# Patient Record
Sex: FEMALE | Race: BLACK OR AFRICAN AMERICAN | NOT HISPANIC OR LATINO | Employment: FULL TIME | URBAN - METROPOLITAN AREA
[De-identification: names, ages, dates, MRNs, and addresses within clinical notes are randomized per-mention and may not be internally consistent; named-entity substitution may affect disease eponyms.]

---

## 2017-03-08 ENCOUNTER — OFFICE VISIT (OUTPATIENT)
Dept: FAMILY MEDICINE | Facility: CLINIC | Age: 47
End: 2017-03-08
Payer: COMMERCIAL

## 2017-03-08 VITALS
HEIGHT: 65 IN | WEIGHT: 150.13 LBS | TEMPERATURE: 99 F | OXYGEN SATURATION: 100 % | BODY MASS INDEX: 25.01 KG/M2 | SYSTOLIC BLOOD PRESSURE: 142 MMHG | RESPIRATION RATE: 18 BRPM | HEART RATE: 100 BPM | DIASTOLIC BLOOD PRESSURE: 82 MMHG

## 2017-03-08 DIAGNOSIS — N89.8 VAGINAL DISCHARGE: Primary | ICD-10-CM

## 2017-03-08 PROCEDURE — 1160F RVW MEDS BY RX/DR IN RCRD: CPT | Mod: S$GLB,,, | Performed by: REGISTERED NURSE

## 2017-03-08 PROCEDURE — 87480 CANDIDA DNA DIR PROBE: CPT

## 2017-03-08 PROCEDURE — 99213 OFFICE O/P EST LOW 20 MIN: CPT | Mod: S$GLB,,, | Performed by: REGISTERED NURSE

## 2017-03-08 PROCEDURE — 99999 PR PBB SHADOW E&M-EST. PATIENT-LVL IV: CPT | Mod: PBBFAC,,, | Performed by: REGISTERED NURSE

## 2017-03-08 NOTE — PROGRESS NOTES
"Subjective:       Patient ID: Liana Keane is a 46 y.o. female.    Chief Complaint: Vaginitis    HPI     Liana is here today with c/o vaginal discharge x few days.  Reports clear d/c with mild odor and itching.  Wears panty liners daily, uses Dial antibacterial soap for daily hygiene.  Has not used any OTC meds at this time.    Review of Systems   Constitutional: Negative.    Genitourinary: Positive for vaginal discharge. Negative for dysuria, genital sores, vaginal bleeding and vaginal pain.       Objective:       Vitals:    03/08/17 1330   BP: (!) 142/82   BP Location: Left arm   Patient Position: Sitting   BP Method: Manual   Pulse: 100   Resp: 18   Temp: 99.3 °F (37.4 °C)   TempSrc: Tympanic   SpO2: 100%   Weight: 68.1 kg (150 lb 2.1 oz)   Height: 5' 5" (1.651 m)       Physical Exam   Constitutional: She is oriented to person, place, and time. She appears well-developed and well-nourished.   Genitourinary: There is no rash, tenderness or lesion on the right labia. There is no rash, tenderness or lesion on the left labia. No erythema or tenderness in the vagina. Vaginal discharge (scant amount of whitish-clear d/c, mild odor) found.   Neurological: She is alert and oriented to person, place, and time.       Assessment:       1. Vaginal discharge        Plan:       Liana was seen today for vaginitis.    Diagnoses and all orders for this visit:    Vaginal discharge  -     Vaginosis Screen by DNA Probe      Infection triggers and prevention discussed.  Lab pending.  RTC prn.  "

## 2017-03-08 NOTE — MR AVS SNAPSHOT
"    [facility]  [address]  [place] [address]  Phone: [phone]                  Liana Keane   3/8/2017 1:15 PM   Office Visit    Description:  Female : 1970   Provider:  Carlos Rivera NP   Department:  [facility]           Reason for Visit     Vaginitis           Diagnoses this Visit        Comments    Vaginal discharge    -  Primary            To Do List           Goals (5 Years of Data)     None      [facility]     [facility] Care [facility] -  Assistance  Registered nurses in the [facility] provide clinical advisement, health education, appointment booking, and other advisory services.  Call for this free service at [phone].             Medications           Message regarding Medications     Verify the changes and/or additions to your medication regime listed below are the same as discussed with your clinician today.  If any of these changes or additions are incorrect, please notify your healthcare provider.        STOP taking these medications     fluticasone (FLONASE) 50 mcg/actuation nasal spray 2 sprays by Each Nare route daily as needed.           Verify that the below list of medications is an accurate representation of the medications you are currently taking.  If none reported, the list may be blank. If incorrect, please contact your healthcare provider. Carry this list with you in case of emergency.           Current Medications     albuterol (PROAIR HFA) 90 mcg/actuation inhaler Inhale 2 puffs into the lungs every 6 (six) hours as needed.    drospirenone-ethinyl estradiol (VESTURA, 28,) 3-0.02 mg per tablet Take 1 tablet by mouth once daily.           Clinical Reference Information           Your Vitals Were     BP Pulse Temp Resp Height    142/82 (BP Location: Left arm, Patient Position: Sitting, BP Method: Manual) 100 99.3 °F (37.4 °C) (Tympanic) 18 5' 5" (1.651 m)    Weight Last Period SpO2 " BMI    68.1 kg (150 lb 2.1 oz) 02/18/2017 100% 24.98 kg/m2      Blood Pressure          Most Recent Value    BP  (!)  142/82      Allergies as of 3/8/2017     No Known Allergies      Immunizations Administered on Date of Encounter - 3/8/2017     None      Orders Placed During Today's Visit      Normal Orders This Visit    Vaginosis Screen by DNA Probe       Language Assistance Services     ATTENTION: Language assistance services are available, free of charge. Please call [phone].      ATENCIÓN: Si daniellala daljit, tiene a laboy disposición servicios gratuitos de asistencia lingüística. Llame al [phone].     GABBY Ý: N?u b?n nói Ti?ng Vi?t, có các d?ch v? h? tr? ngôn ng? mi?n phí dành cho b?n. G?i s? [phone].         [facility] complies with applicable Federal civil rights laws and does not discriminate on the basis of race, color, national origin, age, disability, or sex.

## 2017-03-09 LAB
CANDIDA RRNA VAG QL PROBE: NEGATIVE
G VAGINALIS RRNA GENITAL QL PROBE: NEGATIVE
T VAGINALIS RRNA GENITAL QL PROBE: NEGATIVE

## 2017-03-10 ENCOUNTER — TELEPHONE (OUTPATIENT)
Dept: FAMILY MEDICINE | Facility: CLINIC | Age: 47
End: 2017-03-10

## 2017-04-19 ENCOUNTER — OFFICE VISIT (OUTPATIENT)
Dept: FAMILY MEDICINE | Facility: CLINIC | Age: 47
End: 2017-04-19
Payer: COMMERCIAL

## 2017-04-19 VITALS
OXYGEN SATURATION: 100 % | DIASTOLIC BLOOD PRESSURE: 82 MMHG | BODY MASS INDEX: 25.3 KG/M2 | SYSTOLIC BLOOD PRESSURE: 118 MMHG | HEIGHT: 65 IN | HEART RATE: 97 BPM | TEMPERATURE: 99 F | WEIGHT: 151.88 LBS | RESPIRATION RATE: 16 BRPM

## 2017-04-19 DIAGNOSIS — J30.9 ALLERGIC RHINITIS, UNSPECIFIED: Primary | ICD-10-CM

## 2017-04-19 PROCEDURE — 99213 OFFICE O/P EST LOW 20 MIN: CPT | Mod: 25,S$GLB,, | Performed by: REGISTERED NURSE

## 2017-04-19 PROCEDURE — 1160F RVW MEDS BY RX/DR IN RCRD: CPT | Mod: S$GLB,,, | Performed by: REGISTERED NURSE

## 2017-04-19 PROCEDURE — 99999 PR PBB SHADOW E&M-EST. PATIENT-LVL III: CPT | Mod: PBBFAC,,, | Performed by: REGISTERED NURSE

## 2017-04-19 RX ORDER — BETAMETHASONE SODIUM PHOSPHATE AND BETAMETHASONE ACETATE 3; 3 MG/ML; MG/ML
12 INJECTION, SUSPENSION INTRA-ARTICULAR; INTRALESIONAL; INTRAMUSCULAR; SOFT TISSUE
Status: COMPLETED | OUTPATIENT
Start: 2017-04-19 | End: 2017-04-19

## 2017-04-19 RX ADMIN — BETAMETHASONE SODIUM PHOSPHATE AND BETAMETHASONE ACETATE 12 MG: 3; 3 INJECTION, SUSPENSION INTRA-ARTICULAR; INTRALESIONAL; INTRAMUSCULAR; SOFT TISSUE at 02:04

## 2017-04-19 NOTE — PROGRESS NOTES
Subjective:       Patient ID: Liana Keane is a 46 y.o. female.    Chief Complaint: Sinus Problem      HPI    Liana is here today with c/o illness x 5 days.  Reports not worsening, just lingering on w/out resolution.  Reports RN, NC, sneezing, sore throat, aches and watery eyes.  Taking Zyrtec, [place] and using Flonase spray.  History of seasonal allergies.    Review of Systems   Constitutional: Negative for chills and fever.   HENT: Positive for congestion, postnasal drip, rhinorrhea, sneezing and sore throat. Negative for ear pain and sinus pressure.    Eyes: Positive for discharge and itching. Negative for photophobia, pain, redness and visual disturbance.   Respiratory: Negative.    Cardiovascular: Negative.    Allergic/Immunologic: Positive for environmental allergies.   Neurological: Negative.    Hematological: Negative for adenopathy.         Patient Active Problem List   Diagnosis    Seasonal allergic rhinitis due to pollen    Mild intermittent asthma without complication    Anemia         Objective:     Physical Exam   Constitutional: She is oriented to person, place, and time. She appears well-developed and well-nourished.   HENT:   Head: Normocephalic and atraumatic.   Right Ear: Tympanic membrane, external ear and ear canal normal.   Left Ear: Tympanic membrane, external ear and ear canal normal.   Nose: Mucosal edema and rhinorrhea (boggy, clear RN) present. Right sinus exhibits no maxillary sinus tenderness and no frontal sinus tenderness. Left sinus exhibits no maxillary sinus tenderness and no frontal sinus tenderness.   Mouth/Throat: No oropharyngeal exudate, posterior oropharyngeal edema or posterior oropharyngeal erythema (clear PND noted).   Eyes: Pupils are equal, round, and reactive to light. Right eye exhibits discharge (clear B watery d/c, no eye redness noted). Left eye exhibits discharge.   Cardiovascular: Normal rate, regular rhythm and normal heart sounds.    Pulmonary/Chest: Effort  normal and breath sounds normal.   Lymphadenopathy:     She has no cervical adenopathy.   Neurological: She is alert and oriented to person, place, and time.   Vitals reviewed.        Medication List with Changes/Refills   Current Medications    ALBUTEROL (PROAIR HFA) 90 MCG/ACTUATION INHALER    Inhale 2 puffs into the lungs every 6 (six) hours as needed.    DROSPIRENONE-ETHINYL ESTRADIOL (VESTURA, 28,) 3-0.02 MG PER TABLET    Take 1 tablet by mouth once daily.           Diagnosis       1. Allergic rhinitis, unspecified          Assessment/ Plan     Allergic rhinitis, unspecified  -     betamethasone acetate-betamethasone sodium phosphate injection 12 mg; Inject 2 mLs (12 mg total) into the muscle one time.      Allegra or Claritin daily for allergies.  Continue Flonase.  Symptomatic care, rest, fluids, hydration.  Follow-up in clinic as needed.      PARAMJIT Foley  [facility]

## 2017-04-19 NOTE — MR AVS SNAPSHOT
[facility]  [address]  [place] [address]  Phone: [phone]                  Liana Keane   2017 1:45 PM   Office Visit    Description:  Female : 1970   Provider:  Carlos Rivera NP   Department:  [facility]           Reason for Visit     Sinus Problem           Diagnoses this Visit        Comments    Allergic rhinitis, unspecified    -  Primary            To Do List           Goals (5 Years of Data)     None      [facility]     [facility] Nurse Care Line -  Assistance  Unless otherwise directed by your provider, please contact [facility], our nurse care line that is available for  assistance.     Registered nurses in the [facility] provide: appointment scheduling, clinical advisement, health education, and other advisory services.  Call: [phone] (toll free)               Medications           Message regarding Medications     Verify the changes and/or additions to your medication regime listed below are the same as discussed with your clinician today.  If any of these changes or additions are incorrect, please notify your healthcare provider.        These medications were administered today        Dose Freq    betamethasone acetate-betamethasone sodium phosphate injection 12 mg 12 mg Clinic/HOD 1 time    Sig: Inject 2 mLs (12 mg total) into the muscle one time.    Class: Normal    Route: Intramuscular           Verify that the below list of medications is an accurate representation of the medications you are currently taking.  If none reported, the list may be blank. If incorrect, please contact your healthcare provider. Carry this list with you in case of emergency.           Current Medications     albuterol (PROAIR HFA) 90 mcg/actuation inhaler Inhale 2 puffs into the lungs every 6 (six) hours as needed.    drospirenone-ethinyl estradiol (VESTURA, 28,) 3-0.02 mg per tablet Take 1 tablet by  "mouth once daily.           Clinical Reference Information           Your Vitals Were     BP Pulse Temp Resp Height    118/82 (BP Location: Left arm, Patient Position: Sitting, BP Method: Manual) 97 99.2 °F (37.3 °C) (Tympanic) 16 5' 5" (1.651 m)    Weight Last Period SpO2 BMI    68.9 kg (151 lb 14.4 oz) 04/15/2017 100% 25.28 kg/m2      Blood Pressure          Most Recent Value    BP  118/82      Allergies as of 4/19/2017     No Known Allergies      Immunizations Administered on Date of Encounter - 4/19/2017     None      Language Assistance Services     ATTENTION: Language assistance services are available, free of charge. Please call [phone].      ATENCIÓN: Si habla daljit, tiene a laboy disposición servicios gratuitos de asistencia lingüística. Llame al [phone].     GABBY Ý: N?u b?n nói Ti?ng Vi?t, có các d?ch v? h? tr? ngôn ng? mi?n phí dành cho b?n. G?i s? [phone].         [facility] complies with applicable Federal civil rights laws and does not discriminate on the basis of race, color, national origin, age, disability, or sex.        "

## 2017-09-24 RX ORDER — DROSPIRENONE AND ETHINYL ESTRADIOL 0.02-3(28)
KIT ORAL
Qty: 28 TABLET | Refills: 0 | Status: SHIPPED | OUTPATIENT
Start: 2017-09-24 | End: 2017-10-24 | Stop reason: SDUPTHER

## 2017-10-24 ENCOUNTER — OFFICE VISIT (OUTPATIENT)
Dept: FAMILY MEDICINE | Facility: CLINIC | Age: 47
End: 2017-10-24
Payer: COMMERCIAL

## 2017-10-24 VITALS
WEIGHT: 158.75 LBS | SYSTOLIC BLOOD PRESSURE: 120 MMHG | RESPIRATION RATE: 18 BRPM | DIASTOLIC BLOOD PRESSURE: 82 MMHG | HEIGHT: 65 IN | HEART RATE: 97 BPM | OXYGEN SATURATION: 100 % | TEMPERATURE: 99 F | BODY MASS INDEX: 26.45 KG/M2

## 2017-10-24 DIAGNOSIS — N92.0 MENORRHAGIA WITH REGULAR CYCLE: ICD-10-CM

## 2017-10-24 DIAGNOSIS — N94.6 DYSMENORRHEA: ICD-10-CM

## 2017-10-24 DIAGNOSIS — Z00.00 PREVENTATIVE HEALTH CARE: Primary | ICD-10-CM

## 2017-10-24 DIAGNOSIS — H93.8X1 POUNDING NOISE IN RIGHT EAR: ICD-10-CM

## 2017-10-24 DIAGNOSIS — D64.9 ANEMIA, UNSPECIFIED TYPE: ICD-10-CM

## 2017-10-24 PROCEDURE — 99999 PR PBB SHADOW E&M-EST. PATIENT-LVL IV: CPT | Mod: PBBFAC,,, | Performed by: FAMILY MEDICINE

## 2017-10-24 PROCEDURE — 99396 PREV VISIT EST AGE 40-64: CPT | Mod: S$GLB,,, | Performed by: FAMILY MEDICINE

## 2017-10-24 RX ORDER — DROSPIRENONE AND ETHINYL ESTRADIOL 0.02-3(28)
1 KIT ORAL DAILY
Qty: 28 TABLET | Refills: 11 | Status: ON HOLD | OUTPATIENT
Start: 2017-10-24 | End: 2017-12-27 | Stop reason: HOSPADM

## 2017-10-24 NOTE — PROGRESS NOTES
CHIEF COMPLAINT: This is a 47-year-old female here for preventive health exam.    SUBJECTIVE: Patient complains of a nine-month history of severe menstrual cramping and heavy flow despite taking oral contraceptive pill.  She has 7 days of flow, 5 of which are heavy.  Patient complains of rhythmic pounding in right ear for 2 weeks, which is painful.  She reports that hearing comes and goes but had normal audiogram a few years ago.  Patient uses rescue inhaler for mild intermittent asthma    Eye exam August 2017. Pap smear October 2015, due again in October 2018. Mammogram November 2016.  Tdap October 2015.     ROS:  GENERAL: Patient denies fever, chills, night sweats. Patient denies weight loss. Patient denies anorexia, fatigue, weakness or swollen glands.  SKIN: Patient denies rash or hair loss.  HEENT: Patient denies sore throat, ear pain, hearing loss, nasal congestion, or runny nose. Patient denies visual disturbance, eye irritation or discharge.  LUNGS: Patient denies cough, wheeze or hemoptysis.  CARDIOVASCULAR: Patient denies chest pain, shortness of breath, palpitations, syncope or lower extremity edema.  GI: Patient denies abdominal pain, nausea, vomiting, diarrhea, constipation, blood in stool or melena.  GENITOURINARY: Patient denies vaginal discharge, itch or odor.  Patient denies dysuria, frequency, hematuria, nocturia, urgency or incontinence.  BREASTS: Patient denies breast pain, mass or nipple discharge.  MUSCULOSKELETAL: Patient denies joint pain, swelling, redness or warmth.  NEUROLOGIC: Patient denies headache, vertigo, paresthesias, weakness in limb, dysarthria, dysphagia or abnormality of gait.  PSYCHIATRIC: Patient denies anxiety, depression, or memory loss.     OBJECTIVE:   GENERAL: Well-developed well-nourished, slightly overweight black female alert and oriented x3, in no acute distress. Memory, judgment and cognition without deficit. Weight gain of 11 pounds in the last year.  SKIN: Clear  without rash. Normal color and tone.  HEENT: Eyes: Clear conjunctivae. Pupils equal reactive to light and accommodation. Ears: Clear TMs. Clear canals. Nose: Without congestion. Pharynx: Without injection or exudates.  NECK: Supple, normal range of motion. No masses, lymphadenopathy or enlarged thyroid. No JVD. Carotids 2+ and equal. No bruits.  LUNGS: Clear to auscultation. Normal respiratory effort.  CARDIOVASCULAR: Regular rhythm, normal S1, S2 without murmur, gallop or rub.  BACK: No CVA or spinal tenderness.  BREASTS: No masses, tenderness or nipple discharge.  ABDOMEN: Normal appearance. Active bowel sounds. Soft, nontender without mass or organomegaly. No rebound or guarding.  EXTREMITIES: Without cyanosis, clubbing or edema. Distal pulses 2+ and equal. Normal range of motion in all extremities. No joint effusion, erythema or warmth.  NEUROLOGIC: Cranial nerves II through XII without deficit. Motor strength equal bilaterally. Sensation normal to touch. Deep tendon reflexes 2+ and equal. Gait without abnormality. No tremor. Negative cerebellar signs.  PELVIC: Deferred to GYN.    ASSESSMENT:  1. Preventative health care    2. Anemia, unspecified type    3. Dysmenorrhea    4. Menorrhagia with regular cycle    5. Pounding noise in right ear      PLAN:   1.  Weight reduction.  Exercise regularly.  2.  Age-appropriate counseling.  3.  Fasting lab.  4.  GYN consult.  5.  ENT consult.  6.  Refill OCP.

## 2017-10-25 ENCOUNTER — TELEPHONE (OUTPATIENT)
Dept: FAMILY MEDICINE | Facility: CLINIC | Age: 47
End: 2017-10-25

## 2017-10-25 DIAGNOSIS — Z12.31 ENCOUNTER FOR SCREENING MAMMOGRAM FOR MALIGNANT NEOPLASM OF BREAST: Primary | ICD-10-CM

## 2017-10-25 NOTE — TELEPHONE ENCOUNTER
----- Message from Denise Horvath sent at 10/25/2017  9:53 AM CDT -----  Contact: pt  Please call pt @ [phone] regarding an order for a mammograph, pt was in clinic yesterday.

## 2017-10-27 ENCOUNTER — OFFICE VISIT (OUTPATIENT)
Dept: OTOLARYNGOLOGY | Facility: CLINIC | Age: 47
End: 2017-10-27
Payer: COMMERCIAL

## 2017-10-27 ENCOUNTER — CLINICAL SUPPORT (OUTPATIENT)
Dept: AUDIOLOGY | Facility: CLINIC | Age: 47
End: 2017-10-27
Payer: COMMERCIAL

## 2017-10-27 ENCOUNTER — LAB VISIT (OUTPATIENT)
Dept: LAB | Facility: HOSPITAL | Age: 47
End: 2017-10-27
Attending: FAMILY MEDICINE
Payer: COMMERCIAL

## 2017-10-27 ENCOUNTER — OFFICE VISIT (OUTPATIENT)
Dept: OBSTETRICS AND GYNECOLOGY | Facility: CLINIC | Age: 47
End: 2017-10-27
Payer: COMMERCIAL

## 2017-10-27 VITALS — BODY MASS INDEX: 25.96 KG/M2 | WEIGHT: 156 LBS

## 2017-10-27 VITALS
HEIGHT: 65 IN | SYSTOLIC BLOOD PRESSURE: 108 MMHG | WEIGHT: 156.06 LBS | DIASTOLIC BLOOD PRESSURE: 70 MMHG | BODY MASS INDEX: 26 KG/M2

## 2017-10-27 DIAGNOSIS — N92.0 MENORRHAGIA WITH REGULAR CYCLE: ICD-10-CM

## 2017-10-27 DIAGNOSIS — N94.6 DYSMENORRHEA: ICD-10-CM

## 2017-10-27 DIAGNOSIS — Z00.00 PREVENTATIVE HEALTH CARE: ICD-10-CM

## 2017-10-27 DIAGNOSIS — Z01.419 ENCOUNTER FOR GYNECOLOGICAL EXAMINATION WITHOUT ABNORMAL FINDING: Primary | ICD-10-CM

## 2017-10-27 DIAGNOSIS — H93.11 TINNITUS OF RIGHT EAR: Primary | ICD-10-CM

## 2017-10-27 DIAGNOSIS — H93.11 SUBJECTIVE TINNITUS OF RIGHT EAR: Primary | ICD-10-CM

## 2017-10-27 LAB
ALBUMIN SERPL BCP-MCNC: 3.2 G/DL
ALP SERPL-CCNC: 33 U/L
ALT SERPL W/O P-5'-P-CCNC: 11 U/L
ANION GAP SERPL CALC-SCNC: 10 MMOL/L
AST SERPL-CCNC: 20 U/L
BASOPHILS # BLD AUTO: 0.03 K/UL
BASOPHILS NFR BLD: 1 %
BILIRUB SERPL-MCNC: 0.3 MG/DL
BUN SERPL-MCNC: 8 MG/DL
CALCIUM SERPL-MCNC: 9.1 MG/DL
CHLORIDE SERPL-SCNC: 107 MMOL/L
CHOLEST SERPL-MCNC: 165 MG/DL
CHOLEST/HDLC SERPL: 2 {RATIO}
CO2 SERPL-SCNC: 21 MMOL/L
CREAT SERPL-MCNC: 1 MG/DL
DIFFERENTIAL METHOD: ABNORMAL
EOSINOPHIL # BLD AUTO: 0.1 K/UL
EOSINOPHIL NFR BLD: 2.9 %
ERYTHROCYTE [DISTWIDTH] IN BLOOD BY AUTOMATED COUNT: 12.4 %
EST. GFR  (AFRICAN AMERICAN): >60 ML/MIN/1.73 M^2
EST. GFR  (NON AFRICAN AMERICAN): >60 ML/MIN/1.73 M^2
GLUCOSE SERPL-MCNC: 94 MG/DL
HCT VFR BLD AUTO: 35.7 %
HDLC SERPL-MCNC: 81 MG/DL
HDLC SERPL: 49.1 %
HGB BLD-MCNC: 11.8 G/DL
IMM GRANULOCYTES # BLD AUTO: 0 K/UL
IMM GRANULOCYTES NFR BLD AUTO: 0 %
LDLC SERPL CALC-MCNC: 68.6 MG/DL
LYMPHOCYTES # BLD AUTO: 1.6 K/UL
LYMPHOCYTES NFR BLD: 51.4 %
MCH RBC QN AUTO: 30.4 PG
MCHC RBC AUTO-ENTMCNC: 33.1 G/DL
MCV RBC AUTO: 92 FL
MONOCYTES # BLD AUTO: 0.3 K/UL
MONOCYTES NFR BLD: 8.7 %
NEUTROPHILS # BLD AUTO: 1.1 K/UL
NEUTROPHILS NFR BLD: 36 %
NONHDLC SERPL-MCNC: 84 MG/DL
NRBC BLD-RTO: 0 /100 WBC
PLATELET # BLD AUTO: 287 K/UL
PMV BLD AUTO: 10.9 FL
POTASSIUM SERPL-SCNC: 4.2 MMOL/L
PROT SERPL-MCNC: 7.2 G/DL
RBC # BLD AUTO: 3.88 M/UL
SODIUM SERPL-SCNC: 138 MMOL/L
TRIGL SERPL-MCNC: 77 MG/DL
TSH SERPL DL<=0.005 MIU/L-ACNC: 0.99 UIU/ML
WBC # BLD AUTO: 3.11 K/UL

## 2017-10-27 PROCEDURE — 92567 TYMPANOMETRY: CPT | Mod: S$GLB,,, | Performed by: AUDIOLOGIST

## 2017-10-27 PROCEDURE — 80061 LIPID PANEL: CPT

## 2017-10-27 PROCEDURE — 36415 COLL VENOUS BLD VENIPUNCTURE: CPT | Mod: PO

## 2017-10-27 PROCEDURE — 80053 COMPREHEN METABOLIC PANEL: CPT

## 2017-10-27 PROCEDURE — 99386 PREV VISIT NEW AGE 40-64: CPT | Mod: 25,S$GLB,, | Performed by: NURSE PRACTITIONER

## 2017-10-27 PROCEDURE — 99999 PR PBB SHADOW E&M-EST. PATIENT-LVL II: CPT | Mod: PBBFAC,,, | Performed by: OTOLARYNGOLOGY

## 2017-10-27 PROCEDURE — 92557 COMPREHENSIVE HEARING TEST: CPT | Mod: S$GLB,,, | Performed by: AUDIOLOGIST

## 2017-10-27 PROCEDURE — 84443 ASSAY THYROID STIM HORMONE: CPT

## 2017-10-27 PROCEDURE — 99999 PR PBB SHADOW E&M-EST. PATIENT-LVL III: CPT | Mod: PBBFAC,,, | Performed by: NURSE PRACTITIONER

## 2017-10-27 PROCEDURE — 99214 OFFICE O/P EST MOD 30 MIN: CPT | Mod: S$GLB,,, | Performed by: OTOLARYNGOLOGY

## 2017-10-27 PROCEDURE — 85025 COMPLETE CBC W/AUTO DIFF WBC: CPT

## 2017-10-27 NOTE — LETTER
October 27, 2017      Rhoda Charles MD  [address] [place] [address]           [facility] - [facility]NT  [address] [place] [address]  Phone: [phone]  Fax: [phone]          Patient: Liana Keane   MR Number: [id number]   YOB: 1970   Date of Visit: 10/27/2017       Dear Dr. Rhoda Charles:    Thank you for referring Liana Keane to me for evaluation. Attached you will find relevant portions of my assessment and plan of care.    If you have questions, please do not hesitate to call me. I look forward to following Liana Keane along with you.    Sincerely,    Rush Morales MD    Enclosure  CC:  No Recipients    If you would like to receive this communication electronically, please contact [email or web address] or [phone] to request more information on [employer] Link access.    For providers and/or their staff who would like to refer a patient to [facility], please contact us through our one-stop-shop provider referral line, [facility] , at [phone].    If you feel you have received this communication in error or would no longer like to receive these types of communications, please e-mail [email or web address]

## 2017-10-27 NOTE — PROGRESS NOTES
Subjective:   Patient: Liana Keane [id number], :1970   Visit date:10/27/2017 11:57 AM    Chief Complaint:  Tinnitus (Right ear review audio today ) and Otalgia (Right ear )    HPI:  Liana is a 47 y.o. female who is here for     Pulsatile/pounding tinnitus Right ear  2 weeks intermittent  Total of 4-5 episodes  Last episode about 6 days ago  They last until she goes to bed  No change in hearing  Associated with pressure/pain which also comes and goes  Has had increased stress over the past 2 weeks.    Audiogram reviewed- normal hearing, Type A tymps      Review of Systems:  -     Allergic/Immunologic: has No Known Allergies..  -     Constitutional: Current temp:      Her meds, allergies, medical, surgical, social & family histories were reviewed & updated:  -     She has a current medication list which includes the following prescription(s): albuterol and drospirenone-ethinyl estradiol.  -     She  has a past medical history of Acne; Allergic rhinitis; Anemia; Asthma; Herpes simplex type II infection; and Multinodular goiter.   -     She  does not have any pertinent problems on file.   -     She  has a past surgical history that includes Hernia repair; FNA thyroid nodule (2014); and Thyroidectomy, partial (2015).  -     She  reports that she has never smoked. She has never used smokeless tobacco. She reports that she drinks alcohol. She reports that she does not use drugs.  -     Her family history includes Allergic rhinitis in her sister; Asthma in her sister; Cancer in her other; Diabetes in her maternal grandfather; Hypertension in her maternal grandmother and mother; Thyroid disease in her mother.  -     She has No Known Allergies.    Objective:     Physical Exam:  Vitals:  Wt 70.8 kg (156 lb)   LMP 10/26/2017 (Exact Date)   BMI 25.96 kg/m²   Appearance:  Well-developed, well-nourished.  Communication:  Able to communicate, no hoarseness.  Head & Face:  Normocephalic, atraumatic, no sinus  tenderness, normal facial strength.  Eyes:  Extraocular motions intact.  Ears:  Otoscopy of external auditory canals and tympanic membranes was normal, clinical speech reception thresholds grossly intact, no mass/lesion of auricle.  Nose:  No masses/lesions of external nose, nasal mucosa, septum, and turbinates were within normal limits.  Mouth:  No mass/lesion of lips, teeth, gums, hard/soft palate, tongue, tonsils, or oropharynx.  Neck & Lymphatics:  No cervical lymphadenopathy, no neck mass/crepitus/ asymmetry, trachea is midline, no thyroid enlargement/tenderness/mass.  Neuro/Psych: Alert with normal mood and affect.   Abdominal: Normal appearance.   Respiration/Chest:  Symmetric expansion during respiration, normal respiratory effort.  Skin:  Warm and intact  Cardiovascular:  No peripheral vascular edema or varicosities.    Assessment & Plan:   Recommend stress reduction.  We discussed that if this becomes more frequent, CTA would be in order but given intermittent nature, vascular abnormality is unlikely.  Discussed need to check BP during sx if they recur as well.  She will contact me if symptoms persist/recur.

## 2017-10-27 NOTE — PROGRESS NOTES
Liana Keane was seen 10/27/2017 for an audiological evaluation.  Patient complains of a thumping sound with shooting pain in her right ear only.  Onset was 2 weeks ago. Last episode was last Sunday and it lasted for several hours. She reports that her hearing seems to decrease mildly on the right side while she has the sound and pain. She denies any dizziness.     Results reveal normal hearing sensitivity 250-8000 Hz bilaterally.  Speech Reception Thresholds were  10 dBHL for the right ear and 10 dBHL for the left ear.   Word recognition scores were excellent bilaterally.  Tympanograms were Type A, normal for the right ear and Type A, normal for the left ear.    Patient was counseled on the above findings.    Recommendations include:    1.  ENT followup  2.  Wear hearing protective devices around loud noise

## 2017-10-27 NOTE — LETTER
October 27, 2017      Rhoda Charles MD  [address]  [place] [address]           [facility] - OB/ GYN  [address]  [place] [address]  Phone: [phone]  Fax: [phone]          Patient: Liana Keane   MR Number: [id number]   YOB: 1970   Date of Visit: 10/27/2017       Dear Dr. Rhoda Charles:    Thank you for referring Liana Keane to me for evaluation. Attached you will find relevant portions of my assessment and plan of care.    If you have questions, please do not hesitate to call me. I look forward to following Liana Keane along with you.    Sincerely,    Gifty Alegria, BERNARD    Enclosure  CC:  No Recipients    If you would like to receive this communication electronically, please contact [email or web address] or [phone] to request more information on [employer] Link access.    For providers and/or their staff who would like to refer a patient to [facility], please contact us through our one-stop-shop provider referral line, [facility] , at [phone].    If you feel you have received this communication in error or would no longer like to receive these types of communications, please e-mail [email or web address]

## 2017-10-27 NOTE — PROGRESS NOTES
"CC: Well woman exam    Liana Keane is a 47 y.o. female  presents for well woman exam and issues.  States earlier this year started with severe pelvic pain first 1-2 days of cycle - so bad is missing work.  Also noted it has gotten very heavy with large clotting.  Has been on kevin for years and started on it due to irregular menses and pain - has worked up until this year.  Had some bleeding in  that regulated out with having thyroid surgery.  Her labs are on order now from her pcp.   Pap is up to date in  with Dr. Charles.    LMP: Patient's last menstrual period was 10/26/2017 (exact date)..     Past Medical History:   Diagnosis Date    Acne     Allergic rhinitis     Anemia     Asthma     Herpes simplex type II infection     Multinodular goiter      Past Surgical History:   Procedure Laterality Date    FNA thyroid nodule  2014    HERNIA REPAIR      THYROIDECTOMY, PARTIAL  2015     Social History     Social History    Marital status:      Spouse name: N/A    Number of children: N/A    Years of education: N/A     Occupational History    [employer]     Social History Main Topics    Smoking status: Never Smoker    Smokeless tobacco: Never Used    Alcohol use Yes      Comment: Occasionally    Drug use: No    Sexual activity: Not Currently     Birth control/ protection: OCP     Other Topics Concern    Not on file     Social History Narrative    Patient is  and has 2 children. She works as a .     Family History   Problem Relation Age of Onset    Hypertension Mother     Thyroid disease Mother     Hypertension Maternal Grandmother     Cancer Other      Maternal great-grandmother    Diabetes Maternal Grandfather     Asthma Sister     Allergic rhinitis Sister      OB History      Para Term  AB Living    2 2       2    SAB TAB Ectopic Multiple Live Births                       /70   Ht 5' 5" (1.651 m)   Wt 70.8 kg " (156 lb 1.4 oz)   LMP 10/26/2017 (Exact Date)   BMI 25.97 kg/m²       ROS:  GENERAL: Denies weight gain or weight loss. Feeling well overall.   SKIN: Denies rash or lesions.   HEAD: Denies head injury or headache.   NODES: Denies enlarged lymph nodes.   CHEST: Denies chest pain or shortness of breath.   CARDIOVASCULAR: Denies palpitations or left sided chest pain.   ABDOMEN: No abdominal pain, constipation, diarrhea, nausea, vomiting or rectal bleeding.   URINARY: No frequency, dysuria, hematuria, or burning on urination.  REPRODUCTIVE: See HPI.   BREASTS: The patient performs breast self-examination and denies pain, lumps, or nipple discharge.   HEMATOLOGIC: No easy bruisability or excessive bleeding.   MUSCULOSKELETAL: Denies joint pain or swelling.   NEUROLOGIC: Denies syncope or weakness.   PSYCHIATRIC: Denies depression, anxiety or mood swings.    PHYSICAL EXAM:  APPEARANCE: Well nourished, well developed, in no acute distress.  AFFECT: WNL, alert and oriented x 3  SKIN: No acne or hirsutism  NECK: Neck symmetric without masses or thyromegaly  NODES: No inguinal, cervical, axillary, or femoral lymph node enlargement  CHEST: Good respiratory effect  ABDOMEN: Soft.  No tenderness or masses.  No hepatosplenomegaly.  No hernias.  BREASTS: Symmetrical, no skin changes or visible lesions.  No palpable masses, nipple discharge bilaterally.  PELVIC: Normal external genitalia without lesions.  Normal hair distribution.  Adequate perineal body, normal urethral meatus.  Vagina moist and well rugated without lesions or discharge - thick dark bloody menses.  Cervix pink, without lesions, discharge or tenderness.  No significant cystocele or rectocele.  Bimanual exam shows uterus to be normal size, regular, mobile and nontender - normal exam.  Adnexa without masses or tenderness.    EXTREMITIES: No edema.  Physical Exam    1. Encounter for gynecological examination without abnormal finding     2. Dysmenorrhea  US Pelvis  Comp with Transvag NON-OB (xpd   3. Menorrhagia with regular cycle  US Pelvis Comp with Transvag NON-OB (xpd    AND PLAN:    Patient was counseled today on A.C.S. Pap guidelines and recommendations for yearly pelvic exams, mammograms and monthly self breast exams; to see her PCP for other health maintenance.       Continue current meds, pelvic us and see Gyn MD for further consult after u/s

## 2017-10-31 ENCOUNTER — TELEPHONE (OUTPATIENT)
Dept: RADIOLOGY | Facility: HOSPITAL | Age: 47
End: 2017-10-31

## 2017-11-01 ENCOUNTER — HOSPITAL ENCOUNTER (OUTPATIENT)
Dept: RADIOLOGY | Facility: HOSPITAL | Age: 47
Discharge: HOME | End: 2017-11-01
Attending: NURSE PRACTITIONER
Payer: COMMERCIAL

## 2017-11-01 DIAGNOSIS — N92.0 MENORRHAGIA WITH REGULAR CYCLE: ICD-10-CM

## 2017-11-01 DIAGNOSIS — N94.6 DYSMENORRHEA: ICD-10-CM

## 2017-11-01 PROCEDURE — 76856 US EXAM PELVIC COMPLETE: CPT | Mod: 26,,, | Performed by: RADIOLOGY

## 2017-11-01 PROCEDURE — 76830 TRANSVAGINAL US NON-OB: CPT | Mod: 26,,, | Performed by: RADIOLOGY

## 2017-11-01 PROCEDURE — 76856 US EXAM PELVIC COMPLETE: CPT | Mod: TC,PO

## 2017-11-02 ENCOUNTER — TELEPHONE (OUTPATIENT)
Dept: OBSTETRICS AND GYNECOLOGY | Facility: CLINIC | Age: 47
End: 2017-11-02

## 2017-11-02 NOTE — TELEPHONE ENCOUNTER
Pt notified that pelvic sono shows fibroids and thick lining and to keep f/u apt with stiles on 11.9.17 for 9:15AM/CHAS

## 2017-11-02 NOTE — TELEPHONE ENCOUNTER
Us shows fibroids and very thickened lining of uterus keep her f/u with Dr. Francisco as scheduled

## 2017-11-02 NOTE — TELEPHONE ENCOUNTER
----- Message from Elizabeth Lawrence sent at 11/2/2017  1:37 PM CDT -----  Contact: pt  She's returning a missed call, please advise [phone](work) [phone](cell)

## 2017-11-09 ENCOUNTER — OFFICE VISIT (OUTPATIENT)
Dept: OBSTETRICS AND GYNECOLOGY | Facility: CLINIC | Age: 47
End: 2017-11-09
Payer: COMMERCIAL

## 2017-11-09 ENCOUNTER — HOSPITAL ENCOUNTER (OUTPATIENT)
Dept: RADIOLOGY | Facility: HOSPITAL | Age: 47
Discharge: HOME | End: 2017-11-09
Attending: FAMILY MEDICINE
Payer: COMMERCIAL

## 2017-11-09 VITALS
HEIGHT: 65 IN | BODY MASS INDEX: 26.23 KG/M2 | WEIGHT: 157.44 LBS | SYSTOLIC BLOOD PRESSURE: 128 MMHG | DIASTOLIC BLOOD PRESSURE: 72 MMHG

## 2017-11-09 DIAGNOSIS — Z12.31 ENCOUNTER FOR SCREENING MAMMOGRAM FOR MALIGNANT NEOPLASM OF BREAST: ICD-10-CM

## 2017-11-09 DIAGNOSIS — N92.0 MENORRHAGIA WITH REGULAR CYCLE: Primary | ICD-10-CM

## 2017-11-09 PROCEDURE — 77067 SCR MAMMO BI INCL CAD: CPT | Mod: TC

## 2017-11-09 PROCEDURE — 99999 PR PBB SHADOW E&M-EST. PATIENT-LVL III: CPT | Mod: PBBFAC,,, | Performed by: OBSTETRICS & GYNECOLOGY

## 2017-11-09 PROCEDURE — 99213 OFFICE O/P EST LOW 20 MIN: CPT | Mod: S$GLB,,, | Performed by: OBSTETRICS & GYNECOLOGY

## 2017-11-09 PROCEDURE — 77067 SCR MAMMO BI INCL CAD: CPT | Mod: 26,,, | Performed by: RADIOLOGY

## 2017-11-09 PROCEDURE — 77063 BREAST TOMOSYNTHESIS BI: CPT | Mod: 26,,, | Performed by: RADIOLOGY

## 2017-11-09 NOTE — PROGRESS NOTES
Subjective:       Patient ID: Liana Keane is a 47 y.o. female.    Chief Complaint:  Follow-up      History of Present Illness  HPI  Dysfunctional Uterine Bleeding  Patient complains of irregular menses. She had been bleeding regularly. She is now bleeding every 28 days and menses are lasting 7 days. Bleeding has become heavy despite using OCP.   Dysmenorrhea:severe, occurring throughout menses and have become very disruptive (pt has to regularly miss work with each menses). Cyclic symptoms include: none. Current contraception: OCP (estrogen/progesterone). History of infertility: none. History of abnormal Pap smear: no.  Last pap NILM in .          GYN & OB HistoryPatient's last menstrual period was 10/26/2017 (exact date).   Date of Last Pap: 2015    OB History    Para Term  AB Living   2 2       2   SAB TAB Ectopic Multiple Live Births                  # Outcome Date GA Lbr Shahram/2nd Weight Sex Delivery Anes PTL Lv   2 Para      Vag-Spont      1 Para      Vag-Spont             Review of Systems  Review of Systems   Constitutional: Negative for activity change, appetite change, fatigue, fever and unexpected weight change.   Respiratory: Negative for shortness of breath.    Cardiovascular: Negative for chest pain, palpitations and leg swelling.   Gastrointestinal: Negative for abdominal pain, constipation, diarrhea, nausea and vomiting.   Genitourinary: Positive for menorrhagia, menstrual problem and dysmenorrhea. Negative for dyspareunia, dysuria, flank pain, frequency, genital sores, hematuria, pelvic pain, vaginal bleeding, vaginal discharge, vaginal pain and vaginal odor.   Musculoskeletal: Negative for back pain.   Neurological: Negative for syncope and headaches.           Objective:    Physical Exam:   Constitutional: She is oriented to person, place, and time. She appears well-developed and well-nourished. No distress.                           Neurological: She is alert and oriented to  person, place, and time.     Psychiatric: She has a normal mood and affect. Her behavior is normal. Thought content normal.          US pelvis: The uterus is normal in size and position measuring 9.7 x 4.0 x 5.9 cm. There are two fibroids identified in the posterior fundus measuring 2.0 cm and 1.1 cm respectively.  Additional 2.6 cm subendometrial fibroid in the anterior fundus versus possible endometrial polyp.  There is heterogeneous thickening of the endometrial stripe measuring 1.6 centers.  Small amount of endometrial fluid also noted.  Incidental nabothian cyst.  The right ovary is unremarkable measuring 1.6 x 1.4 x 1.3 cm.  Left ovary is not visualized.  Small amount of fluid present in the posterior cul-de-sac.     Assessment:        1. Menorrhagia with regular cycle             Plan:      Menorrhagia with regular cycle  -     -     Pt was counseled on fibroids, including common signs and symptoms.  Symptoms are highly disruptive and have led to anemia.  OCP used to work, but symptoms have worsened despite OCP.  Pt is done with her fertility and has no desire for future childbearing (pt is  and is not in a relationship).  Treatment options were reviewed with pt, including medical vs ablation vs hysterectomy.  Pt voiced understanding and desires to proceed with endometrial ablation.  Pt is aware that birth control is still recommended after ablation due to risk of pregnancy and concomitant sterilization procedure was offered.  Pt is also aware that dysmenorrhea symptoms may continue after ablation.  Pt voiced understanding and desires to proceed with ablation alone.  Surgery details, indications, risks, and benefits were reviewed with pt.  Will have Luana contact pt to schedule HSC with Novasure Ablation.  Pt will need EMB prior to surgery.      Return in about 1 week (around 11/16/2017) for EMB.

## 2017-11-10 ENCOUNTER — TELEPHONE (OUTPATIENT)
Dept: OBSTETRICS AND GYNECOLOGY | Facility: CLINIC | Age: 47
End: 2017-11-10

## 2017-11-10 DIAGNOSIS — N92.0 MENORRHAGIA WITH REGULAR CYCLE: Primary | ICD-10-CM

## 2017-11-13 ENCOUNTER — PROCEDURE VISIT (OUTPATIENT)
Dept: OBSTETRICS AND GYNECOLOGY | Facility: CLINIC | Age: 47
End: 2017-11-13
Payer: COMMERCIAL

## 2017-11-13 VITALS
WEIGHT: 157.88 LBS | SYSTOLIC BLOOD PRESSURE: 142 MMHG | BODY MASS INDEX: 26.3 KG/M2 | RESPIRATION RATE: 16 BRPM | HEIGHT: 65 IN | DIASTOLIC BLOOD PRESSURE: 78 MMHG

## 2017-11-13 DIAGNOSIS — N92.0 MENORRHAGIA WITH REGULAR CYCLE: Primary | ICD-10-CM

## 2017-11-13 PROCEDURE — 58100 BIOPSY OF UTERUS LINING: CPT | Mod: S$GLB,,, | Performed by: OBSTETRICS & GYNECOLOGY

## 2017-11-13 PROCEDURE — 88305 TISSUE EXAM BY PATHOLOGIST: CPT | Mod: 26,,, | Performed by: PATHOLOGY

## 2017-11-13 PROCEDURE — 81025 URINE PREGNANCY TEST: CPT | Mod: S$GLB,,, | Performed by: OBSTETRICS & GYNECOLOGY

## 2017-11-13 PROCEDURE — 88305 TISSUE EXAM BY PATHOLOGIST: CPT | Performed by: PATHOLOGY

## 2017-11-13 NOTE — PROCEDURES
Endometrial Biopsy- Today  Date/Time: 11/13/2017 3:13 PM  Performed by: BUSHRA LUCIANO.  Authorized by: BUSHRA LUCIANO   Preparation: Patient was prepped and draped in the usual sterile fashion.  Local anesthesia used: no    Anesthesia:  Local anesthesia used: no    Sedation:  Patient sedated: no  Patient tolerance: Patient tolerated the procedure well with no immediate complications  Comments: Patient is here for endometrial biopsy due to menorrhagia.    UPT today: Negative    PRE ENDOMETRIAL BIOPSY COUNSELING:  The patient was informed of the risk of bleeding, infection, uterine perforation and pain and that the test will rule-out endometrial cancer with accuracy greater than 95%. She was counseled on the alternatives to endometrial biopsy and agrees to proceed.    TIME OUT PERFORMED.  The cervix was visualized with a speculum.  No additional instrumentation was required.  A sterile endometrial pipelle was passed without difficulty to a depth of 8 cm.  Scant amount of tissue was obtained on two passes.  Adequate hemostasis.  Pt tolerated well.  EBL 1 mL.    The specimen was placed in formalyn and sent to Pathology for histology evaluation.    POST ENDOMETRIAL BIOPSY COUNSELING:  Manage post biopsy cramping with NSAIDs or Tylenol.  Expect spotting or light bleeding for a few days.  Report bleeding heavier than a period, fever > 101.0 F, worsening pain or a foul smelling vaginal discharge.    Counseling lasted approximately 15 minutes and all her questions were answered.    FOLLOW-UP: Pending biopsy results.  If benign, OK to proceed with surgery as scheduled.

## 2017-11-21 ENCOUNTER — OFFICE VISIT (OUTPATIENT)
Dept: OBSTETRICS AND GYNECOLOGY | Facility: CLINIC | Age: 47
End: 2017-11-21
Payer: COMMERCIAL

## 2017-11-21 ENCOUNTER — HOSPITAL ENCOUNTER (OUTPATIENT)
Dept: PREADMISSION TESTING | Facility: HOSPITAL | Age: 47
Discharge: HOME | End: 2017-11-21
Attending: OBSTETRICS & GYNECOLOGY
Payer: COMMERCIAL

## 2017-11-21 VITALS
SYSTOLIC BLOOD PRESSURE: 108 MMHG | WEIGHT: 161.38 LBS | BODY MASS INDEX: 26.89 KG/M2 | HEIGHT: 65 IN | DIASTOLIC BLOOD PRESSURE: 80 MMHG

## 2017-11-21 VITALS — BODY MASS INDEX: 26.66 KG/M2 | WEIGHT: 160 LBS | HEIGHT: 65 IN

## 2017-11-21 DIAGNOSIS — N92.0 MENORRHAGIA WITH REGULAR CYCLE: Primary | ICD-10-CM

## 2017-11-21 PROCEDURE — 99999 PR PBB SHADOW E&M-EST. PATIENT-LVL II: CPT | Mod: PBBFAC,,, | Performed by: OBSTETRICS & GYNECOLOGY

## 2017-11-21 PROCEDURE — 99499 UNLISTED E&M SERVICE: CPT | Mod: S$GLB,,, | Performed by: OBSTETRICS & GYNECOLOGY

## 2017-11-21 NOTE — PROGRESS NOTES
Pt is here for pre-operative visit.  Pt reports no complaints.  Ready for surgery.    ROS Negative     Physical Exam:  See H+P    A/P:  Menorrhagia with regular cycle  -     Procedure risks, benefits, and alternatives were discussed.  Pt was again reminded of need for continued contraception after ablation and on risks of pregnancy if it were to occur.  Pt voiced understanding and expressed consent for HSC with Novasure Ablation.  Hospital forms were reviewed with pt and signed.  Pre-operative instructions were given.

## 2017-11-21 NOTE — H&P
[facility]  Obstetrics & Gynecology  History & Physical    Patient Name: Liana Keane  MRN: [id number]  Admission Date: (Not on file)  Primary Care Provider: Rhoda Charles MD    Subjective:     Chief Complaint/Reason for Admission: surgery    History of Present Illness:   48 yo  with heavy menses who is here for scheduled ablation surgery.  Pt reports no new complaints and is ready for surgery.    Current Outpatient Prescriptions on File Prior to Visit   Medication Sig    albuterol (PROAIR HFA) 90 mcg/actuation inhaler Inhale 2 puffs into the lungs every 6 (six) hours as needed.    drospirenone-ethinyl estradiol (VESTURA, 28,) 3-0.02 mg per tablet Take 1 tablet by mouth once daily.     No current facility-administered medications on file prior to visit.        Review of patient's allergies indicates:  No Known Allergies    Past Medical History:   Diagnosis Date    Acne     Allergic rhinitis     Anemia     Asthma     Herpes simplex type II infection     Multinodular goiter      OB History    Para Term  AB Living   4 4 2     2   SAB TAB Ectopic Multiple Live Births                  # Outcome Date GA Lbr Shahram/2nd Weight Sex Delivery Anes PTL Lv   4 Term            3 Term            2 Para      Vag-Spont      1 Para      Vag-Spont           Past Surgical History:   Procedure Laterality Date    FNA thyroid nodule  2014    HERNIA REPAIR      THYROIDECTOMY, PARTIAL  2015     Family History     Problem Relation (Age of Onset)    Allergic rhinitis Sister    Asthma Sister    Cancer Other    Diabetes Maternal Grandfather    Hypertension Mother, Maternal Grandmother    Thyroid disease Mother        Social History Main Topics    Smoking status: Never Smoker    Smokeless tobacco: Never Used    Alcohol use Yes      Comment: Occasionally    Drug use: No    Sexual activity: Not Currently     Birth control/ protection: OCP     Review of Systems   Constitutional: Negative for  activity change, appetite change, fatigue, fever and unexpected weight change.   Respiratory: Negative for shortness of breath.    Cardiovascular: Negative for chest pain, palpitations and leg swelling.   Gastrointestinal: Negative for abdominal pain, constipation, diarrhea, nausea and vomiting.   Genitourinary: Positive for menorrhagia, menstrual problem and dysmenorrhea. Negative for dyspareunia, dysuria, flank pain, frequency, genital sores, hematuria, pelvic pain, vaginal bleeding, vaginal discharge, vaginal pain, urinary incontinence and vaginal odor.   Musculoskeletal: Negative for back pain.   Neurological: Negative for syncope and headaches.     Objective:     Vital Signs (Most Recent):    Vital Signs (24h Range):  [unfilled]        There is no height or weight on file to calculate BMI.  Patient's last menstrual period was 10/26/2017.    Physical Exam:   Constitutional: She is oriented to person, place, and time. She appears well-developed and well-nourished. No distress.    HENT:   Head: Normocephalic and atraumatic.    Eyes: EOM are normal. Pupils are equal, round, and reactive to light.    Neck: Normal range of motion. Neck supple.    Cardiovascular: Normal rate, regular rhythm and normal heart sounds.     Pulmonary/Chest: Effort normal and breath sounds normal.        Abdominal: Soft. Bowel sounds are normal. She exhibits no distension. There is no tenderness.             Musculoskeletal: Normal range of motion and moves all extremeties. She exhibits no edema or tenderness.       Neurological: She is alert and oriented to person, place, and time.    Skin: Skin is warm and dry.    Psychiatric: She has a normal mood and affect. Her behavior is normal. Thought content normal.       Laboratory:  I have personallly reviewed all pertinent lab results from the last 24 hours.    Diagnostic Results:  Labs: Reviewed  US: Reviewed  Pap and Pathology reviewed    Assessment/Plan:     There are no hospital problems to  display for this patient.    Menorrhagia with regular cycle  -     Procedure risks, benefits, and alternatives were discussed.  Pt was again reminded of need for continued contraception after ablation and on risks of pregnancy if it were to occur.  Pt voiced understanding and expressed consent for HSC with Novasure Ablation.  Hospital forms were reviewed with pt and signed.  Pre-operative instructions were given.        Rik Francisco MD  Obstetrics & Gynecology  [facility]

## 2017-11-21 NOTE — PLAN OF CARE
Patient visit for pre op instructions.  Pre operative instruction booklet given  Hibiclens given to patient with showering instructions

## 2017-11-21 NOTE — DISCHARGE INSTRUCTIONS
To confirm, Your doctor has instructed you that surgery is scheduled for 12/27/17  at  09:30 a.m.       Please report to [facility], 1st floor, main lobby by 08:00 a.m .    Pre admit office will call afternoon prior to surgery with final arrival time    INSTRUCTIONS IMPORTANT!!!   Do not eat, drink, or smoke after 12 midnight-including water. OK to brush teeth, no gum, candy or mints!    ¨ Take only these medicines with a small swallow of water-morning of surgery.  N/A        Pre operative instructions:  Please review the Pre-Operative Instruction booklet that you were given.        Bathing Instructions--See page 6 in the Pre-operative booklet.      Prevention of surgical site infections:     -Keep incisions clean and dry.   -Do not soak/submerge incisions in water until completely healed.   -Do not apply lotions, powders, creams, or deodorants to site.   -Always make sure hands are cleaned with antibacterial soap/ alcohol-based                 prior to touching the surgical site.  (This includes doctors,                 nurses, staff, and yourself.)    Signs and symptoms:   -Redness and pain around the area where you had surgery   -Drainage of cloudy fluid from your surgical wound   -Fever over 100.4       I have read or had read and explained to me, and understand the above information.  Additional comments or instructions:  Received a copy of Pre-operative instructions booklet, FAQ surgical site infection sheet, and packets of hibiclens (if indicated).

## 2017-12-26 ENCOUNTER — ANESTHESIA EVENT (OUTPATIENT)
Dept: SURGERY | Facility: HOSPITAL | Age: 47
End: 2017-12-26
Payer: COMMERCIAL

## 2017-12-27 ENCOUNTER — SURGERY (OUTPATIENT)
Age: 47
End: 2017-12-27

## 2017-12-27 ENCOUNTER — ANESTHESIA (OUTPATIENT)
Dept: SURGERY | Facility: HOSPITAL | Age: 47
End: 2017-12-27
Payer: COMMERCIAL

## 2017-12-27 ENCOUNTER — HOSPITAL ENCOUNTER (OUTPATIENT)
Facility: HOSPITAL | Age: 47
Discharge: HOME | End: 2017-12-27
Attending: OBSTETRICS & GYNECOLOGY | Admitting: OBSTETRICS & GYNECOLOGY
Payer: COMMERCIAL

## 2017-12-27 VITALS
HEIGHT: 66 IN | OXYGEN SATURATION: 99 % | RESPIRATION RATE: 14 BRPM | SYSTOLIC BLOOD PRESSURE: 141 MMHG | TEMPERATURE: 98 F | WEIGHT: 160.69 LBS | BODY MASS INDEX: 25.83 KG/M2 | DIASTOLIC BLOOD PRESSURE: 91 MMHG | HEART RATE: 89 BPM

## 2017-12-27 DIAGNOSIS — N92.0 MENORRHAGIA WITH REGULAR CYCLE: ICD-10-CM

## 2017-12-27 DIAGNOSIS — Z98.890 STATUS POST HYSTEROSCOPIC ABLATION OF ENDOMETRIUM: Primary | ICD-10-CM

## 2017-12-27 LAB
B-HCG UR QL: NEGATIVE
CTP QC/QA: YES

## 2017-12-27 PROCEDURE — 71000015 HC POSTOP RECOV 1ST HR: Performed by: OBSTETRICS & GYNECOLOGY

## 2017-12-27 PROCEDURE — 36000706: Performed by: OBSTETRICS & GYNECOLOGY

## 2017-12-27 PROCEDURE — 25000003 PHARM REV CODE 250: Performed by: ANESTHESIOLOGY

## 2017-12-27 PROCEDURE — 25000003 PHARM REV CODE 250: Performed by: CLINIC/CENTER

## 2017-12-27 PROCEDURE — C1782 MORCELLATOR: HCPCS | Performed by: OBSTETRICS & GYNECOLOGY

## 2017-12-27 PROCEDURE — 88305 TISSUE EXAM BY PATHOLOGIST: CPT | Mod: 26,,, | Performed by: PATHOLOGY

## 2017-12-27 PROCEDURE — 58563 HYSTEROSCOPY ABLATION: CPT | Mod: 51,,, | Performed by: OBSTETRICS & GYNECOLOGY

## 2017-12-27 PROCEDURE — 88305 TISSUE EXAM BY PATHOLOGIST: CPT | Performed by: PATHOLOGY

## 2017-12-27 PROCEDURE — 36000707: Performed by: OBSTETRICS & GYNECOLOGY

## 2017-12-27 PROCEDURE — 27201423 OPTIME MED/SURG SUP & DEVICES STERILE SUPPLY: Performed by: OBSTETRICS & GYNECOLOGY

## 2017-12-27 PROCEDURE — 58561 HYSTEROSCOPY REMOVE MYOMA: CPT | Mod: ,,, | Performed by: OBSTETRICS & GYNECOLOGY

## 2017-12-27 PROCEDURE — 71000033 HC RECOVERY, INTIAL HOUR: Performed by: OBSTETRICS & GYNECOLOGY

## 2017-12-27 PROCEDURE — 37000008 HC ANESTHESIA 1ST 15 MINUTES: Performed by: OBSTETRICS & GYNECOLOGY

## 2017-12-27 PROCEDURE — 37000009 HC ANESTHESIA EA ADD 15 MINS: Performed by: OBSTETRICS & GYNECOLOGY

## 2017-12-27 PROCEDURE — 63600175 PHARM REV CODE 636 W HCPCS: Performed by: CLINIC/CENTER

## 2017-12-27 RX ORDER — DIPHENHYDRAMINE HYDROCHLORIDE 50 MG/ML
25 INJECTION INTRAMUSCULAR; INTRAVENOUS EVERY 6 HOURS PRN
Status: DISCONTINUED | OUTPATIENT
Start: 2017-12-27 | End: 2017-12-27 | Stop reason: SDUPTHER

## 2017-12-27 RX ORDER — LIDOCAINE HYDROCHLORIDE 10 MG/ML
INJECTION INFILTRATION; PERINEURAL
Status: DISCONTINUED | OUTPATIENT
Start: 2017-12-27 | End: 2017-12-27

## 2017-12-27 RX ORDER — HYDROCODONE BITARTRATE AND ACETAMINOPHEN 10; 325 MG/1; MG/1
1 TABLET ORAL EVERY 4 HOURS PRN
Status: DISCONTINUED | OUTPATIENT
Start: 2017-12-27 | End: 2017-12-27 | Stop reason: HOSPADM

## 2017-12-27 RX ORDER — ONDANSETRON 8 MG/1
8 TABLET, ORALLY DISINTEGRATING ORAL EVERY 8 HOURS PRN
Status: DISCONTINUED | OUTPATIENT
Start: 2017-12-27 | End: 2017-12-27 | Stop reason: HOSPADM

## 2017-12-27 RX ORDER — ROCURONIUM BROMIDE 10 MG/ML
INJECTION, SOLUTION INTRAVENOUS
Status: DISCONTINUED | OUTPATIENT
Start: 2017-12-27 | End: 2017-12-27

## 2017-12-27 RX ORDER — HYDROCODONE BITARTRATE AND ACETAMINOPHEN 5; 325 MG/1; MG/1
1 TABLET ORAL EVERY 4 HOURS PRN
Status: DISCONTINUED | OUTPATIENT
Start: 2017-12-27 | End: 2017-12-27 | Stop reason: HOSPADM

## 2017-12-27 RX ORDER — HYDROMORPHONE HYDROCHLORIDE 1 MG/ML
0.2 INJECTION, SOLUTION INTRAMUSCULAR; INTRAVENOUS; SUBCUTANEOUS EVERY 5 MIN PRN
Status: DISCONTINUED | OUTPATIENT
Start: 2017-12-27 | End: 2017-12-27 | Stop reason: HOSPADM

## 2017-12-27 RX ORDER — HYDROCODONE BITARTRATE AND ACETAMINOPHEN 5; 325 MG/1; MG/1
1 TABLET ORAL EVERY 4 HOURS PRN
Qty: 10 TABLET | Refills: 0 | Status: SHIPPED | OUTPATIENT
Start: 2017-12-27 | End: 2018-01-25

## 2017-12-27 RX ORDER — MIDAZOLAM HYDROCHLORIDE 1 MG/ML
INJECTION, SOLUTION INTRAMUSCULAR; INTRAVENOUS
Status: DISCONTINUED | OUTPATIENT
Start: 2017-12-27 | End: 2017-12-27

## 2017-12-27 RX ORDER — ONDANSETRON 2 MG/ML
INJECTION INTRAMUSCULAR; INTRAVENOUS
Status: DISCONTINUED | OUTPATIENT
Start: 2017-12-27 | End: 2017-12-27

## 2017-12-27 RX ORDER — SODIUM CHLORIDE 0.9 % (FLUSH) 0.9 %
3 SYRINGE (ML) INJECTION EVERY 8 HOURS
Status: DISCONTINUED | OUTPATIENT
Start: 2017-12-27 | End: 2017-12-27 | Stop reason: HOSPADM

## 2017-12-27 RX ORDER — FENTANYL CITRATE 50 UG/ML
INJECTION, SOLUTION INTRAMUSCULAR; INTRAVENOUS
Status: DISCONTINUED | OUTPATIENT
Start: 2017-12-27 | End: 2017-12-27

## 2017-12-27 RX ORDER — SUCCINYLCHOLINE CHLORIDE 20 MG/ML
INJECTION INTRAMUSCULAR; INTRAVENOUS
Status: DISCONTINUED | OUTPATIENT
Start: 2017-12-27 | End: 2017-12-27

## 2017-12-27 RX ORDER — HYDROMORPHONE HYDROCHLORIDE 1 MG/ML
1 INJECTION, SOLUTION INTRAMUSCULAR; INTRAVENOUS; SUBCUTANEOUS EVERY 4 HOURS PRN
Status: DISCONTINUED | OUTPATIENT
Start: 2017-12-27 | End: 2017-12-27 | Stop reason: HOSPADM

## 2017-12-27 RX ORDER — DIPHENHYDRAMINE HYDROCHLORIDE 50 MG/ML
25 INJECTION INTRAMUSCULAR; INTRAVENOUS EVERY 4 HOURS PRN
Status: DISCONTINUED | OUTPATIENT
Start: 2017-12-27 | End: 2017-12-27 | Stop reason: HOSPADM

## 2017-12-27 RX ORDER — GLYCOPYRROLATE 0.2 MG/ML
INJECTION INTRAMUSCULAR; INTRAVENOUS
Status: DISCONTINUED | OUTPATIENT
Start: 2017-12-27 | End: 2017-12-27

## 2017-12-27 RX ORDER — DIPHENHYDRAMINE HCL 25 MG
25 CAPSULE ORAL EVERY 4 HOURS PRN
Status: DISCONTINUED | OUTPATIENT
Start: 2017-12-27 | End: 2017-12-27 | Stop reason: HOSPADM

## 2017-12-27 RX ORDER — SODIUM CHLORIDE, SODIUM LACTATE, POTASSIUM CHLORIDE, CALCIUM CHLORIDE 600; 310; 30; 20 MG/100ML; MG/100ML; MG/100ML; MG/100ML
INJECTION, SOLUTION INTRAVENOUS CONTINUOUS
Status: DISCONTINUED | OUTPATIENT
Start: 2017-12-27 | End: 2017-12-27 | Stop reason: HOSPADM

## 2017-12-27 RX ORDER — ONDANSETRON 2 MG/ML
4 INJECTION INTRAMUSCULAR; INTRAVENOUS DAILY PRN
Status: DISCONTINUED | OUTPATIENT
Start: 2017-12-27 | End: 2017-12-27 | Stop reason: HOSPADM

## 2017-12-27 RX ORDER — PROPOFOL 10 MG/ML
VIAL (ML) INTRAVENOUS
Status: DISCONTINUED | OUTPATIENT
Start: 2017-12-27 | End: 2017-12-27

## 2017-12-27 RX ORDER — PROMETHAZINE HYDROCHLORIDE 25 MG/1
25 TABLET ORAL EVERY 6 HOURS PRN
Status: DISCONTINUED | OUTPATIENT
Start: 2017-12-27 | End: 2017-12-27 | Stop reason: HOSPADM

## 2017-12-27 RX ORDER — SODIUM CHLORIDE 0.9 % (FLUSH) 0.9 %
3 SYRINGE (ML) INJECTION
Status: DISCONTINUED | OUTPATIENT
Start: 2017-12-27 | End: 2017-12-27 | Stop reason: HOSPADM

## 2017-12-27 RX ORDER — KETOROLAC TROMETHAMINE 30 MG/ML
30 INJECTION, SOLUTION INTRAMUSCULAR; INTRAVENOUS ONCE
Status: DISCONTINUED | OUTPATIENT
Start: 2017-12-27 | End: 2017-12-27 | Stop reason: HOSPADM

## 2017-12-27 RX ORDER — MEPERIDINE HYDROCHLORIDE 50 MG/ML
12.5 INJECTION INTRAMUSCULAR; INTRAVENOUS; SUBCUTANEOUS ONCE AS NEEDED
Status: DISCONTINUED | OUTPATIENT
Start: 2017-12-27 | End: 2017-12-27 | Stop reason: HOSPADM

## 2017-12-27 RX ADMIN — PROPOFOL 140 MG: 10 INJECTION, EMULSION INTRAVENOUS at 11:12

## 2017-12-27 RX ADMIN — SUCCINYLCHOLINE CHLORIDE 100 MG: 20 INJECTION, SOLUTION INTRAMUSCULAR; INTRAVENOUS at 11:12

## 2017-12-27 RX ADMIN — FENTANYL CITRATE 25 MCG: 50 INJECTION, SOLUTION INTRAMUSCULAR; INTRAVENOUS at 11:12

## 2017-12-27 RX ADMIN — LIDOCAINE HYDROCHLORIDE 60 MG: 10 INJECTION, SOLUTION INFILTRATION; PERINEURAL at 11:12

## 2017-12-27 RX ADMIN — FENTANYL CITRATE 25 MCG: 50 INJECTION, SOLUTION INTRAMUSCULAR; INTRAVENOUS at 12:12

## 2017-12-27 RX ADMIN — MIDAZOLAM HYDROCHLORIDE 2 MG: 1 INJECTION, SOLUTION INTRAMUSCULAR; INTRAVENOUS at 11:12

## 2017-12-27 RX ADMIN — PROPOFOL 50 MG: 10 INJECTION, EMULSION INTRAVENOUS at 11:12

## 2017-12-27 RX ADMIN — SODIUM CHLORIDE, SODIUM LACTATE, POTASSIUM CHLORIDE, AND CALCIUM CHLORIDE: 600; 310; 30; 20 INJECTION, SOLUTION INTRAVENOUS at 11:12

## 2017-12-27 RX ADMIN — FENTANYL CITRATE 100 MCG: 50 INJECTION, SOLUTION INTRAMUSCULAR; INTRAVENOUS at 11:12

## 2017-12-27 RX ADMIN — ONDANSETRON 4 MG: 2 INJECTION, SOLUTION INTRAMUSCULAR; INTRAVENOUS at 11:12

## 2017-12-27 RX ADMIN — ROCURONIUM BROMIDE 5 MG: 10 INJECTION, SOLUTION INTRAVENOUS at 11:12

## 2017-12-27 RX ADMIN — ROBINUL 0.2 MG: 0.2 INJECTION INTRAMUSCULAR; INTRAVENOUS at 11:12

## 2017-12-27 NOTE — ANESTHESIA POSTPROCEDURE EVALUATION
"Anesthesia Post Evaluation    Patient: Liana Keane    Procedure(s) Performed: Procedure(s) (LRB):  ABLATION ENDOMETRIAL THERMAL - NOVASURE (N/A)  MYOMECTOMY-HYSTEROSCOPIC (N/A)  HYSTEROSCOPY (N/A)    Final Anesthesia Type: general  Patient location during evaluation: PACU  Patient participation: Yes- Able to Participate  Level of consciousness: awake and alert  Post-procedure vital signs: reviewed and stable  Pain management: adequate  Airway patency: patent  PONV status at discharge: No PONV  Anesthetic complications: no      Cardiovascular status: hemodynamically stable  Respiratory status: spontaneous ventilation  Hydration status: euvolemic  Follow-up not needed.        Visit Vitals  BP (!) 141/91   Pulse 89   Temp 36.5 °C (97.7 °F) (Temporal)   Resp 14   Ht 5' 6" (1.676 m)   Wt 72.9 kg (160 lb 11.5 oz)   SpO2 99%   Breastfeeding? No   BMI 25.94 kg/m²       Pain/Janes Score: Pain Assessment Performed: Yes (12/27/2017  1:15 PM)  Presence of Pain: denies (12/27/2017  1:15 PM)  Janes Score: 10 (12/27/2017  1:15 PM)      "

## 2017-12-27 NOTE — PLAN OF CARE
Pt denies pain at present. Respirations even and unlabored on room air and tolerating well. Alma pad with scant amt of bloody drainage. VSS. Will cont to monitor. Pt denies pain at present.

## 2017-12-27 NOTE — ASSESSMENT & PLAN NOTE
Procedure risks, benefits, and alternatives were discussed.  Pt was again reminded of need for continued contraception after ablation and on risks of pregnancy if it were to occur.  Pt voiced understanding and expressed consent for HSC with Novasure Ablation.  Hospital forms were reviewed with pt and signed.  Pre-operative instructions were given.

## 2017-12-27 NOTE — DISCHARGE INSTRUCTIONS
General Information:    1. Do not drink alcoholic beverages including beer for 24 hours or as long as you are on pain medication..  2. Do not drive a motor vehicle, operate machinery or power tools, or signs legal papers for 24 hours or as long as you are on pain medication.   3. You may experience light-headedness, dizziness, and sleepiness following surgery. Please do not stay alone. A responsible adult should be with you for this 24 hour period.  4. Go home and rest.  5. Progress slowly to a normal diet unless instructed.  Otherwise, begin with liquids such as soft drinks, then soup and crackers working up to solid foods. Drink plenty of nonalcoholic fluids.  6. Certain anesthetics and pain medications produce nausea and vomiting in certain individuals. If nausea becomes a problem at home, call you doctor.  7. A nurse will be calling you sometime after surgery. Do not be alarmed. This is our way of finding out how you are doing.  8. Several times every hour while you are awake, take 2-3 deep breaths and cough. If you had stomach surgery hold a pillow or rolled towel firmly against your stomach before you cough. This will help with any pain the cough might cause.  9. Several times every hour while you are awake, pump and flex your feet 5-6 times and do foot circles. This will help prevent blood clots.  10. Call your doctor for severe pain, bleeding, fever, or signs or symptoms of infection (pain, swelling, redness, foul odor, drainage).  11. You can contact your doctor anytime by [phone] for the [facility] (at [facility]) or [phone] for the [facility] on [facility].   [email or web address] is another way to contact your doctor if you are an active participant online with [facility].

## 2017-12-27 NOTE — ANESTHESIA PREPROCEDURE EVALUATION
12/27/2017  Liana Keane is a 47 y.o., female.    Anesthesia Evaluation    I have reviewed the Patient Summary Reports.        Review of Systems  Anesthesia Hx:  Denies Hx of Anesthetic complications  Denies Family Hx of Anesthesia complications.   Denies Personal Hx of Anesthesia complications.   Social:  Non-Smoker    Hematology/Oncology:     Oncology Normal    -- Anemia: chronic   EENT/Dental:EENT/Dental Normal   Cardiovascular:   Exercise tolerance: good  Functional Capacity good / => 4 METS  Denies Coronary Artery Disease.  Denies Valvular Heart Disease.    Denies Congestive Heart Failure (CHF)    Pulmonary:   Asthma mild  Asthma:   Current breathing status is optimal, free of wheezing.    Renal/:  Renal/ Normal   Denies Renal Symptoms/Infections/Stones    Hepatic/GI:  Hepatic/GI Normal  Denies Hepatic/GI Symptoms    Musculoskeletal:  Musculoskeletal Normal  Denies Musculoskeletal General/Symptoms    OB/GYN/PEDS:  Menorrhagia   Neurological:  Neurology Normal   no Neuro Symptoms   Endocrine:  Endocrine Normal  Denies Diabetes  Denies Thyroid Disease    Dermatological:  Skin Normal    Psych:  Psychiatric Normal           Physical Exam  General:  Well nourished    Airway/Jaw/Neck:  Airway Findings: Mouth Opening: Normal Tongue: Normal  General Airway Assessment: Adult  Mallampati: II  TM Distance: Normal, at least 6 cm       Chest/Lungs:  Chest/Lungs Findings: Normal Respiratory Rate     Heart/Vascular:  Heart Findings: Rate: Normal        Mental Status:  Mental Status Findings:  Cooperative, Alert and Oriented         Anesthesia Plan  Type of Anesthesia, risks & benefits discussed:  Anesthesia Type:  general  Patient's Preference:   Intra-op Monitoring Plan:   Intra-op Monitoring Plan Comments:   Post Op Pain Control Plan:   Post Op Pain Control Plan Comments:   Induction:   IV  Beta Blocker:   Patient is not currently on a Beta-Blocker (No further documentation required).       Informed Consent:  Anesthesia consent signed with patient.  ASA Score: 2     Day of Surgery Review of History & Physical:            Ready For Surgery From Anesthesia Perspective.

## 2017-12-27 NOTE — TRANSFER OF CARE
"Anesthesia Transfer of Care Note    Patient: Liana Keane    Procedure(s) Performed: Procedure(s) (LRB):  ABLATION ENDOMETRIAL THERMAL - NOVASURE (N/A)  MYOMECTOMY-HYSTEROSCOPIC (N/A)  HYSTEROSCOPY (N/A)    Patient location: PACU    Anesthesia Type: general    Transport from OR: Transported from OR on room air with adequate spontaneous ventilation    Post pain: adequate analgesia    Post assessment: no apparent anesthetic complications and tolerated procedure well    Post vital signs: stable    Level of consciousness: awake    Nausea/Vomiting: no nausea/vomiting    Complications: none    Transfer of care protocol was followed      Last vitals:   Visit Vitals  /72 (BP Location: Right arm, Patient Position: Sitting)   Pulse 75   Temp 37.5 °C (99.5 °F) (Tympanic)   Resp 12   Ht 5' 6" (1.676 m)   Wt 72.9 kg (160 lb 11.5 oz)   SpO2 100%   Breastfeeding? No   BMI 25.94 kg/m²     "

## 2017-12-27 NOTE — HPI
46 yo  with heavy menses who is here for scheduled ablation surgery.  Pt reports no new complaints and is ready for surgery.

## 2017-12-27 NOTE — H&P
[facility] -   Obstetrics & Gynecology  History & Physical    Patient Name: Liaan Keane  MRN: [id number]  Admission Date: 2017  Primary Care Provider: Rhoda Charles MD    Subjective:     Chief Complaint/Reason for Admission: surgery    History of Present Illness:  46 yo  with heavy menses who is here for scheduled ablation surgery.  Pt reports no new complaints and is ready for surgery.        Obstetric History       T2      L2     SAB0   TAB0   Ectopic0   Multiple0   Live Births0       # Outcome Date GA Lbr Shahram/2nd Weight Sex Delivery Anes PTL Lv   4 Term            3 Term            2 Para      Vag-Spont      1 Para      Vag-Spont           Past Medical History:   Diagnosis Date    Acne     Allergic rhinitis     Anemia     Asthma     Herpes simplex type II infection     Multinodular goiter      Past Surgical History:   Procedure Laterality Date    EVACUATION EPIDURAL HEMATOMA      following thyroidectomy    FNA thyroid nodule  2014    HERNIA REPAIR      umbilical     THYROIDECTOMY, PARTIAL  2015       PTA Medications   Medication Sig    drospirenone-ethinyl estradiol (VESTURA, 28,) 3-0.02 mg per tablet Take 1 tablet by mouth once daily.    multivitamin-Ca-iron-minerals Tab Take by mouth once daily.    albuterol (PROAIR HFA) 90 mcg/actuation inhaler Inhale 2 puffs into the lungs every 6 (six) hours as needed.       Review of patient's allergies indicates:  No Known Allergies     Family History     Problem Relation (Age of Onset)    Allergic rhinitis Sister    Asthma Sister    Cancer Other    Diabetes Maternal Grandfather    Hypertension Mother, Maternal Grandmother    Thyroid disease Mother        Social History Main Topics    Smoking status: Never Smoker    Smokeless tobacco: Never Used    Alcohol use Yes      Comment: Occasionally  No alcohol 72 h prior to sx    Drug use: No    Sexual activity: Not Currently     Birth control/  protection: OCP     Review of Systems   Constitutional: Positive for fatigue. Negative for activity change, appetite change, fever and unexpected weight change.   Respiratory: Negative for shortness of breath.    Cardiovascular: Negative for chest pain, palpitations and leg swelling.   Gastrointestinal: Negative for abdominal pain, constipation, diarrhea, nausea and vomiting.   Genitourinary: Positive for menorrhagia, menstrual problem and dysmenorrhea. Negative for dyspareunia, dysuria, flank pain, frequency, genital sores, hematuria, vaginal bleeding, vaginal discharge, vaginal pain, urinary incontinence and vaginal odor.   Musculoskeletal: Negative for back pain.   Neurological: Negative for syncope and headaches.      Objective:     Vital Signs (Most Recent):  Temp: 99.5 °F (37.5 °C) (12/27/17 0753)  Pulse: 75 (12/27/17 0753)  Resp: 12 (12/27/17 0753)  BP: 116/72 (12/27/17 0753)  SpO2: 100 % (12/27/17 0753) Vital Signs (24h Range):  Temp:  [99.5 °F (37.5 °C)] 99.5 °F (37.5 °C)  Pulse:  [75] 75  Resp:  [12] 12  SpO2:  [100 %] 100 %  BP: (116)/(72) 116/72     Weight: 72.9 kg (160 lb 11.5 oz)  Body mass index is 25.94 kg/m².    No LMP recorded.    Physical Exam:   Constitutional: She is oriented to person, place, and time. She appears well-developed and well-nourished. No distress.    HENT:   Head: Normocephalic and atraumatic.    Eyes: EOM are normal. Pupils are equal, round, and reactive to light.    Neck: Normal range of motion. Neck supple.    Cardiovascular: Normal rate, regular rhythm and normal heart sounds.     Pulmonary/Chest: Effort normal and breath sounds normal.        Abdominal: Soft. Bowel sounds are normal. She exhibits no distension. There is no tenderness.             Musculoskeletal: Normal range of motion and moves all extremeties. She exhibits no edema or tenderness.       Neurological: She is alert and oriented to person, place, and time.    Skin: Skin is warm and dry.    Psychiatric: She has  a normal mood and affect. Her behavior is normal. Thought content normal.       Laboratory:  I have personallly reviewed all pertinent lab results from the last 24 hours.    Diagnostic Results:  Labs: Reviewed  US: Reviewed  Pap and Pathology reviewed    Assessment/Plan:     Menorrhagia with regular cycle    Procedure risks, benefits, and alternatives were discussed.  Pt was again reminded of need for continued contraception after ablation and on risks of pregnancy if it were to occur.  Pt voiced understanding and expressed consent for HSC with Novasure Ablation.  Hospital forms were reviewed with pt and signed.  Pre-operative instructions were given.            Rik Francisco MD  Obstetrics & Gynecology  [facility] -

## 2017-12-27 NOTE — DISCHARGE SUMMARY
Discharge Note  Short Stay      SUMMARY     Admit Date: 12/27/2017    Attending Physician: Rik Francisco MD     Discharge Physician: Rik Francisco MD    Discharge Date: 12/27/2017 12:51 PM    Final Diagnosis:   1. Status post hysteroscopic ablation of endometrium    2. Menorrhagia with regular cycle        Disposition: Home or Self Care    Condition:  Stable    Hospital Course:  Pt was admitted for scheduled surgery.  HSC Myosure Myomectomy/Novasure Ablation was performed without complications.  Post-operative course was unremarkable and pt recovered well.  Pt was discharged upon meeting all discharge criteria.  Pt was counseled on post-operative care and warning sign instructions prior to her discharge.    Patient Instructions:   Current Discharge Medication List      START taking these medications    Details   hydrocodone-acetaminophen 5-325mg (NORCO) 5-325 mg per tablet Take 1 tablet by mouth every 4 (four) hours as needed for Pain.  Qty: 10 tablet, Refills: 0    Associated Diagnoses: Status post hysteroscopic ablation of endometrium         CONTINUE these medications which have NOT CHANGED    Details   multivitamin-Ca-iron-minerals Tab Take by mouth once daily.      albuterol (PROAIR HFA) 90 mcg/actuation inhaler Inhale 2 puffs into the lungs every 6 (six) hours as needed.  Qty: 18 g, Refills: 11         STOP taking these medications       drospirenone-ethinyl estradiol (VESTURA, 28,) 3-0.02 mg per tablet Comments:   Reason for Stopping:               Discharge Procedure Orders (must include Diet, Follow-up, Activity)    Discharge Procedure Orders (must include Diet, Follow-up, Activity)  Diet general     Other restrictions (specify):   Order Comments: Light activity x 1 week and Pelvic Rest x 2 weeks     Call MD for:  temperature >100.4     Call MD for:  persistent nausea and vomiting     Call MD for:  severe uncontrolled pain     Call MD for:  difficulty breathing, headache or visual disturbances     Call  MD for:  hives     Call MD for:  persistent dizziness or light-headedness     Call MD for:  extreme fatigue        Follow up in 4 weeks with Dr. Francisco

## 2017-12-27 NOTE — OP NOTE
DATE:  12/27/2017    PRE-PROCEDURE COUNSELING:  Patient counseled on the risks, benefits, and alternatives to procedure.  Please see preoperative consents.   SCDs were applied and working prior to anesthesia induction.    PRE-PROCEDURE DIAGNOSIS: Menorrhagia    POST-PROCEDURE DIAGNOSIS: Same    ANESTHESIA: General Endotracheal Anesthesia    PROCEDURE:  Hysteroscopy, Myosure Myomectomy, Novasure Endometrial Ablation    SURGEON:  Rik Francisco MD    ASSISTANT: Luana Sierra CST    FINDINGS: Normal appearing endometrial lining with a solitary 2 cm intracavitary fibroid on left anterior uterus; small uterus with moderate descent and no adnexal masses, uterus sounded to 7 cm.  Novasure details: sounding length 7 cm, cervix length 3 cm, cavity length 4 cm, cavity width 4 cm, power 88 benson, time 71 seconds.    SPECIMEN: Fibroid    EBL: 25 mL    COMPLICATIONS:  None    IMPLANTS:  None    PROCEDURE IN DETAIL:  TIME OUT PERFORMED  SCDs were on prior to anesthesia induction.  Bimanual examination was performed after anesthesia was obtained and above findings noted.  Patient was placed in dorsal lithotomy position then prepped and draped in standard fashion.  A weighted speculum was placed into the vagina and a single toothed tenaculum was applied to the anterior lip of the cervix.  Uterus was sounded to 7 cm and cervix length 3 cm (cavity length 4 cm).  The cervix was then sequentially dilated using the Lissa dilators.  The hysteroscope was then gently inserted and the uterine cavity was distended using Normal saline .  General survey revealed a normal appearing endometrium with a solitary left anterior intracavitary fibroid.  Both tubal ostia were readily visualized.  A decision was made to excise the fibroid.  Myosure reach device was inserted and the fibroid was excised to the level of the base.  Adequate hemostasis was noted.  At this time, attention was turned to the ablation portion of the procedure.  The  hysteroscope was then removed and Novasure device gently inserted in standard fashion.  System was tested and passed.  System was then activated at 88 benson of power for 71 seconds.  The device was then removed without difficulty.  The hysteroscope was then gently reinserted and the cavity distended.  General survey revealed an endometrium appearance consistent with post-ablative changes.  No lesions or abnormalities were visualized.  All instruments were then removed from the uterus and vagina and good hemostasis was noted prior to the conclusion of the procedure.  All instrument and lap counts correct times two.  Pt was taken to Recovery room in stable condition.

## 2017-12-27 NOTE — SUBJECTIVE & OBJECTIVE
Obstetric History       T2      L2     SAB0   TAB0   Ectopic0   Multiple0   Live Births0       # Outcome Date GA Lbr Shahram/2nd Weight Sex Delivery Anes PTL Lv   4 Term            3 Term            2 Para      Vag-Spont      1 Para      Vag-Spont           Past Medical History:   Diagnosis Date    Acne     Allergic rhinitis     Anemia     Asthma     Herpes simplex type II infection     Multinodular goiter      Past Surgical History:   Procedure Laterality Date    EVACUATION EPIDURAL HEMATOMA      following thyroidectomy    FNA thyroid nodule  2014    HERNIA REPAIR      umbilical     THYROIDECTOMY, PARTIAL  2015       PTA Medications   Medication Sig    drospirenone-ethinyl estradiol (VESTURA, 28,) 3-0.02 mg per tablet Take 1 tablet by mouth once daily.    multivitamin-Ca-iron-minerals Tab Take by mouth once daily.    albuterol (PROAIR HFA) 90 mcg/actuation inhaler Inhale 2 puffs into the lungs every 6 (six) hours as needed.       Review of patient's allergies indicates:  No Known Allergies     Family History     Problem Relation (Age of Onset)    Allergic rhinitis Sister    Asthma Sister    Cancer Other    Diabetes Maternal Grandfather    Hypertension Mother, Maternal Grandmother    Thyroid disease Mother        Social History Main Topics    Smoking status: Never Smoker    Smokeless tobacco: Never Used    Alcohol use Yes      Comment: Occasionally  No alcohol 72 h prior to sx    Drug use: No    Sexual activity: Not Currently     Birth control/ protection: OCP     Review of Systems   Constitutional: Positive for fatigue. Negative for activity change, appetite change, fever and unexpected weight change.   Respiratory: Negative for shortness of breath.    Cardiovascular: Negative for chest pain, palpitations and leg swelling.   Gastrointestinal: Negative for abdominal pain, constipation, diarrhea, nausea and vomiting.   Genitourinary: Positive for menorrhagia, menstrual  problem and dysmenorrhea. Negative for dyspareunia, dysuria, flank pain, frequency, genital sores, hematuria, vaginal bleeding, vaginal discharge, vaginal pain, urinary incontinence and vaginal odor.   Musculoskeletal: Negative for back pain.   Neurological: Negative for syncope and headaches.      Objective:     Vital Signs (Most Recent):  Temp: 99.5 °F (37.5 °C) (12/27/17 0753)  Pulse: 75 (12/27/17 0753)  Resp: 12 (12/27/17 0753)  BP: 116/72 (12/27/17 0753)  SpO2: 100 % (12/27/17 0753) Vital Signs (24h Range):  Temp:  [99.5 °F (37.5 °C)] 99.5 °F (37.5 °C)  Pulse:  [75] 75  Resp:  [12] 12  SpO2:  [100 %] 100 %  BP: (116)/(72) 116/72     Weight: 72.9 kg (160 lb 11.5 oz)  Body mass index is 25.94 kg/m².    No LMP recorded.    Physical Exam:   Constitutional: She is oriented to person, place, and time. She appears well-developed and well-nourished. No distress.    HENT:   Head: Normocephalic and atraumatic.    Eyes: EOM are normal. Pupils are equal, round, and reactive to light.    Neck: Normal range of motion. Neck supple.    Cardiovascular: Normal rate, regular rhythm and normal heart sounds.     Pulmonary/Chest: Effort normal and breath sounds normal.        Abdominal: Soft. Bowel sounds are normal. She exhibits no distension. There is no tenderness.             Musculoskeletal: Normal range of motion and moves all extremeties. She exhibits no edema or tenderness.       Neurological: She is alert and oriented to person, place, and time.    Skin: Skin is warm and dry.    Psychiatric: She has a normal mood and affect. Her behavior is normal. Thought content normal.       Laboratory:  I have personallly reviewed all pertinent lab results from the last 24 hours.    Diagnostic Results:  Labs: Reviewed  US: Reviewed  Pap and Pathology reviewed

## 2018-01-25 ENCOUNTER — OFFICE VISIT (OUTPATIENT)
Dept: OBSTETRICS AND GYNECOLOGY | Facility: CLINIC | Age: 48
End: 2018-01-25
Payer: COMMERCIAL

## 2018-01-25 VITALS
SYSTOLIC BLOOD PRESSURE: 122 MMHG | DIASTOLIC BLOOD PRESSURE: 78 MMHG | HEIGHT: 66 IN | WEIGHT: 159.19 LBS | BODY MASS INDEX: 25.58 KG/M2

## 2018-01-25 DIAGNOSIS — Z98.890 STATUS POST HYSTEROSCOPIC MYOMECTOMY: ICD-10-CM

## 2018-01-25 DIAGNOSIS — Z98.890 STATUS POST HYSTEROSCOPIC ABLATION OF ENDOMETRIUM: Primary | ICD-10-CM

## 2018-01-25 PROCEDURE — 99999 PR PBB SHADOW E&M-EST. PATIENT-LVL II: CPT | Mod: PBBFAC,,, | Performed by: OBSTETRICS & GYNECOLOGY

## 2018-01-25 PROCEDURE — 99024 POSTOP FOLLOW-UP VISIT: CPT | Mod: S$GLB,,, | Performed by: OBSTETRICS & GYNECOLOGY

## 2018-01-25 NOTE — PROGRESS NOTES
Subjective:       Patient ID: Liana Keane is a 47 y.o. female.    Chief Complaint:  Post-op Evaluation      History of Present Illness  HPI  Pt is s/p HSC, Myosure Myomectomy, Novasure Ablation on 17.  Pt is here for scheduled post-op visit.  Reports complaints of lower abdominal cramping and bloating, but is overall doing well.  No bleeding since surgery.  Happy with results.    GYN & OB History  No LMP recorded. Patient has had an ablation.   Date of Last Pap: 2015    OB History    Para Term  AB Living   4 4 2     2   SAB TAB Ectopic Multiple Live Births                  # Outcome Date GA Lbr Shahram/2nd Weight Sex Delivery Anes PTL Lv   4 Term            3 Term            2 Para      Vag-Spont      1 Para      Vag-Spont             Review of Systems  Review of Systems   Constitutional: Negative for activity change, appetite change, fatigue, fever and unexpected weight change.   Respiratory: Negative for shortness of breath.    Cardiovascular: Negative for chest pain, palpitations and leg swelling.   Gastrointestinal: Positive for bloating. Negative for abdominal pain, blood in stool, constipation, diarrhea, nausea and vomiting.   Genitourinary: Negative for dysuria, flank pain, frequency, genital sores, hematuria, pelvic pain, vaginal bleeding, vaginal discharge, vaginal pain, urinary incontinence and vaginal odor.   Musculoskeletal: Negative for back pain.   Neurological: Negative for syncope and headaches.           Objective:    Physical Exam:   Constitutional: She is oriented to person, place, and time. She appears well-developed and well-nourished. No distress.       Cardiovascular: Normal rate and regular rhythm.     Pulmonary/Chest: Effort normal.        Abdominal: Soft. Bowel sounds are normal. She exhibits no distension. There is no tenderness.     Genitourinary: Vagina normal and uterus normal. Pelvic exam was performed with patient supine. There is no rash, tenderness,  lesion or injury on the right labia. There is no rash, tenderness, lesion or injury on the left labia. Uterus is not deviated, not enlarged and not tender. Cervix is normal. Right adnexum displays no mass, no tenderness and no fullness. Left adnexum displays no mass, no tenderness and no fullness. No erythema, tenderness or bleeding in the vagina. No foreign body in the vagina. No signs of injury around the vagina. No vaginal discharge found. Cervix exhibits no motion tenderness, no discharge and no friability.           Musculoskeletal: Normal range of motion and moves all extremeties. She exhibits no edema or tenderness.       Neurological: She is alert and oriented to person, place, and time.    Skin: Skin is warm and dry.    Psychiatric: Her behavior is normal. Thought content normal.          Assessment:        1. Status post hysteroscopic ablation of endometrium    2. Status post hysteroscopic myomectomy             Plan:      Status post hysteroscopic ablation of endometrium  -    Pt doing well.  Pathology benign.  Cleared to return to all routine duties.    Status post hysteroscopic myomectomy      Follow-up for Annual exam.

## 2018-02-27 ENCOUNTER — TELEPHONE (OUTPATIENT)
Dept: OBSTETRICS AND GYNECOLOGY | Facility: CLINIC | Age: 48
End: 2018-02-27

## 2018-02-27 NOTE — TELEPHONE ENCOUNTER
Patient stated that she had an ablation 12/27/2017, for the past week she has noticed hot flashes and they are getting progressively worse.  She wanted to know if there is anything she can take to help.  Please advise

## 2018-02-27 NOTE — TELEPHONE ENCOUNTER
The ablation surgery will not cause or lead to hot flashes.  However, the pt is in the right age for the beginning or perimenopausal changes which can include hot flashes.  I recommend that she make an appointment for evaluation and for discussion of her treatment options.  I do not recommend starting anything at this time.

## 2018-02-27 NOTE — TELEPHONE ENCOUNTER
----- Message from Ayana Suresh sent at 2/27/2018  3:09 PM CST -----  Contact: pt  Pt states she's return a call, she can be reached at  [phone] or cell [phone] Thanks

## 2018-02-27 NOTE — TELEPHONE ENCOUNTER
----- Message from Ria Stafford sent at 2/27/2018  9:40 AM CST -----  Contact: pt  Calling in regards to hot flashes and have questions and health concerns and please advise [phone]

## 2018-02-27 NOTE — TELEPHONE ENCOUNTER
Patient informed of Dr Francisco recommendations.  She voiced understanding and will call back to schedule an appointment for evaluation.

## 2018-08-14 ENCOUNTER — LAB VISIT (OUTPATIENT)
Dept: LAB | Facility: HOSPITAL | Age: 48
End: 2018-08-14
Attending: FAMILY MEDICINE
Payer: COMMERCIAL

## 2018-08-14 ENCOUNTER — OFFICE VISIT (OUTPATIENT)
Dept: INTERNAL MEDICINE | Facility: CLINIC | Age: 48
End: 2018-08-14
Payer: COMMERCIAL

## 2018-08-14 VITALS
HEIGHT: 66 IN | WEIGHT: 175.25 LBS | BODY MASS INDEX: 28.16 KG/M2 | DIASTOLIC BLOOD PRESSURE: 66 MMHG | HEART RATE: 97 BPM | TEMPERATURE: 98 F | OXYGEN SATURATION: 98 % | SYSTOLIC BLOOD PRESSURE: 118 MMHG

## 2018-08-14 DIAGNOSIS — R10.11 COLICKY RIGHT UPPER QUADRANT PAIN: ICD-10-CM

## 2018-08-14 DIAGNOSIS — R10.11 COLICKY RIGHT UPPER QUADRANT PAIN: Primary | ICD-10-CM

## 2018-08-14 DIAGNOSIS — R10.9 FLANK PAIN: ICD-10-CM

## 2018-08-14 PROCEDURE — 99214 OFFICE O/P EST MOD 30 MIN: CPT | Mod: S$GLB,,, | Performed by: FAMILY MEDICINE

## 2018-08-14 PROCEDURE — 85025 COMPLETE CBC W/AUTO DIFF WBC: CPT

## 2018-08-14 PROCEDURE — 3008F BODY MASS INDEX DOCD: CPT | Mod: CPTII,S$GLB,, | Performed by: FAMILY MEDICINE

## 2018-08-14 PROCEDURE — 99999 PR PBB SHADOW E&M-EST. PATIENT-LVL IV: CPT | Mod: PBBFAC,,, | Performed by: FAMILY MEDICINE

## 2018-08-14 PROCEDURE — 36415 COLL VENOUS BLD VENIPUNCTURE: CPT | Mod: PO

## 2018-08-14 PROCEDURE — 80053 COMPREHEN METABOLIC PANEL: CPT

## 2018-08-14 RX ORDER — NAPROXEN 500 MG/1
500 TABLET ORAL 2 TIMES DAILY PRN
Qty: 20 TABLET | Refills: 1 | Status: SHIPPED | OUTPATIENT
Start: 2018-08-14 | End: 2018-10-24

## 2018-08-14 RX ORDER — ALBUTEROL SULFATE 0.83 MG/ML
2.5 SOLUTION RESPIRATORY (INHALATION)
COMMUNITY
End: 2018-08-14

## 2018-08-14 NOTE — ASSESSMENT & PLAN NOTE
This problem is NEW TO ME. This problem REQUIRES FURTHER WORK-UP. ONSET was mid-late morning, earlier today. QUALITY described as a sharp, stabbing, and aching pain. LOCATION is right upper quadrant/right flank. TIMING described as intermittent. SEVERITY described as MODERATELY SEVERE when it occurs. Cannot identify EXACERBATING or ALLEVIATING FACTORS. She reports no dysuria, hematuria, fever, or change in bowel habits. She does not have menstrual cycle due to endometrial ablation. We discussed differential diagnosis. I explained to her that I was uncertain of the cause of her pain but I suspected it was kidney stones. It was agreed to proceed with treatment as ordered.

## 2018-08-14 NOTE — PROGRESS NOTES
CHIEF COMPLAINT  Abdominal Pain    HISTORY OF PRESENT ILLNESS    Problem List Items Addressed This Visit        GI    Colicky right upper quadrant pain - Primary    Current Assessment & Plan     This problem is NEW TO ME. This problem REQUIRES FURTHER WORK-UP. ONSET was mid-late morning, earlier today. QUALITY described as a sharp, stabbing, and aching pain. LOCATION is right upper quadrant/right flank. TIMING described as intermittent. SEVERITY described as MODERATELY SEVERE when it occurs. Cannot identify EXACERBATING or ALLEVIATING FACTORS. She reports no dysuria, hematuria, fever, or change in bowel habits. She does not have menstrual cycle due to endometrial ablation. We discussed differential diagnosis. I explained to her that I was uncertain of the cause of her pain but I suspected it was kidney stones. It was agreed to proceed with treatment as ordered.         Relevant Medications    naproxen (NAPROSYN) 500 MG tablet    Other Relevant Orders    CBC auto differential    Comprehensive metabolic panel    CT Renal Stone Study ABD Pelvis WO    URINALYSIS    Flank pain    Relevant Medications    naproxen (NAPROSYN) 500 MG tablet    Other Relevant Orders    CBC auto differential    Comprehensive metabolic panel    CT Renal Stone Study ABD Pelvis WO    URINALYSIS          PAST MEDICAL HISTORY, FAMILY HISTORY and SOCIAL HISTORY reviewed by me (LATISHA Simmons MD) and are updated consistent with the patient's report.    CURRENT MEDICATION LIST, and ALLERGY LIST reviewed by me (LATISHA Simmons MD) and are updated consistent with the patient's report as follows:    REVIEW OF SYSTEMS  GASTROINTESTINAL: No hematemesis or melena reported. Last BM was this morning and was normal.  GENITOURINARY: No dysuria or hematuria reported.   CONSTITUTIONAL: No fever or chills reported.  CARDIOVASCULAR: No angina or orthopnea reported.   PULMONARY: No hemoptysis or trouble breathing reported.     PHYSICAL EXAM  Vitals:     "08/14/18 1530   BP: 118/66   BP Location: Right arm   Patient Position: Sitting   BP Method: Medium (Manual)   Pulse: 97   Temp: 97.5 °F (36.4 °C)   TempSrc: Tympanic   SpO2: 98%   Weight: 79.5 kg (175 lb 4.3 oz)   Height: 5' 6" (1.676 m)   CONSTITUTIONAL: Vital signs noted. No apparent distress. Does not appear acutely ill or septic. Appears adequately hydrated.  EYE: Sclerae anicteric. Lids and conjunctiva unremarkable.  ENT: External ENT unremarkable. Oropharynx moist.  NECK: Trachea midline. Thyroid nontender.  PULM: Lungs clear. Breathing unlabored.  CV: Auscultation reveals regular rate and rhythm without murmur, gallop or rub. No carotid bruit.  ABDOMEN: Abdomen is soft. Except for MINIMAL right CVA tenderness, abdomen is otherwise nontender. Bowel sounds normal. No appreciable organomegaly or abdominal mass on palpation. Abdominal aorta nonpalpable. No abdominal bruit.  DERM: Skin warm and moist with normal turgor.  NEURO: There are no gross focal motor deficits or gross deficits of cranial nerves III-XII.  PSYCH: Alert and oriented x 3. Mood is grossly neutral. Affect appropriate. Judgment and insight not grossly compromised.  MSK: Grossly normal stance and gait.     ASSESSMENT and PLAN  Colicky right upper quadrant pain  -     CBC auto differential; Future; Expected date: 08/14/2018  -     Cancel: URINALYSIS  -     Comprehensive metabolic panel; Future; Expected date: 08/14/2018  -     CT Renal Stone Study ABD Pelvis WO; Future; Expected date: 08/14/2018  -     naproxen (NAPROSYN) 500 MG tablet; Take 1 tablet (500 mg total) by mouth 2 (two) times daily as needed (pain).  Dispense: 20 tablet; Refill: 1  -     URINALYSIS; Future; Expected date: 08/14/2018    Flank pain  -     CBC auto differential; Future; Expected date: 08/14/2018  -     Cancel: URINALYSIS  -     Comprehensive metabolic panel; Future; Expected date: 08/14/2018  -     CT Renal Stone Study ABD Pelvis WO; Future; Expected date: 08/14/2018  -   "   naproxen (NAPROSYN) 500 MG tablet; Take 1 tablet (500 mg total) by mouth 2 (two) times daily as needed (pain).  Dispense: 20 tablet; Refill: 1  -     URINALYSIS; Future; Expected date: 08/14/2018        PRESCRIPTION MEDICATION MANAGEMENT  Except as noted below, CURRENT MEDICATIONS are to remain unchanged from that listed above.  Medications Ordered This Encounter   Medications    naproxen (NAPROSYN) 500 MG tablet     Sig: Take 1 tablet (500 mg total) by mouth 2 (two) times daily as needed (pain).     Dispense:  20 tablet     Refill:  1     Medications Discontinued During This Encounter   Medication Reason    albuterol (PROVENTIL) 2.5 mg /3 mL (0.083 %) nebulizer solution Patient no longer taking       Follow-up in about 2 days (around 8/16/2018) for review test results and discuss treatment plan, re-evaluate problem(s) discussed today.    Patient Instructions     Unknown Causes of Abdominal Pain (Female)    The exact cause of your abdominal (stomach) pain is not clear. This does not mean that this is something to worry about. Everyone likes to know the exact cause of the problem, but sometimes with abdominal pain, there is no clear-cut cause, and this could be a good thing. The good news is that your symptoms can be treated, and you will feel better.   Your condition does not seem serious now; however, sometimes the signs of a serious problem may take more time to appear. For this reason, it is important for you to watch for any new symptoms, problems, or worsening of your condition.  Over the next few days, the abdominal pain may come and go, or be continuous. Other common symptoms can include nausea and vomiting. Sometimes it can be difficult to tell if you feel nauseous, you may just feel bad and not associate that feeling with nausea. Constipation, diarrhea, and a fever may go along with the pain.  The pain may continue even if treated correctly over the following days. Depending on how things go, sometimes  the cause can become clear and may require further or different treatment. Additional evaluations, medications, or tests may also be needed.  Home care  Your healthcare provider may prescribe medicine for pain, symptoms, or an infection.  Follow the healthcare provider's instructions for taking these medicines.  General care  · Rest as much as you can until your next exam. No strenuous activities.  · Try to find positions that ease discomfort. A small pillow placed on the abdomen may help relieve pain.  · Something warm on your abdomen (such as a heating pad) may help, but be careful not to burn yourself.  Diet  · Do not force yourself to eat, especially if having cramps, vomiting, or diarrhea.  · Water is important so you do not get dehydrated. Soup may also be good. Sports drinks may also help, especially if they are not too acidic. Make sure you don't drink sugary drinks as this can make things worse. Take liquids in small amounts. Do not guzzle them.  · Caffeine sometimes makes the pain and cramping worse.  · Avoid dairy products if you have vomiting or diarrhea.  · Don't eat large amounts at a time. Wait a few minutes between bites.  · Eat a diet low in fiber (called a low-residue diet). Foods allowed include refined breads, white rice, fruit and vegetable juices without pulp, tender meats. These foods will pass more easily through the intestine.  · Avoid whole-grain foods, whole fruits and vegetables, meats, seeds and nuts, fried or fatty foods, dairy, alcohol and spicy foods until your symptoms go away.  Follow-up care  Follow up with your healthcare provider, or as advised, if your pain does not begin to improve in the next 24 hours.  Call 911  Call 911 if any of these occur:  · Trouble breathing  · Confusion  · Fainting or loss of consciousness  · Rapid heart rate  · Seizure  When to seek medical advice  Call your healthcare provider right away if any of these occur:  · Pain gets worse or moves to the right  lower abdomen  · New or worsening vomiting or diarrhea  · Swelling of the abdomen  · Unable to pass stool for more than 3 days  · Fever of 100.4ºF (38ºC) or higher, or as directed by your healthcare provider.  · Blood in vomit or bowel movements (dark red or black color)  · Jaundice (yellow color of eyes and skin)  · Weakness, dizziness  · Chest, arm, back, neck or jaw pain  · Unexpected vaginal bleeding or missed period  · Can't keep down liquids or water and are getting dehydrated  Date Last Reviewed: 12/30/2015  © 8782-0651 [employer]. [address] [place] [address]. All rights reserved. This information is not intended as a substitute for professional medical care. Always follow your healthcare professional's instructions.        Flank Pain, Uncertain Cause  The flank is the area between your upper abdomen and your back. Pain there is often caused by a problem with your kidneys. It might be a kidney infection or a kidney stone. Other causes of flank pain include spinal arthritis, a pinched nerve from a back injury, or a back muscle strain or spasm.  The cause of your flank pain is not certain. You may need other tests.  Home care  Follow these tips when caring for yourself at home:  · You may use acetaminophen or ibuprofen to control pain, unless your health care provider prescribed another medicine. If you have chronic liver or kidney disease, talk with your provider before taking these medicines. Also talk with your provider first if youve ever had a stomach ulcer or GI bleeding.  · If the pain is coming from your muscles, you may get relief with ice or heat. During the first 2 days after the injury, put an ice pack on the painful area for 20 minutes every 2 to 4 hours. This will reduce swelling and pain. A hot shower, hot bath, or heating pad works well for a muscle spasm. You can start with ice, then switch to heat after 2 days. You might find that alternating ice and heat works  "well. Use the method that feels the best to you.  Follow-up care  Follow up with your healthcare provider if your symptoms dont get better over the next few days.  When to seek medical advice  Call your healthcare provider right away if any of these happen:  · Repeated vomiting  · Fever of 100.4ºF (38ºC) or higher, or as directed by your health care provider  · Flank pain that gets worse  · Pain that spreads to the front of your belly (abdomen)  · Dizziness, weakness, or fainting  · Blood in your urine  · Burning feeling when you urinate or the need to urinate often  · Pain in one of your legs that gets worse  · Numbness or weakness in a leg  Date Last Reviewed: 10/1/2016  © 6866-9676 [employer]. [address] [place] [address], [place] [address]. All rights reserved. This information is not intended as a substitute for professional medical care. Always follow your healthcare professional's instructions.            ABOUT THIS DOCUMENTATION:  · The order of the conditions listed in the HPI is one of convenience and does not necessarily reflect the chronology of the appointment, nor the relative importance of a condition.  · Documentation entered by me for this encounter was done in part using speech-recognition technology. Although I have made an effort to ensure accuracy, "sound like" errors may exist and should be interpreted in context.                        -LATISHA Simmons MD       "

## 2018-08-14 NOTE — PATIENT INSTRUCTIONS
Unknown Causes of Abdominal Pain (Female)    The exact cause of your abdominal (stomach) pain is not clear. This does not mean that this is something to worry about. Everyone likes to know the exact cause of the problem, but sometimes with abdominal pain, there is no clear-cut cause, and this could be a good thing. The good news is that your symptoms can be treated, and you will feel better.   Your condition does not seem serious now; however, sometimes the signs of a serious problem may take more time to appear. For this reason, it is important for you to watch for any new symptoms, problems, or worsening of your condition.  Over the next few days, the abdominal pain may come and go, or be continuous. Other common symptoms can include nausea and vomiting. Sometimes it can be difficult to tell if you feel nauseous, you may just feel bad and not associate that feeling with nausea. Constipation, diarrhea, and a fever may go along with the pain.  The pain may continue even if treated correctly over the following days. Depending on how things go, sometimes the cause can become clear and may require further or different treatment. Additional evaluations, medications, or tests may also be needed.  Home care  Your healthcare provider may prescribe medicine for pain, symptoms, or an infection.  Follow the healthcare provider's instructions for taking these medicines.  General care  · Rest as much as you can until your next exam. No strenuous activities.  · Try to find positions that ease discomfort. A small pillow placed on the abdomen may help relieve pain.  · Something warm on your abdomen (such as a heating pad) may help, but be careful not to burn yourself.  Diet  · Do not force yourself to eat, especially if having cramps, vomiting, or diarrhea.  · Water is important so you do not get dehydrated. Soup may also be good. Sports drinks may also help, especially if they are not too acidic. Make sure you don't drink  sugary drinks as this can make things worse. Take liquids in small amounts. Do not guzzle them.  · Caffeine sometimes makes the pain and cramping worse.  · Avoid dairy products if you have vomiting or diarrhea.  · Don't eat large amounts at a time. Wait a few minutes between bites.  · Eat a diet low in fiber (called a low-residue diet). Foods allowed include refined breads, white rice, fruit and vegetable juices without pulp, tender meats. These foods will pass more easily through the intestine.  · Avoid whole-grain foods, whole fruits and vegetables, meats, seeds and nuts, fried or fatty foods, dairy, alcohol and spicy foods until your symptoms go away.  Follow-up care  Follow up with your healthcare provider, or as advised, if your pain does not begin to improve in the next 24 hours.  Call 911  Call 911 if any of these occur:  · Trouble breathing  · Confusion  · Fainting or loss of consciousness  · Rapid heart rate  · Seizure  When to seek medical advice  Call your healthcare provider right away if any of these occur:  · Pain gets worse or moves to the right lower abdomen  · New or worsening vomiting or diarrhea  · Swelling of the abdomen  · Unable to pass stool for more than 3 days  · Fever of 100.4ºF (38ºC) or higher, or as directed by your healthcare provider.  · Blood in vomit or bowel movements (dark red or black color)  · Jaundice (yellow color of eyes and skin)  · Weakness, dizziness  · Chest, arm, back, neck or jaw pain  · Unexpected vaginal bleeding or missed period  · Can't keep down liquids or water and are getting dehydrated  Date Last Reviewed: 12/30/2015  © 8825-7365 [employer]. [address] [place] [address], [place] [address]. All rights reserved. This information is not intended as a substitute for professional medical care. Always follow your healthcare professional's instructions.        Flank Pain, Uncertain Cause  The flank is the area between your upper abdomen and your back. Pain  there is often caused by a problem with your kidneys. It might be a kidney infection or a kidney stone. Other causes of flank pain include spinal arthritis, a pinched nerve from a back injury, or a back muscle strain or spasm.  The cause of your flank pain is not certain. You may need other tests.  Home care  Follow these tips when caring for yourself at home:  · You may use acetaminophen or ibuprofen to control pain, unless your health care provider prescribed another medicine. If you have chronic liver or kidney disease, talk with your provider before taking these medicines. Also talk with your provider first if youve ever had a stomach ulcer or GI bleeding.  · If the pain is coming from your muscles, you may get relief with ice or heat. During the first 2 days after the injury, put an ice pack on the painful area for 20 minutes every 2 to 4 hours. This will reduce swelling and pain. A hot shower, hot bath, or heating pad works well for a muscle spasm. You can start with ice, then switch to heat after 2 days. You might find that alternating ice and heat works well. Use the method that feels the best to you.  Follow-up care  Follow up with your healthcare provider if your symptoms dont get better over the next few days.  When to seek medical advice  Call your healthcare provider right away if any of these happen:  · Repeated vomiting  · Fever of 100.4ºF (38ºC) or higher, or as directed by your health care provider  · Flank pain that gets worse  · Pain that spreads to the front of your belly (abdomen)  · Dizziness, weakness, or fainting  · Blood in your urine  · Burning feeling when you urinate or the need to urinate often  · Pain in one of your legs that gets worse  · Numbness or weakness in a leg  Date Last Reviewed: 10/1/2016  © 1057-6756 [employer]. [address] [place] [address], [place] [address]. All rights reserved. This information is not intended as a substitute for professional medical care.  Always follow your healthcare professional's instructions.

## 2018-08-15 ENCOUNTER — HOSPITAL ENCOUNTER (OUTPATIENT)
Dept: RADIOLOGY | Facility: HOSPITAL | Age: 48
Discharge: HOME | End: 2018-08-15
Attending: FAMILY MEDICINE
Payer: COMMERCIAL

## 2018-08-15 DIAGNOSIS — R10.11 COLICKY RIGHT UPPER QUADRANT PAIN: ICD-10-CM

## 2018-08-15 DIAGNOSIS — R10.9 FLANK PAIN: ICD-10-CM

## 2018-08-15 LAB
ALBUMIN SERPL BCP-MCNC: 3.6 G/DL
ALP SERPL-CCNC: 38 U/L
ALT SERPL W/O P-5'-P-CCNC: 12 U/L
ANION GAP SERPL CALC-SCNC: 10 MMOL/L
AST SERPL-CCNC: 20 U/L
BASOPHILS # BLD AUTO: 0.04 K/UL
BASOPHILS NFR BLD: 0.9 %
BILIRUB SERPL-MCNC: 0.3 MG/DL
BUN SERPL-MCNC: 13 MG/DL
CALCIUM SERPL-MCNC: 9 MG/DL
CHLORIDE SERPL-SCNC: 105 MMOL/L
CO2 SERPL-SCNC: 21 MMOL/L
CREAT SERPL-MCNC: 1 MG/DL
DIFFERENTIAL METHOD: ABNORMAL
EOSINOPHIL # BLD AUTO: 0.2 K/UL
EOSINOPHIL NFR BLD: 4.5 %
ERYTHROCYTE [DISTWIDTH] IN BLOOD BY AUTOMATED COUNT: 12.3 %
EST. GFR  (AFRICAN AMERICAN): >60 ML/MIN/1.73 M^2
EST. GFR  (NON AFRICAN AMERICAN): >60 ML/MIN/1.73 M^2
GLUCOSE SERPL-MCNC: 86 MG/DL
HCT VFR BLD AUTO: 38.4 %
HGB BLD-MCNC: 12.2 G/DL
IMM GRANULOCYTES # BLD AUTO: 0.05 K/UL
IMM GRANULOCYTES NFR BLD AUTO: 1.1 %
LYMPHOCYTES # BLD AUTO: 1.9 K/UL
LYMPHOCYTES NFR BLD: 40.8 %
MCH RBC QN AUTO: 30.2 PG
MCHC RBC AUTO-ENTMCNC: 31.8 G/DL
MCV RBC AUTO: 95 FL
MONOCYTES # BLD AUTO: 0.4 K/UL
MONOCYTES NFR BLD: 9.2 %
NEUTROPHILS # BLD AUTO: 2 K/UL
NEUTROPHILS NFR BLD: 43.5 %
NRBC BLD-RTO: 0 /100 WBC
PLATELET # BLD AUTO: 323 K/UL
PMV BLD AUTO: 11.5 FL
POTASSIUM SERPL-SCNC: 4.3 MMOL/L
PROT SERPL-MCNC: 7.7 G/DL
RBC # BLD AUTO: 4.04 M/UL
SODIUM SERPL-SCNC: 136 MMOL/L
WBC # BLD AUTO: 4.66 K/UL

## 2018-08-15 PROCEDURE — 74176 CT ABD & PELVIS W/O CONTRAST: CPT | Mod: TC,PO

## 2018-08-15 PROCEDURE — 74176 CT ABD & PELVIS W/O CONTRAST: CPT | Mod: 26,,, | Performed by: RADIOLOGY

## 2018-08-17 ENCOUNTER — OFFICE VISIT (OUTPATIENT)
Dept: INTERNAL MEDICINE | Facility: CLINIC | Age: 48
End: 2018-08-17
Payer: COMMERCIAL

## 2018-08-17 VITALS
BODY MASS INDEX: 28.21 KG/M2 | TEMPERATURE: 97 F | DIASTOLIC BLOOD PRESSURE: 82 MMHG | SYSTOLIC BLOOD PRESSURE: 128 MMHG | WEIGHT: 175.5 LBS | HEART RATE: 97 BPM | OXYGEN SATURATION: 100 % | HEIGHT: 66 IN

## 2018-08-17 DIAGNOSIS — R10.11 COLICKY RIGHT UPPER QUADRANT PAIN: ICD-10-CM

## 2018-08-17 DIAGNOSIS — R10.9 FLANK PAIN: ICD-10-CM

## 2018-08-17 DIAGNOSIS — M62.830 MUSCLE SPASM OF BACK: ICD-10-CM

## 2018-08-17 DIAGNOSIS — M54.50 ACUTE RIGHT-SIDED LOW BACK PAIN WITHOUT SCIATICA: Primary | ICD-10-CM

## 2018-08-17 PROCEDURE — 99999 PR PBB SHADOW E&M-EST. PATIENT-LVL IV: CPT | Mod: PBBFAC,,, | Performed by: FAMILY MEDICINE

## 2018-08-17 PROCEDURE — 3008F BODY MASS INDEX DOCD: CPT | Mod: CPTII,S$GLB,, | Performed by: FAMILY MEDICINE

## 2018-08-17 PROCEDURE — 99214 OFFICE O/P EST MOD 30 MIN: CPT | Mod: S$GLB,,, | Performed by: FAMILY MEDICINE

## 2018-08-17 RX ORDER — CETIRIZINE HYDROCHLORIDE 10 MG/1
TABLET ORAL
COMMUNITY
Start: 2012-05-04 | End: 2018-10-24

## 2018-08-17 NOTE — PROGRESS NOTES
"CHIEF COMPLAINT  Results      HISTORY OF PRESENT ILLNESSO    She returns for reevaluation of pain (ONSET mid-late morning August 14, 2018) described at her previous appointment. I reviewed the results of her CT scan (unremarkable, other than findings suggesting MILD constipation) and lab results (unremarkable, with MILD nonspecific abnormalities). Today she describes the LOCATION as more so in the right upper lumbar region with radiation laterally. QUALITY described as a sharp, stabbing, and aching pain. TIMING described as intermittent, waxing and waning, occurring exclusively during the daytime, predominantly during working hours. SEVERITY described as MODERATELY SEVERE when it occurs. Today she identifies bending and lifting as EXACERBATING FACTORS. She identifies naproxen as an ALLEVIATING FACTOR. She says, "I get home after work and take the naproxen and go to bed and don't have any pain and sleep well all night." I explained to her that based on her test results, her history, and her physical exam today, her pain sounds musculoskeletal in origin. I taught her back stretching exercises and instructed her to perform them for 3 minutes, 3 times a day. Additionally, she is to continue taking the naproxen as directed as needed, and I've given her a referral for physical therapy. I told her that if I did not hear from her, I would assume that she was doing well and getting better as expected, and she agreed to let me know if this were not the case. No other complaints or concerns reported.    Problem List Items Addressed This Visit        GI    Colicky right upper quadrant pain    Flank pain       Orthopedic    Acute right-sided low back pain without sciatica - Primary    Relevant Orders    Ambulatory Referral to Physical/Occupational Therapy    Muscle spasm of back    Relevant Orders    Ambulatory Referral to Physical/Occupational Therapy          PAST MEDICAL HISTORY, FAMILY HISTORY and SOCIAL HISTORY reviewed by " "me (LATISHA Simmons MD) and are updated consistent with the patient's report.    CURRENT MEDICATION LIST, and ALLERGY LIST reviewed by me (LATISHA Simmons MD) and are updated consistent with the patient's report as follows:    REVIEW OF SYSTEMS  CONSTITUTIONAL: No fever, sweats, or involuntary weight loss reported.  : No change in urinary habits reported.  GI: No change in bowel habits reported.  NEURO: No saddle anesthesia or loss of lower extremity strength or sensation reported.     PHYSICAL EXAM  Vitals:    08/17/18 0723   BP: 128/82   BP Location: Right arm   Patient Position: Sitting   BP Method: Medium (Manual)   Pulse: 97   Temp: 96.9 °F (36.1 °C)   TempSrc: Tympanic   SpO2: 100%   Weight: 79.6 kg (175 lb 7.8 oz)   Height: 5' 6" (1.676 m)   CONSTITUTIONAL: Vital signs noted. No apparent distress. Does not appear acutely ill or septic. Appears adequately hydrated.  PULM: Breathing unlabored.  HEART: Regular.  ABDOMEN: Soft and nontender. No flank or CVA tenderness. Bowel sounds present.   DERM: Skin normothermic with normal turgor.  NEURO: There are no gross focal motor deficits or gross deficits of cranial nerves III-XII.  PSYCHIATRIC: Alert and oriented x 3. Mood is grossly neutral. Affect appropriate. Judgment and insight not grossly compromised.  MUSCULOSKELETAL: Grossly normal stance and gait. Thoracolumbar spine palpates normally and reasonably symmetric. She has mild left lumbar paraspinal muscle spasm and moderate to moderately severe right lumbar paraspinal muscle spasm noted, but no tenderness. LE strength is normal.     ASSESSMENT and PLAN  Acute right-sided low back pain without sciatica  -     Ambulatory Referral to Physical/Occupational Therapy    Flank pain    Colicky right upper quadrant pain    Muscle spasm of back  -     Ambulatory Referral to Physical/Occupational Therapy        PRESCRIPTION MEDICATION MANAGEMENT  Except as noted below, CURRENT MEDICATIONS are to remain unchanged " "from that listed above.     There are no discontinued medications.    Follow-up if symptoms worsen or fail to improve.    There are no Patient Instructions on file for this visit.    ABOUT THIS DOCUMENTATION:  · The order of the conditions listed in the HPI is one of convenience and does not necessarily reflect the chronology of the appointment, nor the relative importance of a condition.  · Documentation entered by me for this encounter was done in part using speech-recognition technology. Although I have made an effort to ensure accuracy, "sound like" errors may exist and should be interpreted in context.                        -LATISHA Simmons MD       "

## 2018-10-24 ENCOUNTER — OFFICE VISIT (OUTPATIENT)
Dept: OBSTETRICS AND GYNECOLOGY | Facility: CLINIC | Age: 48
End: 2018-10-24
Payer: COMMERCIAL

## 2018-10-24 VITALS
BODY MASS INDEX: 28.14 KG/M2 | HEIGHT: 66 IN | SYSTOLIC BLOOD PRESSURE: 114 MMHG | WEIGHT: 175.06 LBS | DIASTOLIC BLOOD PRESSURE: 80 MMHG

## 2018-10-24 DIAGNOSIS — N92.0 MENORRHAGIA WITH REGULAR CYCLE: ICD-10-CM

## 2018-10-24 DIAGNOSIS — Z01.419 WELL WOMAN EXAM WITH ROUTINE GYNECOLOGICAL EXAM: Primary | ICD-10-CM

## 2018-10-24 PROCEDURE — 99999 PR PBB SHADOW E&M-EST. PATIENT-LVL III: CPT | Mod: PBBFAC,,, | Performed by: OBSTETRICS & GYNECOLOGY

## 2018-10-24 PROCEDURE — 88175 CYTOPATH C/V AUTO FLUID REDO: CPT

## 2018-10-24 PROCEDURE — 99396 PREV VISIT EST AGE 40-64: CPT | Mod: S$GLB,,, | Performed by: OBSTETRICS & GYNECOLOGY

## 2018-10-24 PROCEDURE — 87624 HPV HI-RISK TYP POOLED RSLT: CPT

## 2018-10-24 NOTE — PROGRESS NOTES
Subjective:       Patient ID: Liana Keane is a 48 y.o. female.    Chief Complaint:  Annual Exam      History of Present Illness  HPI  Annual Exam-Premenopausal  Patient presents for annual exam. The patient has no complaints today. The patient is sexually active. GYN screening history: last pap: approximate date  and was normal and last mammogram: approximate date 2017 and was normal. The patient wears seatbelts: yes. The patient participates in regular exercise: no. Has the patient ever been transfused or tattooed?: no. The patient reports that there is not domestic violence in her life.  Pt reports that her periods have started again last month with a period that lasted 5 days.  Otherwise has been happy with results of ablation surgery last year.  Has been doing well with OCP and desires refill.      GYN & OB History  Patient's last menstrual period was 2018.   Date of Last Pap: 2015    OB History    Para Term  AB Living   4 2 2   2 2   SAB TAB Ectopic Multiple Live Births   1 1     1      # Outcome Date GA Lbr Shahram/2nd Weight Sex Delivery Anes PTL Lv   4 TAB            3 SAB            2 Term     M Vag-Spont      1 Term     M Vag-Spont   BALDOMERO          Review of Systems  Review of Systems   Constitutional: Negative for activity change, appetite change, fatigue, fever and unexpected weight change.   Respiratory: Negative for shortness of breath.    Cardiovascular: Negative for chest pain, palpitations and leg swelling.   Gastrointestinal: Negative for abdominal pain, bloating, blood in stool, constipation, diarrhea, nausea and vomiting.   Genitourinary: Negative for dyspareunia, dysuria, flank pain, frequency, genital sores, hematuria, menorrhagia, menstrual problem, pelvic pain, urgency, vaginal bleeding, vaginal discharge, vaginal pain, dysmenorrhea, urinary incontinence and vaginal odor.   Musculoskeletal: Negative for back pain.   Neurological: Negative for syncope and  headaches.   Breast: Negative for breast mass, breast pain, nipple discharge and skin changes          Objective:    Physical Exam:   Constitutional: She is oriented to person, place, and time. She appears well-developed and well-nourished. No distress.    HENT:   Head: Normocephalic and atraumatic.    Eyes: EOM are normal. Pupils are equal, round, and reactive to light.    Neck: Normal range of motion.    Cardiovascular: Normal rate, regular rhythm and normal heart sounds.     Pulmonary/Chest: Effort normal and breath sounds normal. Right breast exhibits no inverted nipple, no mass, no nipple discharge, no skin change, no tenderness, no bleeding and no swelling. Left breast exhibits no inverted nipple, no mass, no nipple discharge, no skin change, no tenderness, no bleeding and no swelling. Breasts are symmetrical.        Abdominal: Soft. Bowel sounds are normal. She exhibits no distension. There is no tenderness.     Genitourinary: Vagina normal and uterus normal. Pelvic exam was performed with patient supine. There is no rash, tenderness, lesion or injury on the right labia. There is no rash, tenderness, lesion or injury on the left labia. Uterus is not deviated, not enlarged and not tender. Cervix is normal. Right adnexum displays no mass, no tenderness and no fullness. Left adnexum displays no mass, no tenderness and no fullness. No erythema, tenderness or bleeding in the vagina. No foreign body in the vagina. No signs of injury around the vagina. No vaginal discharge found. Cervix exhibits no motion tenderness, no discharge and no friability. Additional cervical findings: pap smear done          Musculoskeletal: Normal range of motion and moves all extremeties. She exhibits no edema or tenderness.       Neurological: She is alert and oriented to person, place, and time.    Skin: Skin is warm and dry.    Psychiatric: She has a normal mood and affect. Her behavior is normal. Thought content normal.           Assessment:        1. Well woman exam with routine gynecological exam    2. Menorrhagia with regular cycle             Plan:      Well woman exam with routine gynecological exam  -     Liquid-based pap smear, screening  -     HPV High Risk Genotypes, PCR  -     Mammo Digital Screening Bilat; Future; Expected date: 10/24/2018  -     Pt was counseled on cervical/vaginal screening guidelines and recommendations.  Last pap NILM on 2015.  If today's pap smear result is negative, next pap smear will be due in 2023.  -     Pt was advised on current breast cancer screening recommendations.  Pt desires to proceed with breast exam today and screening MMG.  -     Follow up with PCP for routine health maintenance needs.    Menorrhagia with regular cycle  -     GIANVI, 28, 3-0.02 mg per tablet; Take 1 tablet by mouth once daily.  Dispense: 28 tablet; Refill: 3  -     Pt with good response to OCP and ablation/myomectomy last year.  Periods have restarted but are OK.  Pt will monitor progress of symptoms and will continue with OCP.      Follow-up in about 1 year (around 10/24/2019).

## 2018-10-30 LAB
HPV HR 12 DNA CVX QL NAA+PROBE: NEGATIVE
HPV16 AG SPEC QL: NEGATIVE
HPV18 DNA SPEC QL NAA+PROBE: NEGATIVE

## 2018-11-12 ENCOUNTER — HOSPITAL ENCOUNTER (OUTPATIENT)
Dept: RADIOLOGY | Facility: HOSPITAL | Age: 48
Discharge: HOME | End: 2018-11-12
Attending: OBSTETRICS & GYNECOLOGY
Payer: COMMERCIAL

## 2018-11-12 ENCOUNTER — OFFICE VISIT (OUTPATIENT)
Dept: FAMILY MEDICINE | Facility: CLINIC | Age: 48
End: 2018-11-12
Payer: COMMERCIAL

## 2018-11-12 VITALS
SYSTOLIC BLOOD PRESSURE: 112 MMHG | HEART RATE: 102 BPM | HEIGHT: 64 IN | WEIGHT: 168.88 LBS | TEMPERATURE: 99 F | BODY MASS INDEX: 28.83 KG/M2 | DIASTOLIC BLOOD PRESSURE: 86 MMHG

## 2018-11-12 DIAGNOSIS — Z01.419 WELL WOMAN EXAM WITH ROUTINE GYNECOLOGICAL EXAM: ICD-10-CM

## 2018-11-12 DIAGNOSIS — S81.802A LEG WOUND, LEFT, INITIAL ENCOUNTER: Primary | ICD-10-CM

## 2018-11-12 PROBLEM — M54.50 ACUTE RIGHT-SIDED LOW BACK PAIN WITHOUT SCIATICA: Status: RESOLVED | Noted: 2018-08-17 | Resolved: 2018-11-12

## 2018-11-12 PROBLEM — R10.9 FLANK PAIN: Status: RESOLVED | Noted: 2018-08-14 | Resolved: 2018-11-12

## 2018-11-12 PROBLEM — R10.11 COLICKY RIGHT UPPER QUADRANT PAIN: Status: RESOLVED | Noted: 2018-08-14 | Resolved: 2018-11-12

## 2018-11-12 PROBLEM — Z98.890 STATUS POST HYSTEROSCOPIC MYOMECTOMY: Status: RESOLVED | Noted: 2017-12-27 | Resolved: 2018-11-12

## 2018-11-12 PROBLEM — M62.830 MUSCLE SPASM OF BACK: Status: RESOLVED | Noted: 2018-08-17 | Resolved: 2018-11-12

## 2018-11-12 PROBLEM — Z98.890 STATUS POST HYSTEROSCOPIC ABLATION OF ENDOMETRIUM: Status: RESOLVED | Noted: 2017-12-27 | Resolved: 2018-11-12

## 2018-11-12 PROCEDURE — 77063 BREAST TOMOSYNTHESIS BI: CPT | Mod: TC

## 2018-11-12 PROCEDURE — 77067 SCR MAMMO BI INCL CAD: CPT | Mod: TC

## 2018-11-12 PROCEDURE — 99213 OFFICE O/P EST LOW 20 MIN: CPT | Mod: S$GLB,,, | Performed by: REGISTERED NURSE

## 2018-11-12 PROCEDURE — 3008F BODY MASS INDEX DOCD: CPT | Mod: CPTII,S$GLB,, | Performed by: REGISTERED NURSE

## 2018-11-12 PROCEDURE — 77067 SCR MAMMO BI INCL CAD: CPT | Mod: 26,,, | Performed by: RADIOLOGY

## 2018-11-12 PROCEDURE — 99999 PR PBB SHADOW E&M-EST. PATIENT-LVL III: CPT | Mod: PBBFAC,,, | Performed by: REGISTERED NURSE

## 2018-11-12 PROCEDURE — 77063 BREAST TOMOSYNTHESIS BI: CPT | Mod: 26,,, | Performed by: RADIOLOGY

## 2018-11-12 RX ORDER — CEPHALEXIN 500 MG/1
500 CAPSULE ORAL EVERY 12 HOURS
Qty: 14 CAPSULE | Refills: 0 | Status: SHIPPED | OUTPATIENT
Start: 2018-11-12 | End: 2018-11-19

## 2018-11-12 NOTE — PROGRESS NOTES
"Subjective:       Patient ID: Liana Keane is a 48 y.o. female.    Chief Complaint: Wound Check      HPI    Liana Keane is here today with c/o leg wound.  Hit her LT leg on a trailer hitch causing skin injury.  Has been keeping clean with peroxide.  Not using topical as the Neosporin breaks her skin out.  She now thinks she is allergic to the bandaids b/c it appears to be red and bumpy, itchy and developing skin rash where bandaid touched skin w/ adhesive portion.  Reports wound drainage, pain minimal.  Denies fever or chills.      Review of Systems   Constitutional: Negative for chills and fever.   Skin: Positive for wound.       Review of patient's allergies indicates:  No Known Allergies    Patient Active Problem List   Diagnosis    Seasonal allergic rhinitis due to pollen    Mild intermittent asthma without complication    Anemia    Herpes simplex type II infection    Menorrhagia with regular cycle       Current Outpatient Medications on File Prior to Visit   Medication Sig Dispense Refill    albuterol (PROAIR HFA) 90 mcg/actuation inhaler Inhale 2 puffs into the lungs every 6 (six) hours as needed. 18 g 11    GIANVI, 28, 3-0.02 mg per tablet Take 1 tablet by mouth once daily. 28 tablet 3    multivitamin-Ca-iron-minerals Tab Take by mouth once daily.           Past medical, surgical, family and social histories have been reviewed today.        Objective:     Vitals:    11/12/18 0825   BP: 112/86   Pulse: 102   Temp: 98.6 °F (37 °C)   TempSrc: Oral   Weight: 76.6 kg (168 lb 14 oz)   Height: 5' 4" (1.626 m)   PainSc:   1   PainLoc: Leg         Physical Exam   Constitutional: She is oriented to person, place, and time. She appears well-developed and well-nourished.   Neurological: She is alert and oriented to person, place, and time.   Skin:        Vitals reviewed.        Diagnosis       1. Leg wound, left, initial encounter          Assessment/ Plan     Leg wound, left, initial " encounter  · New problem reported & identified, [facility] plan and med discussed.  · Wound care.  · Orders:  -     cephALEXin (KEFLEX) 500 MG capsule; Take 1 capsule (500 mg total) by mouth every 12 (twelve) hours. for 7 days  Dispense: 14 capsule; Refill: 0        Follow-up in clinic as needed.      PARAMJIT Foley  [facility]

## 2018-11-15 ENCOUNTER — OFFICE VISIT (OUTPATIENT)
Dept: INTERNAL MEDICINE | Facility: CLINIC | Age: 48
End: 2018-11-15
Payer: COMMERCIAL

## 2018-11-15 VITALS
WEIGHT: 171.75 LBS | BODY MASS INDEX: 29.32 KG/M2 | DIASTOLIC BLOOD PRESSURE: 82 MMHG | HEART RATE: 85 BPM | SYSTOLIC BLOOD PRESSURE: 130 MMHG | OXYGEN SATURATION: 99 % | HEIGHT: 64 IN | TEMPERATURE: 100 F

## 2018-11-15 DIAGNOSIS — J30.1 SEASONAL ALLERGIC RHINITIS DUE TO POLLEN: Primary | ICD-10-CM

## 2018-11-15 PROBLEM — E66.3 OVERWEIGHT: Status: ACTIVE | Noted: 2018-11-15

## 2018-11-15 PROCEDURE — 99999 PR PBB SHADOW E&M-EST. PATIENT-LVL IV: CPT | Mod: PBBFAC,,, | Performed by: PHYSICIAN ASSISTANT

## 2018-11-15 PROCEDURE — 3008F BODY MASS INDEX DOCD: CPT | Mod: CPTII,S$GLB,, | Performed by: PHYSICIAN ASSISTANT

## 2018-11-15 PROCEDURE — 99213 OFFICE O/P EST LOW 20 MIN: CPT | Mod: S$GLB,,, | Performed by: PHYSICIAN ASSISTANT

## 2018-11-15 RX ORDER — AZITHROMYCIN 500 MG/1
500 TABLET, FILM COATED ORAL DAILY
Qty: 3 TABLET | Refills: 0 | Status: SHIPPED | OUTPATIENT
Start: 2018-11-15 | End: 2018-11-18

## 2018-11-15 RX ORDER — FLUTICASONE PROPIONATE 50 MCG
2 SPRAY, SUSPENSION (ML) NASAL DAILY
Qty: 15.8 ML | Refills: 1 | Status: SHIPPED | OUTPATIENT
Start: 2018-11-15 | End: 2021-03-25 | Stop reason: SDUPTHER

## 2018-11-15 NOTE — PROGRESS NOTES
Subjective:       Patient ID: Liana Keane is a 48 y.o.B/ female.    Chief Complaint: Cough; Headache; Nasal Congestion; and Sore Throat    HPI         She comes in today with the above problem and she is by herself.  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  She tries to keep her hands clean with .  She also has been taking Zyrtec 10 mg.  About 36 hr ago she started with nasal congestion more so on the left and stuffiness.  She also has a frontal headache and a slight sore throat.  She has had a little bit of cough but not much.  She denies any problem with:  Fever, chills, rigors, sweats, diaphoresis, night sweats, CP, pleurisy, or GI symptoms of anorexia, nausea, or vomiting.    Review of Systems   Otherwise negative concerning the:  ENT, RESPIRATORY, PULMONARY, and GI system review.      Objective:      Physical Exam    ENT:  Her left turbinates are 100% swollen but not really inflamed that much.  The right side is open with only about 30 percent swelling. There is no rhinorrhea or mucopurulence present.  Her ears and throat appear normal at this time without any redness or swelling to the posterior pharynx or the soft palate.  There are also no exudates or halitosis.  She does not have any tender glands or adenopathy present in the submandibular or the cervical region.  CHEST clear BS anterior to posterior with no wheeze, rhonchi, or rales.  She is not in any respiratory distress this time.    Assessment:       1. Seasonal allergic rhinitis due to pollen        Plan:     1.  Continue with the Zyrtec at this time.  Also want her to use Mucinex DM 1200 mg if she develops more cough.  2.  She was given proper instructions for use of Flonase to help shrink her nasal tissue.  Script for Flonase was sent to her pharmacy.  3.  Put her on Zithromax 500 mg q.d. x3 days with food.  4.  Recheck if no significant improvement in 7-10 days.

## 2019-02-06 ENCOUNTER — OFFICE VISIT (OUTPATIENT)
Dept: FAMILY MEDICINE | Facility: CLINIC | Age: 49
End: 2019-02-06
Payer: COMMERCIAL

## 2019-02-06 VITALS
DIASTOLIC BLOOD PRESSURE: 84 MMHG | HEIGHT: 64 IN | WEIGHT: 162.25 LBS | OXYGEN SATURATION: 99 % | HEART RATE: 107 BPM | TEMPERATURE: 98 F | BODY MASS INDEX: 27.7 KG/M2 | SYSTOLIC BLOOD PRESSURE: 118 MMHG

## 2019-02-06 DIAGNOSIS — J06.9 VIRAL URI: Primary | ICD-10-CM

## 2019-02-06 DIAGNOSIS — R68.89 FLU-LIKE SYMPTOMS: ICD-10-CM

## 2019-02-06 LAB
CTP QC/QA: YES
POC MOLECULAR INFLUENZA A AGN: NEGATIVE
POC MOLECULAR INFLUENZA B AGN: NEGATIVE

## 2019-02-06 PROCEDURE — 87502 POCT INFLUENZA A/B MOLECULAR: ICD-10-PCS | Mod: QW,S$GLB,, | Performed by: REGISTERED NURSE

## 2019-02-06 PROCEDURE — 99214 PR OFFICE/OUTPT VISIT, EST, LEVL IV, 30-39 MIN: ICD-10-PCS | Mod: 25,S$GLB,, | Performed by: REGISTERED NURSE

## 2019-02-06 PROCEDURE — 96372 THER/PROPH/DIAG INJ SC/IM: CPT | Mod: S$GLB,,, | Performed by: REGISTERED NURSE

## 2019-02-06 PROCEDURE — 99999 PR PBB SHADOW E&M-EST. PATIENT-LVL IV: ICD-10-PCS | Mod: PBBFAC,,, | Performed by: REGISTERED NURSE

## 2019-02-06 PROCEDURE — 99214 OFFICE O/P EST MOD 30 MIN: CPT | Mod: 25,S$GLB,, | Performed by: REGISTERED NURSE

## 2019-02-06 PROCEDURE — 3008F PR BODY MASS INDEX (BMI) DOCUMENTED: ICD-10-PCS | Mod: CPTII,S$GLB,, | Performed by: REGISTERED NURSE

## 2019-02-06 PROCEDURE — 96372 PR INJECTION,THERAP/PROPH/DIAG2ST, IM OR SUBCUT: ICD-10-PCS | Mod: S$GLB,,, | Performed by: REGISTERED NURSE

## 2019-02-06 PROCEDURE — 87502 INFLUENZA DNA AMP PROBE: CPT | Mod: QW,S$GLB,, | Performed by: REGISTERED NURSE

## 2019-02-06 PROCEDURE — 99999 PR PBB SHADOW E&M-EST. PATIENT-LVL IV: CPT | Mod: PBBFAC,,, | Performed by: REGISTERED NURSE

## 2019-02-06 PROCEDURE — 3008F BODY MASS INDEX DOCD: CPT | Mod: CPTII,S$GLB,, | Performed by: REGISTERED NURSE

## 2019-02-06 RX ORDER — BENZONATATE 100 MG/1
100-200 CAPSULE ORAL 3 TIMES DAILY PRN
Qty: 60 CAPSULE | Refills: 0 | Status: SHIPPED | OUTPATIENT
Start: 2019-02-06 | End: 2019-05-27

## 2019-02-06 RX ORDER — METHYLPREDNISOLONE ACETATE 80 MG/ML
80 INJECTION, SUSPENSION INTRA-ARTICULAR; INTRALESIONAL; INTRAMUSCULAR; SOFT TISSUE ONCE
Status: COMPLETED | OUTPATIENT
Start: 2019-02-06 | End: 2019-02-06

## 2019-02-06 RX ADMIN — METHYLPREDNISOLONE ACETATE 80 MG: 80 INJECTION, SUSPENSION INTRA-ARTICULAR; INTRALESIONAL; INTRAMUSCULAR; SOFT TISSUE at 09:02

## 2019-02-13 NOTE — PROGRESS NOTES
Subjective:       Patient ID: Liana Keane is a 48 y.o. female.    Chief Complaint   Patient presents with    Sore Throat    Headache    Otalgia       HPI    Liana Keaen is here today with c/o not feeling well since yesterday.  Reports sore throat, HA pain, ear pain, mild cough and decreased appetite.  Does c/o sneezing, NC and aches.  Tylenol-cold has not helped.      Review of Systems   Constitutional: Negative for chills and fever.   HENT: Positive for congestion, ear pain, postnasal drip, rhinorrhea, sneezing and sore throat. Negative for sinus pain.    Eyes: Negative.    Respiratory: Positive for cough. Negative for shortness of breath and wheezing.    Cardiovascular: Negative.    Musculoskeletal: Positive for myalgias.   Neurological: Positive for headaches. Negative for light-headedness.   Hematological: Negative for adenopathy.       Review of patient's allergies indicates:   Allergen Reactions    Adhesive Rash     bandaids    Neosporin [neomycin-bacitracin-polymyxin] Rash       Patient Active Problem List   Diagnosis    Seasonal allergic rhinitis due to pollen    Mild intermittent asthma without complication    Anemia    Herpes simplex type II infection    Menorrhagia with regular cycle    Overweight       Current Outpatient Medications on File Prior to Visit   Medication Sig Dispense Refill    albuterol (PROAIR HFA) 90 mcg/actuation inhaler Inhale 2 puffs into the lungs every 6 (six) hours as needed. 18 g 11    fluticasone (FLONASE) 50 mcg/actuation nasal spray 2 sprays (100 mcg total) by Each Nare route once daily. 15.8 mL 1    GIANVI, 28, 3-0.02 mg per tablet Take 1 tablet by mouth once daily. 28 tablet 3    multivitamin-Ca-iron-minerals Tab Take by mouth once daily.       No current facility-administered medications on file prior to visit.        Past medical, surgical, family and social histories have been reviewed today.        Objective:     Vitals:    02/06/19 0852   BP:  "118/84   Pulse: 107   Temp: 98.3 °F (36.8 °C)   TempSrc: Oral   SpO2: 99%   Weight: 73.6 kg (162 lb 4.1 oz)   Height: 5' 4" (1.626 m)   PainSc:   9   PainLoc: Throat         Physical Exam   Constitutional: She is oriented to person, place, and time. She appears well-developed and well-nourished.   HENT:   Head: Normocephalic and atraumatic.   Right Ear: Tympanic membrane normal.   Left Ear: Tympanic membrane normal.   Nose: Mucosal edema and rhinorrhea present.   Mouth/Throat: No oropharyngeal exudate, posterior oropharyngeal edema or posterior oropharyngeal erythema.   Eyes: Conjunctivae are normal. Pupils are equal, round, and reactive to light. Right eye exhibits no discharge. Left eye exhibits no discharge.   Cardiovascular: Regular rhythm and normal heart sounds. Tachycardia present.   Pulmonary/Chest: Effort normal and breath sounds normal.   Lymphadenopathy:     She has no cervical adenopathy.   Neurological: She is alert and oriented to person, place, and time.   Vitals reviewed.        Component      Latest Ref Rng & Units 2/6/2019   POC Molecular Influenza A Ag      Negative, Not Reported Negative   POC Molecular Influenza B Ag      Negative, Not Reported Negative    Acceptable       Yes       Diagnosis       1. Viral URI    2. Flu-like symptoms          Assessment/ Plan     Viral URI  -     methylPREDNISolone acetate injection 80 mg  -     acrivastine-pseudoephedrine 8-60 mg Cap; Take 1 tablet by mouth every 4 to 6 hours as needed.  Dispense: 30 each; Refill: 0  -     benzonatate (TESSALON) 100 MG capsule; Take 1-2 capsules (100-200 mg total) by mouth 3 (three) times daily as needed for Cough.  Dispense: 60 capsule; Refill: 0    Flu-like symptoms  -     POCT Influenza A/B Molecular  -     methylPREDNISolone acetate injection 80 mg        Medication discussed, as directed.  Injection today.  Rest and fluids.  Follow-up in clinic as needed.        PARAMJIT Foley  [facility]

## 2019-02-18 ENCOUNTER — OFFICE VISIT (OUTPATIENT)
Dept: INTERNAL MEDICINE | Facility: CLINIC | Age: 49
End: 2019-02-18
Payer: COMMERCIAL

## 2019-02-18 VITALS
TEMPERATURE: 98 F | HEART RATE: 84 BPM | OXYGEN SATURATION: 98 % | WEIGHT: 161.38 LBS | BODY MASS INDEX: 27.7 KG/M2 | DIASTOLIC BLOOD PRESSURE: 82 MMHG | SYSTOLIC BLOOD PRESSURE: 120 MMHG

## 2019-02-18 DIAGNOSIS — J45.20 MILD INTERMITTENT ASTHMA WITHOUT COMPLICATION: Chronic | ICD-10-CM

## 2019-02-18 DIAGNOSIS — J06.9 VIRAL URI WITH COUGH: Primary | ICD-10-CM

## 2019-02-18 PROCEDURE — 99214 PR OFFICE/OUTPT VISIT, EST, LEVL IV, 30-39 MIN: ICD-10-PCS | Mod: S$GLB,,, | Performed by: FAMILY MEDICINE

## 2019-02-18 PROCEDURE — 99999 PR PBB SHADOW E&M-EST. PATIENT-LVL III: ICD-10-PCS | Mod: PBBFAC,,, | Performed by: FAMILY MEDICINE

## 2019-02-18 PROCEDURE — 3008F BODY MASS INDEX DOCD: CPT | Mod: CPTII,S$GLB,, | Performed by: FAMILY MEDICINE

## 2019-02-18 PROCEDURE — 3008F PR BODY MASS INDEX (BMI) DOCUMENTED: ICD-10-PCS | Mod: CPTII,S$GLB,, | Performed by: FAMILY MEDICINE

## 2019-02-18 PROCEDURE — 99999 PR PBB SHADOW E&M-EST. PATIENT-LVL III: CPT | Mod: PBBFAC,,, | Performed by: FAMILY MEDICINE

## 2019-02-18 PROCEDURE — 99214 OFFICE O/P EST MOD 30 MIN: CPT | Mod: S$GLB,,, | Performed by: FAMILY MEDICINE

## 2019-02-18 RX ORDER — PREDNISONE 5 MG/1
TABLET ORAL
Qty: 30 TABLET | Refills: 0 | Status: SHIPPED | OUTPATIENT
Start: 2019-02-18 | End: 2019-05-27

## 2019-02-18 NOTE — PROGRESS NOTES
CHIEF COMPLAINT  Cough  This problem is NEW TO ME.     HISTORY OF PRESENT ILLNESS  Onset reported as about two weeks ago.  Quality described as nonproductive.  Timing of cough described as occurring mainly during the day, and not bothering her at night.  Severity described as moderate to moderately severe.  Symptoms occur in the context of recent viral upper respiratory infection symptoms (02/06/2019 progress note reviewed) and underlying mild intermittent asthma.  She reports no fever or sweats. I educated her on the apparently post-viral nature of her cough, how the management was largely supportive and symptom focused. We discussed the risks and benefits of treatment options, and she feels that the severity of her symptoms is sufficient to warrant the treatment prescribed. I discouraged the use of over-the-counter cough and cold medications. I told her to expect gradual improvement and resolution of symptoms over the next week or two, and I instructed her to let me know if her illness failed to follow this expected course.     Problem List Items Addressed This Visit        Pulmonary    Mild intermittent asthma without complication (Chronic)    Relevant Medications    predniSONE (DELTASONE) 5 MG tablet      Other Visit Diagnoses     Viral URI with cough    -  Primary    Relevant Medications    predniSONE (DELTASONE) 5 MG tablet            REVIEW OF SYSTEMS  CONSTITUTIONAL: No fever reported.  PULMONARY: No hemoptysis or difficulty breathing reported.  CARDIOVASCULAR: No chest pain or orthopnea reported.     PHYSICAL EXAM  Vitals:    02/18/19 0840   BP: 120/82   BP Location: Right arm   Patient Position: Sitting   Pulse: 84   Temp: 98.1 °F (36.7 °C)   TempSrc: Tympanic   SpO2: 98%   Weight: 73.2 kg (161 lb 6 oz)     CONSTITUTIONAL: Vital signs noted. No apparent distress. Does not appear acutely ill or septic. Appears adequately hydrated.  EYES: Pupils equal and reactive. Extraocular movements intact. Sclerae  "anicteric. Lids and conjunctiva unremarkable.  ENT: External ENT grossly unremarkable. Ear canals and visualized tympanic membranes are unremarkable. Hearing grossly intact. Nasal mucosa pink. Oropharynx moist without lesion, inflammation or exudate. Posterior oropharynx is symmetric.  NECK: Trachea midline. No significant cervical lymphadenopathy.  PULM: Lungs clear. Breathing unlabored.  HEART: Auscultation reveals regular rate and rhythm.  DERM: Skin warm and moist with normal turgor.     ASSESSMENT & PLAN (DIAGNOSES & ORDERS)  1. Viral URI with cough  predniSONE (DELTASONE) 5 MG tablet   2. Mild intermittent asthma without complication  predniSONE (DELTASONE) 5 MG tablet       Follow-up if symptoms worsen or fail to improve.    Documentation entered by me for this encounter may have been done in part using speech-recognition technology. Although I have made an effort to ensure accuracy, "sound like" errors may exist and should be interpreted in context. -LATISHA Simmons MD      There are no Patient Instructions on file for this visit.   "

## 2019-03-08 DIAGNOSIS — N92.0 MENORRHAGIA WITH REGULAR CYCLE: ICD-10-CM

## 2019-03-08 NOTE — TELEPHONE ENCOUNTER
Pt requesting refill on GIANVI, pt last annual was 10/24/2018. Pharmacy up to date, medication pending. Please advise.

## 2019-05-27 ENCOUNTER — OFFICE VISIT (OUTPATIENT)
Dept: INTERNAL MEDICINE | Facility: CLINIC | Age: 49
End: 2019-05-27
Payer: COMMERCIAL

## 2019-05-27 VITALS
HEIGHT: 64 IN | OXYGEN SATURATION: 99 % | BODY MASS INDEX: 28.15 KG/M2 | TEMPERATURE: 98 F | WEIGHT: 164.88 LBS | SYSTOLIC BLOOD PRESSURE: 132 MMHG | DIASTOLIC BLOOD PRESSURE: 78 MMHG | HEART RATE: 103 BPM

## 2019-05-27 DIAGNOSIS — J30.1 SEASONAL ALLERGIC RHINITIS DUE TO POLLEN: ICD-10-CM

## 2019-05-27 DIAGNOSIS — J06.9 VIRAL URI WITH COUGH: Primary | ICD-10-CM

## 2019-05-27 DIAGNOSIS — D64.9 ANEMIA, UNSPECIFIED TYPE: ICD-10-CM

## 2019-05-27 DIAGNOSIS — J45.20 MILD INTERMITTENT ASTHMA WITHOUT COMPLICATION: Chronic | ICD-10-CM

## 2019-05-27 PROCEDURE — 3008F BODY MASS INDEX DOCD: CPT | Mod: CPTII,S$GLB,, | Performed by: FAMILY MEDICINE

## 2019-05-27 PROCEDURE — 3008F PR BODY MASS INDEX (BMI) DOCUMENTED: ICD-10-PCS | Mod: CPTII,S$GLB,, | Performed by: FAMILY MEDICINE

## 2019-05-27 PROCEDURE — 99999 PR PBB SHADOW E&M-EST. PATIENT-LVL III: ICD-10-PCS | Mod: PBBFAC,,, | Performed by: FAMILY MEDICINE

## 2019-05-27 PROCEDURE — 99214 OFFICE O/P EST MOD 30 MIN: CPT | Mod: S$GLB,,, | Performed by: FAMILY MEDICINE

## 2019-05-27 PROCEDURE — 99214 PR OFFICE/OUTPT VISIT, EST, LEVL IV, 30-39 MIN: ICD-10-PCS | Mod: S$GLB,,, | Performed by: FAMILY MEDICINE

## 2019-05-27 PROCEDURE — 99999 PR PBB SHADOW E&M-EST. PATIENT-LVL III: CPT | Mod: PBBFAC,,, | Performed by: FAMILY MEDICINE

## 2019-05-27 RX ORDER — ALBUTEROL SULFATE 90 UG/1
2 AEROSOL, METERED RESPIRATORY (INHALATION) EVERY 8 HOURS PRN
Qty: 18 G | Refills: 1 | Status: SHIPPED | OUTPATIENT
Start: 2019-05-27 | End: 2021-01-04 | Stop reason: SDUPTHER

## 2019-05-27 RX ORDER — PREDNISONE 5 MG/1
TABLET ORAL
Qty: 30 TABLET | Refills: 0 | Status: SHIPPED | OUTPATIENT
Start: 2019-05-27 | End: 2019-07-29

## 2019-05-27 NOTE — ASSESSMENT & PLAN NOTE
Lab Results   Component Value Date    WBC 4.66 08/14/2018    HGB 12.2 08/14/2018    HCT 38.4 08/14/2018    MCV 95 08/14/2018     08/14/2018     This condition appears to be RESOLVED.

## 2019-05-27 NOTE — PROGRESS NOTES
CHIEF COMPLAINT  URI      HISTORY, ASSESSMENT, and PLAN    1. Viral URI with cough   ASSESSMENT:  New problem.  Onset reported as one week ago.  Quality described as nasal congestion and coryza.  Associated symptoms include scratchy sore throat, raspy voice, and nonproductive cough.  Severity of symptoms described as moderately severe at worst, moderate at present.  Timing of symptoms described as worse at night and in the early morning, improving throughout the day.  I educated her on the apparently viral nature of this acute illness, how the management was largely supportive and symptom focused. We discussed the risks and benefits of treatment options, and she feels that the severity of her symptoms is sufficient to warrant the treatment prescribed. I discouraged the use of over-the-counter cough and cold medications. I told her to expect gradual improvement and resolution of symptoms over the next week or two, and I instructed her to let me know if her illness failed to follow this expected course.      2. Mild intermittent asthma without complication   ASSESSMENT:  Well controlled, the above not withstanding.     3. Seasonal allergic rhinitis due to pollen   ASSESSMENT:  Well controlled, the above not withstanding.     4. Anemia, unspecified type   ASSESSMENT:  Resolved.       Orders Placed This Encounter    albuterol (PROAIR HFA) 90 mcg/actuation inhaler    predniSONE (DELTASONE) 5 MG tablet        Problem List Items Addressed This Visit        Pulmonary    Mild intermittent asthma without complication (Chronic)    Relevant Medications    albuterol (PROAIR HFA) 90 mcg/actuation inhaler       Oncology    RESOLVED: Anemia    Current Assessment & Plan     Lab Results   Component Value Date    WBC 4.66 08/14/2018    HGB 12.2 08/14/2018    HCT 38.4 08/14/2018    MCV 95 08/14/2018     08/14/2018     This condition appears to be RESOLVED.             Other    Seasonal allergic rhinitis due to pollen       Other Visit Diagnoses     Viral URI with cough    -  Primary    Relevant Medications    predniSONE (DELTASONE) 5 MG tablet           Outpatient Medications Prior to Visit   Medication Sig Dispense Refill    fluticasone (FLONASE) 50 mcg/actuation nasal spray 2 sprays (100 mcg total) by Each Nare route once daily. 15.8 mL 1    GIANVI, 28, 3-0.02 mg per tablet Take 1 tablet by mouth once daily. 28 tablet 7    multivitamin-Ca-iron-minerals Tab Take by mouth once daily.      albuterol (PROAIR HFA) 90 mcg/actuation inhaler Inhale 2 puffs into the lungs every 6 (six) hours as needed. 18 g 11    benzonatate (TESSALON) 100 MG capsule Take 1-2 capsules (100-200 mg total) by mouth 3 (three) times daily as needed for Cough. 60 capsule 0    predniSONE (DELTASONE) 5 MG tablet Take 4 tablets by mouth daily for 3 days, then 3 tablets daily for 3 days, then 2 tablets daily for 3 days, then 1 tablet daily for 3 days 30 tablet 0     No facility-administered medications prior to visit.         Medications Ordered This Encounter   Medications    albuterol (PROAIR HFA) 90 mcg/actuation inhaler     Sig: Inhale 2 puffs into the lungs every 8 (eight) hours as needed.     Dispense:  18 g     Refill:  1    predniSONE (DELTASONE) 5 MG tablet     Sig: Take 4 tablets by mouth daily for 3 days, then 3 tablets daily for 3 days, then 2 tablets daily for 3 days, then 1 tablet daily for 3 days     Dispense:  30 tablet     Refill:  0       Medications Discontinued During This Encounter   Medication Reason    benzonatate (TESSALON) 100 MG capsule Patient no longer taking    predniSONE (DELTASONE) 5 MG tablet Patient no longer taking    albuterol (PROAIR HFA) 90 mcg/actuation inhaler Reorder        Follow up if symptoms worsen or fail to improve, for wellness and preventive services.    REVIEW OF SYSTEMS  CONSTITUTIONAL: No objective fever reported.  PULMONARY: No hemoptysis or difficulty breathing reported.  CARDIOVASCULAR: No chest pain  "or orthopnea reported.     PHYSICAL EXAM  Vitals:    05/27/19 1309   BP: 132/78   BP Location: Right arm   Patient Position: Sitting   BP Method: Medium (Manual)   Pulse: 103   Temp: 98.1 °F (36.7 °C)   TempSrc: Tympanic   SpO2: 99%   Weight: 74.8 kg (164 lb 14.5 oz)   Height: 5' 4" (1.626 m)     CONSTITUTIONAL: Vital signs noted. No apparent distress. Does not appear acutely ill or septic. Appears adequately hydrated.  EYES: Pupils equal and reactive. Extraocular movements intact. Sclerae anicteric. Lids and conjunctiva unremarkable.  ENT: External ENT grossly unremarkable. Ear canals and visualized tympanic membranes are unremarkable. Hearing grossly intact. Nasal mucosa pink. Oropharynx moist. Posterior oropharynx is reasonably symmetric with minimal erythema and mild cobblestoning, but is without lesion, ulceration, or exudate.  NECK: Trachea midline. No significant cervical lymphadenopathy.  PULM: Lungs clear. Breathing unlabored.  HEART: Auscultation reveals regular rate and rhythm.  DERM: Skin warm and moist with normal turgor.     Documentation entered by me for this encounter may have been done in part using speech-recognition technology. Although I have made an effort to ensure accuracy, "sound like" errors may exist and should be interpreted in context. -LATISHA Simmons MD.   "

## 2019-07-29 ENCOUNTER — LAB VISIT (OUTPATIENT)
Dept: LAB | Facility: HOSPITAL | Age: 49
End: 2019-07-29
Payer: COMMERCIAL

## 2019-07-29 ENCOUNTER — OFFICE VISIT (OUTPATIENT)
Dept: INTERNAL MEDICINE | Facility: CLINIC | Age: 49
End: 2019-07-29
Payer: COMMERCIAL

## 2019-07-29 VITALS
DIASTOLIC BLOOD PRESSURE: 76 MMHG | WEIGHT: 167.13 LBS | HEART RATE: 82 BPM | HEIGHT: 65 IN | BODY MASS INDEX: 27.85 KG/M2 | TEMPERATURE: 99 F | OXYGEN SATURATION: 99 % | SYSTOLIC BLOOD PRESSURE: 106 MMHG

## 2019-07-29 DIAGNOSIS — Z00.00 PREVENTATIVE HEALTH CARE: Primary | ICD-10-CM

## 2019-07-29 DIAGNOSIS — R53.83 FATIGUE, UNSPECIFIED TYPE: ICD-10-CM

## 2019-07-29 DIAGNOSIS — Z12.39 SCREENING FOR BREAST CANCER: ICD-10-CM

## 2019-07-29 DIAGNOSIS — Z13.220 SCREENING FOR LIPID DISORDERS: ICD-10-CM

## 2019-07-29 DIAGNOSIS — J30.1 SEASONAL ALLERGIC RHINITIS DUE TO POLLEN: ICD-10-CM

## 2019-07-29 DIAGNOSIS — R30.0 DYSURIA: ICD-10-CM

## 2019-07-29 DIAGNOSIS — Z11.4 SCREENING FOR HIV WITHOUT PRESENCE OF RISK FACTORS: ICD-10-CM

## 2019-07-29 DIAGNOSIS — Z00.00 PREVENTATIVE HEALTH CARE: ICD-10-CM

## 2019-07-29 DIAGNOSIS — Z13.1 SCREENING FOR DIABETES MELLITUS: ICD-10-CM

## 2019-07-29 DIAGNOSIS — E66.3 OVERWEIGHT: ICD-10-CM

## 2019-07-29 LAB
BACTERIA #/AREA URNS HPF: ABNORMAL /HPF
BASOPHILS # BLD AUTO: 0.03 K/UL (ref 0–0.2)
BASOPHILS NFR BLD: 0.6 % (ref 0–1.9)
BILIRUB UR QL STRIP: NEGATIVE
CHOLEST SERPL-MCNC: 165 MG/DL (ref 120–199)
CHOLEST/HDLC SERPL: 2 {RATIO} (ref 2–5)
CLARITY UR: CLEAR
COLOR UR: YELLOW
DIFFERENTIAL METHOD: ABNORMAL
EOSINOPHIL # BLD AUTO: 0.1 K/UL (ref 0–0.5)
EOSINOPHIL NFR BLD: 1.7 % (ref 0–8)
ERYTHROCYTE [DISTWIDTH] IN BLOOD BY AUTOMATED COUNT: 12.5 % (ref 11.5–14.5)
GLUCOSE SERPL-MCNC: 89 MG/DL (ref 70–110)
GLUCOSE UR QL STRIP: NEGATIVE
HCT VFR BLD AUTO: 35.7 % (ref 37–48.5)
HDLC SERPL-MCNC: 83 MG/DL (ref 40–75)
HDLC SERPL: 50.3 % (ref 20–50)
HGB BLD-MCNC: 11.5 G/DL (ref 12–16)
HGB UR QL STRIP: ABNORMAL
IMM GRANULOCYTES # BLD AUTO: 0.02 K/UL (ref 0–0.04)
IMM GRANULOCYTES NFR BLD AUTO: 0.4 % (ref 0–0.5)
KETONES UR QL STRIP: NEGATIVE
LDLC SERPL CALC-MCNC: 63.4 MG/DL (ref 63–159)
LEUKOCYTE ESTERASE UR QL STRIP: NEGATIVE
LYMPHOCYTES # BLD AUTO: 1.4 K/UL (ref 1–4.8)
LYMPHOCYTES NFR BLD: 28.9 % (ref 18–48)
MCH RBC QN AUTO: 29.9 PG (ref 27–31)
MCHC RBC AUTO-ENTMCNC: 32.2 G/DL (ref 32–36)
MCV RBC AUTO: 93 FL (ref 82–98)
MICROSCOPIC COMMENT: ABNORMAL
MONOCYTES # BLD AUTO: 0.3 K/UL (ref 0.3–1)
MONOCYTES NFR BLD: 6.1 % (ref 4–15)
NEUTROPHILS # BLD AUTO: 3 K/UL (ref 1.8–7.7)
NEUTROPHILS NFR BLD: 62.3 % (ref 38–73)
NITRITE UR QL STRIP: NEGATIVE
NONHDLC SERPL-MCNC: 82 MG/DL
NRBC BLD-RTO: 0 /100 WBC
PH UR STRIP: 6 [PH] (ref 5–8)
PLATELET # BLD AUTO: 319 K/UL (ref 150–350)
PMV BLD AUTO: 11.2 FL (ref 9.2–12.9)
PROT UR QL STRIP: NEGATIVE
RBC # BLD AUTO: 3.85 M/UL (ref 4–5.4)
RBC #/AREA URNS HPF: 10 /HPF (ref 0–4)
SP GR UR STRIP: 1.02 (ref 1–1.03)
SQUAMOUS #/AREA URNS HPF: 5 /HPF
TRIGL SERPL-MCNC: 93 MG/DL (ref 30–150)
TSH SERPL DL<=0.005 MIU/L-ACNC: 0.93 UIU/ML (ref 0.4–4)
URN SPEC COLLECT METH UR: ABNORMAL
WBC # BLD AUTO: 4.77 K/UL (ref 3.9–12.7)
WBC #/AREA URNS HPF: 2 /HPF (ref 0–5)

## 2019-07-29 PROCEDURE — 82947 ASSAY GLUCOSE BLOOD QUANT: CPT

## 2019-07-29 PROCEDURE — 99396 PR PREVENTIVE VISIT,EST,40-64: ICD-10-PCS | Mod: S$GLB,,, | Performed by: FAMILY MEDICINE

## 2019-07-29 PROCEDURE — 99396 PREV VISIT EST AGE 40-64: CPT | Mod: S$GLB,,, | Performed by: FAMILY MEDICINE

## 2019-07-29 PROCEDURE — 36415 COLL VENOUS BLD VENIPUNCTURE: CPT

## 2019-07-29 PROCEDURE — 84443 ASSAY THYROID STIM HORMONE: CPT

## 2019-07-29 PROCEDURE — 80061 LIPID PANEL: CPT

## 2019-07-29 PROCEDURE — 86703 HIV-1/HIV-2 1 RESULT ANTBDY: CPT

## 2019-07-29 PROCEDURE — 99999 PR PBB SHADOW E&M-EST. PATIENT-LVL III: ICD-10-PCS | Mod: PBBFAC,,, | Performed by: FAMILY MEDICINE

## 2019-07-29 PROCEDURE — 81000 URINALYSIS NONAUTO W/SCOPE: CPT

## 2019-07-29 PROCEDURE — 99999 PR PBB SHADOW E&M-EST. PATIENT-LVL III: CPT | Mod: PBBFAC,,, | Performed by: FAMILY MEDICINE

## 2019-07-29 PROCEDURE — 85025 COMPLETE CBC W/AUTO DIFF WBC: CPT

## 2019-07-29 NOTE — PROGRESS NOTES
CHIEF COMPLAINT  Annual Exam      HISTORY OF PRESENT ILLNESS (PREVENTIVE SERVICES)    HEALTH MAINTENANCE INTERVENTIONS - UP TO DATE  Health Maintenance Topics with due status: Not Due       Topic Last Completion Date    TETANUS VACCINE 10/19/2015    Influenza Vaccine 10/24/2017    Pap Smear 10/24/2018    Mammogram 11/12/2018       HEALTH MAINTENANCE INTERVENTIONS - DUE OR DUE SOON  Health Maintenance Due   Topic Date Due    Lipid Panel  10/27/2018       · HYPERTENSION SCREENING: negative  BP Readings from Last 3 Encounters:   07/29/19 106/76   05/27/19 132/78   02/18/19 120/82        · OBESITY SCREENING: negative  Wt Readings from Last 3 Encounters:   07/29/19 75.8 kg (167 lb 1.7 oz)   05/27/19 74.8 kg (164 lb 14.5 oz)   02/18/19 73.2 kg (161 lb 6 oz)     BMI Readings from Last 3 Encounters:   07/29/19 28.24 kg/m²   05/27/19 28.31 kg/m²   02/18/19 27.70 kg/m²        · DYSLIPIDEMIA SCREENING: ordered  Lab Results   Component Value Date    CHOL 165 10/27/2017    TRIG 77 10/27/2017    HDL 81 (H) 10/27/2017    LDLCALC 68.6 10/27/2017    NONHDLCHOL 84 10/27/2017       · DIABETES SCREENING: ordered  Lab Results   Component Value Date    GLU 86 08/14/2018       · HIV SCREENING: ordered - She reports no specific exposure or risk factor or specific relevant symptoms, but she accepted offer for screening.  Lab Results   Component Value Date    [id number] Negative 10/20/2016        · TOBACCO USE SCREENING: negative  She  reports that she has never smoked. She has never used smokeless tobacco.    · ALCOHOL MISUSE SCREENING: negative  She  reports that she drinks alcohol.    · DEPRESSION SCREENING: negative    · INTIMATE PARTNER VIOLENCE SCREENING: negative    · IMMUNIZATIONS: reported as up to date according to current CDC guidelines.    · COLORECTAL CANCER SCREENING: not indicated     · CERVICAL CANCER SCREENING: current - not needed at this time    · BREAST CANCER SCREENING: not indicated    Problem List Items Addressed This  Visit        Endocrine    Overweight    Current Assessment & Plan     Wt Readings from Last 5 Encounters:   07/29/19 75.8 kg (167 lb 1.7 oz)   05/27/19 74.8 kg (164 lb 14.5 oz)   02/18/19 73.2 kg (161 lb 6 oz)   02/06/19 73.6 kg (162 lb 4.1 oz)   11/15/18 77.9 kg (171 lb 11.8 oz)     Body mass index is 28.24 kg/m².             Other    Seasonal allergic rhinitis due to pollen    Current Assessment & Plan     Controlled.           Other Visit Diagnoses     Preventative health care    -  Primary    Relevant Orders    Lipid panel    Glucose, random    Mammo Digital Screening Bilat    HIV 1/2 Ag/Ab (4th Gen)    Screening for diabetes mellitus        Screening for lipid disorders        Relevant Orders    Lipid panel    Dysuria        Relevant Orders    Urinalysis    Screening for breast cancer        Relevant Orders    Mammo Digital Screening Bilat    Screening for HIV without presence of risk factors        Relevant Orders    HIV 1/2 Ag/Ab (4th Gen)    Fatigue, unspecified type        Relevant Orders    CBC auto differential    TSH        She reports a degree of fatigue with with insufficient sleep.  She request that I check her for anemia and underactive thyroid.  She reports that she has had some mild dysuria over the last few days, which she suspects is due to relative dehydration.  We discussed strategies for helping her maintain adequate fluid hydration.    Outpatient Medications Prior to Visit   Medication Sig Dispense Refill    albuterol (PROAIR HFA) 90 mcg/actuation inhaler Inhale 2 puffs into the lungs every 8 (eight) hours as needed. 18 g 1    fluticasone (FLONASE) 50 mcg/actuation nasal spray 2 sprays (100 mcg total) by Each Nare route once daily. 15.8 mL 1    GIANVI, 28, 3-0.02 mg per tablet Take 1 tablet by mouth once daily. 28 tablet 7    multivitamin-Ca-iron-minerals Tab Take by mouth once daily.      predniSONE (DELTASONE) 5 MG tablet Take 4 tablets by mouth daily for 3 days, then 3 tablets daily  "for 3 days, then 2 tablets daily for 3 days, then 1 tablet daily for 3 days 30 tablet 0     No facility-administered medications prior to visit.              Medications Discontinued During This Encounter   Medication Reason    predniSONE (DELTASONE) 5 MG tablet Patient no longer taking        Follow up in about 1 year (around 7/29/2020) for wellness and preventive services.      REVIEW OF SYSTEMS  Answers for HPI/ROS submitted by the patient on 7/24/2019   activity change: No  unexpected weight change: No  neck pain: No  hearing loss: No  rhinorrhea: No  trouble swallowing: No  eye discharge: No  visual disturbance: No  chest tightness: No  wheezing: No  chest pain: No  palpitations: No  blood in stool: No  constipation: No  vomiting: No  diarrhea: No  polydipsia: No  polyuria: No  difficulty urinating: No  hematuria: No  menstrual problem: No  dysuria: No  joint swelling: No  arthralgias: No  headaches: No  weakness: No  confusion: No  dysphoric mood: No    PHYSICAL EXAM  Vitals:    07/29/19 0809   BP: 106/76   BP Location: Right arm   Patient Position: Sitting   Pulse: 82   Temp: 98.6 °F (37 °C)   TempSrc: Tympanic   SpO2: 99%   Weight: 75.8 kg (167 lb 1.7 oz)   Height: 5' 4.5" (1.638 m)     CONSTITUTIONAL: Vital signs noted. No apparent distress. Does not appear acutely ill or septic. Appears adequately hydrated.  EYE: Sclerae anicteric. Lids and conjunctiva unremarkable.  ENT: External ENT unremarkable. Oropharynx moist.  NECK: Trachea midline. Thyroid nontender.  PULM: Lungs clear. Breathing unlabored.  CV: Auscultation reveals regular rate and rhythm without murmur, gallop or rub. No carotid bruit.  GI: Soft and nontender. Bowel sounds normal.  DERM: Skin warm and moist with normal turgor.  PSYCH: Alert and oriented x 3. Mood is grossly neutral. Affect appropriate. Judgment and insight not grossly compromised.      PAST MEDICAL HISTORY  She has a past medical history of Acne, Anemia, Herpes simplex type II " infection, and Multinodular goiter.    SURGICAL HISTORY  She has a past surgical history that includes FNA thyroid nodule (April 2014); Thyroidectomy, partial (02/2015); Hernia repair; and uterine ablation (12/27/2017).    FAMILY HISTORY  Her family history includes Allergic rhinitis in her sister; Asthma in her sister; Cancer in her other; Diabetes in her maternal grandfather; Hypertension in her maternal grandmother and mother; Thyroid disease in her mother.     ALLERGIES  Review of patient's allergies indicates:   Allergen Reactions    Adhesive Rash     bandaids    Neosporin [neomycin-bacitracin-polymyxin] Rash       SOCIAL HISTORY   TOBACCO USE HISTORY: She reports that she has never smoked. She has never used smokeless tobacco.   ALCOHOL USE HISTORY:  She reports that she drinks alcohol.   RECREATIONAL DRUG USE HISTORY:  She reports that she does not use drugs.    ABOUT THIS DOCUMENTATION:  · Documentation entered by me for this encounter was done in part using speech-recognition technology. Although I have made an effort to ensure accuracy, malapropisms may exist and should be interpreted in context.                        DANYELL Simmons MD

## 2019-07-29 NOTE — ASSESSMENT & PLAN NOTE
Wt Readings from Last 5 Encounters:   07/29/19 75.8 kg (167 lb 1.7 oz)   05/27/19 74.8 kg (164 lb 14.5 oz)   02/18/19 73.2 kg (161 lb 6 oz)   02/06/19 73.6 kg (162 lb 4.1 oz)   11/15/18 77.9 kg (171 lb 11.8 oz)     Body mass index is 28.24 kg/m².

## 2019-07-30 LAB — HIV 1+2 AB+HIV1 P24 AG SERPL QL IA: NEGATIVE

## 2019-07-31 NOTE — PROGRESS NOTES
"Hi, Liana.      I'm happy to report that these test results are NORMAL or at least ACCEPTABLE.    Want to learn more about your test results and what they mean? It's as simple as 1, 2, 3.     (1) Log in to your [facility] account at [email or web address]     (2) From the "View test results" tab, click on the test you want to know more about.     (3) Click on the "About This Test" link.    We can discuss your test results further at your next appointment.  If you have any questions, be sure to write them down and bring them to your appointment.  If you have any urgent questions in the meantime, please send me a message using [facility], or you can call my office at [phone].    Thanks for letting me care for you, thanks for trusting us with your healthcare needs, and thanks for using [facility].    Sincerely,    LATISHA Simmons MD"

## 2019-10-16 ENCOUNTER — PATIENT MESSAGE (OUTPATIENT)
Dept: OBSTETRICS AND GYNECOLOGY | Facility: CLINIC | Age: 49
End: 2019-10-16

## 2019-10-16 DIAGNOSIS — N92.0 MENORRHAGIA WITH REGULAR CYCLE: ICD-10-CM

## 2019-10-16 RX ORDER — DROSPIRENONE AND ETHINYL ESTRADIOL 0.02-3(28)
KIT ORAL
Qty: 84 TABLET | Refills: 0 | OUTPATIENT
Start: 2019-10-16

## 2019-11-11 DIAGNOSIS — N92.0 MENORRHAGIA WITH REGULAR CYCLE: ICD-10-CM

## 2019-11-11 RX ORDER — DROSPIRENONE AND ETHINYL ESTRADIOL 0.02-3(28)
KIT ORAL
Qty: 28 TABLET | Refills: 0 | Status: SHIPPED | OUTPATIENT
Start: 2019-11-11 | End: 2019-11-12 | Stop reason: SDUPTHER

## 2019-11-12 ENCOUNTER — OFFICE VISIT (OUTPATIENT)
Dept: OBSTETRICS AND GYNECOLOGY | Facility: CLINIC | Age: 49
End: 2019-11-12
Payer: COMMERCIAL

## 2019-11-12 VITALS
BODY MASS INDEX: 28.61 KG/M2 | SYSTOLIC BLOOD PRESSURE: 118 MMHG | DIASTOLIC BLOOD PRESSURE: 78 MMHG | WEIGHT: 169.31 LBS

## 2019-11-12 DIAGNOSIS — N92.0 MENORRHAGIA WITH REGULAR CYCLE: ICD-10-CM

## 2019-11-12 DIAGNOSIS — Z01.419 WELL WOMAN EXAM WITH ROUTINE GYNECOLOGICAL EXAM: Primary | ICD-10-CM

## 2019-11-12 PROCEDURE — 99999 PR PBB SHADOW E&M-EST. PATIENT-LVL II: ICD-10-PCS | Mod: PBBFAC,,, | Performed by: OBSTETRICS & GYNECOLOGY

## 2019-11-12 PROCEDURE — 99396 PREV VISIT EST AGE 40-64: CPT | Mod: S$GLB,,, | Performed by: OBSTETRICS & GYNECOLOGY

## 2019-11-12 PROCEDURE — 99999 PR PBB SHADOW E&M-EST. PATIENT-LVL II: CPT | Mod: PBBFAC,,, | Performed by: OBSTETRICS & GYNECOLOGY

## 2019-11-12 PROCEDURE — 99396 PR PREVENTIVE VISIT,EST,40-64: ICD-10-PCS | Mod: S$GLB,,, | Performed by: OBSTETRICS & GYNECOLOGY

## 2019-11-12 RX ORDER — DROSPIRENONE AND ETHINYL ESTRADIOL 0.02-3(28)
1 KIT ORAL DAILY
Qty: 84 TABLET | Refills: 4 | Status: SHIPPED | OUTPATIENT
Start: 2019-11-12 | End: 2020-11-16 | Stop reason: ALTCHOICE

## 2019-11-12 NOTE — PROGRESS NOTES
Subjective:       Patient ID: Liana Keane is a 49 y.o. female.    Chief Complaint:  Gynecologic Exam      History of Present Illness  HPI  Annual Exam-Premenopausal  Patient presents for annual exam. The patient has no complaints today. The patient is sexually active. GYN screening history: last pap: approximate date  and was normal and last mammogram: approximate date 2018 and was normal. The patient wears seatbelts: yes. The patient participates in regular exercise: no. Has the patient ever been transfused or tattooed?: no. The patient reports that there is not domestic violence in her life.  Menses are regular with periods lasting 3-4 days.  Denies excessive bleeding but does experience intense cramping for 1-2 days.  Doing well on OCP.  Desires refill.      GYN & OB History  Patient's last menstrual period was 10/28/2019 (exact date).   Date of Last Pap: 10/30/2018    OB History    Para Term  AB Living   4 2 2   2 2   SAB TAB Ectopic Multiple Live Births   1 1     1      # Outcome Date GA Lbr Shahram/2nd Weight Sex Delivery Anes PTL Lv   4 TAB            3 SAB            2 Term     M Vag-Spont      1 Term     M Vag-Spont   BALDOMERO       Review of Systems  Review of Systems   Constitutional: Negative for activity change, appetite change, chills, fatigue, fever and unexpected weight change.   Respiratory: Negative for shortness of breath.    Cardiovascular: Negative for chest pain, palpitations and leg swelling.   Gastrointestinal: Negative for abdominal pain, bloating, blood in stool, constipation, diarrhea, nausea and vomiting.   Genitourinary: Positive for dysmenorrhea. Negative for dyspareunia, dysuria, flank pain, frequency, genital sores, hematuria, menorrhagia, menstrual problem, pelvic pain, urgency, vaginal bleeding, vaginal discharge, vaginal pain, urinary incontinence, postcoital bleeding, vaginal dryness and vaginal odor.   Musculoskeletal: Negative for back pain.   Integumentary:   Negative for breast mass, nipple discharge, breast skin changes and breast tenderness.   Neurological: Negative for syncope and headaches.   Breast: Negative for asymmetry, lump, mass, mastodynia, nipple discharge, skin changes and tenderness          Objective:    Physical Exam:   Constitutional: She is oriented to person, place, and time. She appears well-developed and well-nourished. No distress.    HENT:   Head: Normocephalic and atraumatic.    Eyes: Pupils are equal, round, and reactive to light. EOM are normal.    Neck: Normal range of motion.    Cardiovascular: Normal rate, regular rhythm and normal heart sounds.     Pulmonary/Chest: Effort normal and breath sounds normal. Right breast exhibits no inverted nipple, no mass, no nipple discharge, no skin change, no tenderness, no bleeding and no swelling. Left breast exhibits no inverted nipple, no mass, no nipple discharge, no skin change, no tenderness, no bleeding and no swelling. Breasts are symmetrical.        Abdominal: Soft. Bowel sounds are normal. She exhibits no distension. There is no tenderness.     Genitourinary: Vagina normal and uterus normal. Pelvic exam was performed with patient supine. There is no rash, tenderness, lesion or injury on the right labia. There is no rash, tenderness, lesion or injury on the left labia. Uterus is not deviated, not enlarged and not tender. Cervix is normal. Right adnexum displays no mass, no tenderness and no fullness. Left adnexum displays no mass, no tenderness and no fullness. No erythema, tenderness or bleeding in the vagina. No foreign body in the vagina. No signs of injury around the vagina. No vaginal discharge found. Cervix exhibits no motion tenderness, no discharge and no friability.           Musculoskeletal: Normal range of motion and moves all extremeties. She exhibits no edema or tenderness.       Neurological: She is alert and oriented to person, place, and time.    Skin: Skin is warm and dry.     Psychiatric: She has a normal mood and affect. Her behavior is normal. Thought content normal.          Assessment:        1. Well woman exam with routine gynecological exam    2. Menorrhagia with regular cycle             Plan:      Well woman exam with routine gynecological exam  -     Pt was counseled on cervical/vaginal screening guidelines and recommendations.  Last pap NILM on 2018.  As per current ASCCP guidelines, next pap is due 2021.  -     Pt was advised on current breast cancer screening recommendations.  Pt desires to proceed with breast exam today and screening MMG is scheduled.  -     Follow up with PCP for routine health maintenance needs.    Menorrhagia with regular cycle  -     drospirenone-ethinyl estradiol (VERONICA) 3-0.02 mg per tablet; Take 1 tablet by mouth once daily.  Dispense: 84 tablet; Refill: 4  -     Pt doing well with current OCP.  Desires to continue use.      Follow up in about 1 year (around 11/12/2020).

## 2019-12-02 ENCOUNTER — HOSPITAL ENCOUNTER (OUTPATIENT)
Dept: RADIOLOGY | Facility: HOSPITAL | Age: 49
Discharge: HOME | End: 2019-12-02
Attending: FAMILY MEDICINE
Payer: COMMERCIAL

## 2019-12-02 VITALS — WEIGHT: 169.31 LBS | BODY MASS INDEX: 28.91 KG/M2 | HEIGHT: 64 IN

## 2019-12-02 DIAGNOSIS — Z12.39 SCREENING FOR BREAST CANCER: ICD-10-CM

## 2019-12-02 DIAGNOSIS — Z00.00 PREVENTATIVE HEALTH CARE: ICD-10-CM

## 2019-12-02 PROCEDURE — 77063 BREAST TOMOSYNTHESIS BI: CPT | Mod: TC

## 2019-12-02 PROCEDURE — 77063 MAMMO DIGITAL SCREENING BILAT WITH TOMOSYNTHESIS_CAD: ICD-10-PCS | Mod: 26,,, | Performed by: RADIOLOGY

## 2019-12-02 PROCEDURE — 77063 BREAST TOMOSYNTHESIS BI: CPT | Mod: 26,,, | Performed by: RADIOLOGY

## 2019-12-02 PROCEDURE — 77067 MAMMO DIGITAL SCREENING BILAT WITH TOMOSYNTHESIS_CAD: ICD-10-PCS | Mod: 26,,, | Performed by: RADIOLOGY

## 2019-12-02 PROCEDURE — 77067 SCR MAMMO BI INCL CAD: CPT | Mod: 26,,, | Performed by: RADIOLOGY

## 2019-12-02 PROCEDURE — 77067 SCR MAMMO BI INCL CAD: CPT | Mod: TC

## 2019-12-03 NOTE — PROGRESS NOTES
Liana Mcfadden.I am happy to report that your recent breast imaging did NOT show evidence of cancer.An annual mammogram is the best test to screen for breast cancer, but it is not perfect, and it can miss some cancers. So, even though your mammogram was normal, if you notice any lump or change in one of your breasts, please schedule an appointment with me for a proper evaluation.Thank you for letting me care for you. I look forward to seeing you again.Sincerely,LATISHA Simmons MD

## 2019-12-10 ENCOUNTER — OFFICE VISIT (OUTPATIENT)
Dept: FAMILY MEDICINE | Facility: CLINIC | Age: 49
End: 2019-12-10
Payer: COMMERCIAL

## 2019-12-10 VITALS
HEIGHT: 64 IN | HEART RATE: 86 BPM | SYSTOLIC BLOOD PRESSURE: 117 MMHG | WEIGHT: 168.88 LBS | DIASTOLIC BLOOD PRESSURE: 70 MMHG | BODY MASS INDEX: 28.83 KG/M2

## 2019-12-10 DIAGNOSIS — N39.0 ACUTE UTI (URINARY TRACT INFECTION): Primary | ICD-10-CM

## 2019-12-10 DIAGNOSIS — R39.9 URINARY SYMPTOM OR SIGN: ICD-10-CM

## 2019-12-10 LAB
BILIRUB SERPL-MCNC: NORMAL MG/DL
BLOOD URINE, POC: 50
COLOR, POC UA: YELLOW
GLUCOSE UR QL STRIP: NORMAL
KETONES UR QL STRIP: NORMAL
LEUKOCYTE ESTERASE URINE, POC: NORMAL
NITRITE, POC UA: NORMAL
PH, POC UA: 7
PROTEIN, POC: NORMAL
SPECIFIC GRAVITY, POC UA: 1.01
UROBILINOGEN, POC UA: NORMAL

## 2019-12-10 PROCEDURE — 99999 PR PBB SHADOW E&M-EST. PATIENT-LVL III: CPT | Mod: PBBFAC,,, | Performed by: REGISTERED NURSE

## 2019-12-10 PROCEDURE — 99213 PR OFFICE/OUTPT VISIT, EST, LEVL III, 20-29 MIN: ICD-10-PCS | Mod: 25,S$GLB,, | Performed by: REGISTERED NURSE

## 2019-12-10 PROCEDURE — 87086 URINE CULTURE/COLONY COUNT: CPT

## 2019-12-10 PROCEDURE — 3008F PR BODY MASS INDEX (BMI) DOCUMENTED: ICD-10-PCS | Mod: CPTII,S$GLB,, | Performed by: REGISTERED NURSE

## 2019-12-10 PROCEDURE — 81002 URINALYSIS NONAUTO W/O SCOPE: CPT | Mod: S$GLB,,, | Performed by: REGISTERED NURSE

## 2019-12-10 PROCEDURE — 81002 POCT URINE DIPSTICK WITHOUT MICROSCOPE: ICD-10-PCS | Mod: S$GLB,,, | Performed by: REGISTERED NURSE

## 2019-12-10 PROCEDURE — 3008F BODY MASS INDEX DOCD: CPT | Mod: CPTII,S$GLB,, | Performed by: REGISTERED NURSE

## 2019-12-10 PROCEDURE — 99999 PR PBB SHADOW E&M-EST. PATIENT-LVL III: ICD-10-PCS | Mod: PBBFAC,,, | Performed by: REGISTERED NURSE

## 2019-12-10 PROCEDURE — 99213 OFFICE O/P EST LOW 20 MIN: CPT | Mod: 25,S$GLB,, | Performed by: REGISTERED NURSE

## 2019-12-10 RX ORDER — CIPROFLOXACIN 250 MG/1
250 TABLET, FILM COATED ORAL 2 TIMES DAILY
Qty: 6 TABLET | Refills: 0 | Status: SHIPPED | OUTPATIENT
Start: 2019-12-10 | End: 2019-12-13

## 2019-12-10 NOTE — PROGRESS NOTES
"Subjective:       Patient ID: Liana Keane is a 49 y.o. female.    Chief Complaint   Patient presents with    Urinary Tract Infection       Urinary Tract Infection    This is a new problem. The current episode started in the past 7 days. The problem has been waxing and waning. The quality of the pain is described as burning. There has been no fever. Associated symptoms include urgency and withholding. Pertinent negatives include no chills, frequency or hematuria. She has tried increased fluids for the symptoms. The treatment provided mild relief.       Review of Systems   Constitutional: Negative for chills and fever.   Genitourinary: Positive for dysuria, pelvic pain and urgency. Negative for frequency and hematuria.   Neurological: Negative.          Review of patient's allergies indicates:   Allergen Reactions    Adhesive Rash     bandaids    Neosporin [neomycin-bacitracin-polymyxin] Rash         Medication List with Changes/Refills   Current Medications    ALBUTEROL (PROAIR HFA) 90 MCG/ACTUATION INHALER    Inhale 2 puffs into the lungs every 8 (eight) hours as needed.    DROSPIRENONE-ETHINYL ESTRADIOL (VERONICA) 3-0.02 MG PER TABLET    Take 1 tablet by mouth once daily.    FLUTICASONE (FLONASE) 50 MCG/ACTUATION NASAL SPRAY    2 sprays (100 mcg total) by Each Nare route once daily.    MULTIVITAMIN-CA-IRON-MINERALS TAB    Take by mouth once daily.       Patient Active Problem List   Diagnosis    Seasonal allergic rhinitis due to pollen    Mild intermittent asthma without complication    Herpes simplex type II infection    Menorrhagia with regular cycle    Overweight         Past medical, surgical, family and social histories have been reviewed today.        Objective:     Vitals:    12/10/19 1418   BP: 117/70   Pulse: 86   Weight: 76.6 kg (168 lb 14 oz)   Height: 5' 4" (1.626 m)   PainSc: 0-No pain         Physical Exam   Constitutional: She is oriented to person, place, and time. She appears " well-developed and well-nourished.   Abdominal: There is no tenderness. There is no CVA tenderness.   Neurological: She is alert and oriented to person, place, and time.   Vitals reviewed.      Component      Latest Ref Rng & Units 12/10/2019   Color, UA       yellow   Spec Grav UA       1.015   pH, UA       7   WBC, UA       trace   Nitrite, UA       neg   Protein       neg   Glucose, UA       normal   Ketones, UA       neg   Urobilinogen, UA       normal   Bilirubin       +   RBC, UA       50       Diagnosis       1. Acute UTI (urinary tract infection)    2. Urinary symptom or sign          Assessment/ Plan     Acute UTI (urinary tract infection)  -     ciprofloxacin HCl (CIPRO) 250 MG tablet; Take 1 tablet (250 mg total) by mouth 2 (two) times daily. for 3 days  Dispense: 6 tablet; Refill: 0  -     Urine culture    Urinary symptom or sign  -     POCT urine dipstick without microscope        Urine culture pending.  Infection triggers & prevention discussed.  Follow-up in clinic as needed.      Patient Care Team:  LATISHA Simmons MD as PCP - General (Family Medicine)  Elliott Benavides LPN as Care Coordinator (Internal Medicine)      PARAMJIT Foley  [facility]

## 2019-12-12 LAB — BACTERIA UR CULT: NO GROWTH

## 2020-01-03 ENCOUNTER — OFFICE VISIT (OUTPATIENT)
Dept: INTERNAL MEDICINE | Facility: CLINIC | Age: 50
End: 2020-01-03
Payer: COMMERCIAL

## 2020-01-03 VITALS
OXYGEN SATURATION: 99 % | HEART RATE: 98 BPM | HEIGHT: 64 IN | SYSTOLIC BLOOD PRESSURE: 136 MMHG | WEIGHT: 167.31 LBS | DIASTOLIC BLOOD PRESSURE: 86 MMHG | TEMPERATURE: 98 F | BODY MASS INDEX: 28.56 KG/M2

## 2020-01-03 DIAGNOSIS — Z23 NEED FOR INFLUENZA VACCINATION: ICD-10-CM

## 2020-01-03 DIAGNOSIS — J45.21 MILD INTERMITTENT ASTHMA WITH ACUTE EXACERBATION: Primary | ICD-10-CM

## 2020-01-03 DIAGNOSIS — J30.1 SEASONAL ALLERGIC RHINITIS DUE TO POLLEN: ICD-10-CM

## 2020-01-03 PROCEDURE — 3008F PR BODY MASS INDEX (BMI) DOCUMENTED: ICD-10-PCS | Mod: CPTII,S$GLB,, | Performed by: FAMILY MEDICINE

## 2020-01-03 PROCEDURE — 99214 OFFICE O/P EST MOD 30 MIN: CPT | Mod: S$GLB,,, | Performed by: FAMILY MEDICINE

## 2020-01-03 PROCEDURE — 99999 PR PBB SHADOW E&M-EST. PATIENT-LVL III: ICD-10-PCS | Mod: PBBFAC,,, | Performed by: FAMILY MEDICINE

## 2020-01-03 PROCEDURE — 99214 PR OFFICE/OUTPT VISIT, EST, LEVL IV, 30-39 MIN: ICD-10-PCS | Mod: S$GLB,,, | Performed by: FAMILY MEDICINE

## 2020-01-03 PROCEDURE — 3008F BODY MASS INDEX DOCD: CPT | Mod: CPTII,S$GLB,, | Performed by: FAMILY MEDICINE

## 2020-01-03 PROCEDURE — 99999 PR PBB SHADOW E&M-EST. PATIENT-LVL III: CPT | Mod: PBBFAC,,, | Performed by: FAMILY MEDICINE

## 2020-01-03 RX ORDER — PREDNISONE 20 MG/1
40 TABLET ORAL DAILY
Qty: 8 TABLET | Refills: 0 | Status: SHIPPED | OUTPATIENT
Start: 2020-01-03 | End: 2020-01-07

## 2020-01-03 NOTE — PROGRESS NOTES
CHIEF COMPLAINT  Asthma      HISTORY, ASSESSMENT, and PLAN    1. Mild intermittent asthma with acute exacerbation        ASSESSMENT:  Onset reported as over the last several days.  Quality described as wheezing and shortness of breath.  Severity described as mild-moderate at worst.  Alleviating factors include albuterol inhaler.  Timing of symptoms described as improving, with no wheezing at present.  She reports no exertional chest discomfort/tightness.  She reports no significant cough.  She says that prior to this exacerbation, she very rarely needs/uses albuterol. We discussed risks and benefits of treatment options, including use of inhaled corticosteroid-LABA on an as needed basis for exacerbations such as this.    2. Seasonal allergic rhinitis due to pollen        ASSESSMENT:  Mild worsening with increased rhinorrhea.    3. Need for influenza vaccination        ASSESSMENT: I strongly recommended.  She gave informed refusal.      Orders Placed This Encounter    predniSONE (DELTASONE) 20 MG tablet        Medication List with Changes/Refills   New Medications    PREDNISONE (DELTASONE) 20 MG TABLET    Take 2 tablets (40 mg total) by mouth once daily. for 4 days   Current Medications    ALBUTEROL (PROAIR HFA) 90 MCG/ACTUATION INHALER    Inhale 2 puffs into the lungs every 8 (eight) hours as needed.    DROSPIRENONE-ETHINYL ESTRADIOL (VERONICA) 3-0.02 MG PER TABLET    Take 1 tablet by mouth once daily.    FLUTICASONE (FLONASE) 50 MCG/ACTUATION NASAL SPRAY    2 sprays (100 mcg total) by Each Nare route once daily.    MULTIVITAMIN-CA-IRON-MINERALS TAB    Take by mouth once daily.       Follow up if symptoms worsen or fail to improve.    Problem List Items Addressed This Visit        Pulmonary    Mild intermittent asthma without complication - Primary (Chronic)    Relevant Medications    predniSONE (DELTASONE) 20 MG tablet       Other    Seasonal allergic rhinitis due to pollen    Relevant Medications    predniSONE  "(DELTASONE) 20 MG tablet      Other Visit Diagnoses     Need for influenza vaccination               REVIEW OF SYSTEMS  Answers for HPI/ROS submitted by the patient on 1/3/2020    activity change: No  unexpected weight change: No  neck pain: No  rhinorrhea: No  trouble swallowing: No  eye discharge: No  chest pain: No  palpitations: No  blood in stool: No  constipation: No  vomiting: No  diarrhea: No  polydipsia: No  polyuria: No  difficulty urinating: No  hematuria: No  menstrual problem: No  dysuria: No  joint swelling: No  arthralgias: No  headaches: No  weakness: No  confusion: No  dysphoric mood: No    PHYSICAL EXAM  Vitals:    01/03/20 1452   BP: 136/86   BP Location: Left arm   Patient Position: Sitting   BP Method: Medium (Manual)   Pulse: 98   Temp: 98.1 °F (36.7 °C)   TempSrc: Oral   SpO2: 99%   Weight: 75.9 kg (167 lb 5.3 oz)   Height: 5' 4" (1.626 m)     CONSTITUTIONAL: Vital signs noted. No apparent distress. Does not appear acutely ill or septic. Appears adequately hydrated.  EYES: Pupils equal and reactive. Extraocular movements intact. Sclerae anicteric. Lids and conjunctiva unremarkable.  ENT: External ENT grossly unremarkable. Ear canals and visualized tympanic membranes are unremarkable. Hearing grossly intact. Nasal mucosa pink. Oropharynx moist. Posterior oropharynx is reasonably symmetric with mild erythema, but is without lesion, ulceration, or exudate.  NECK: Trachea midline. No significant cervical lymphadenopathy.  PULM: Lungs clear. Breathing unlabored.  HEART: Auscultation reveals regular rate and rhythm.  DERM: Skin warm and moist with normal turgor.     Documentation entered by me for this encounter may have been done in part using speech-recognition technology. Although I have made an effort to ensure accuracy, "sound like" errors may exist and should be interpreted in context. -LATISHA Simmons MD.   "

## 2020-02-26 ENCOUNTER — OFFICE VISIT (OUTPATIENT)
Dept: FAMILY MEDICINE | Facility: CLINIC | Age: 50
End: 2020-02-26
Payer: COMMERCIAL

## 2020-02-26 VITALS
TEMPERATURE: 98 F | WEIGHT: 170 LBS | OXYGEN SATURATION: 99 % | HEART RATE: 113 BPM | DIASTOLIC BLOOD PRESSURE: 77 MMHG | RESPIRATION RATE: 18 BRPM | SYSTOLIC BLOOD PRESSURE: 138 MMHG | HEIGHT: 64 IN | BODY MASS INDEX: 29.02 KG/M2

## 2020-02-26 DIAGNOSIS — J30.9 ALLERGIC RHINITIS, UNSPECIFIED SEASONALITY, UNSPECIFIED TRIGGER: Primary | ICD-10-CM

## 2020-02-26 PROCEDURE — 3008F BODY MASS INDEX DOCD: CPT | Mod: CPTII,S$GLB,, | Performed by: REGISTERED NURSE

## 2020-02-26 PROCEDURE — 96372 THER/PROPH/DIAG INJ SC/IM: CPT | Mod: S$GLB,,, | Performed by: REGISTERED NURSE

## 2020-02-26 PROCEDURE — 99213 PR OFFICE/OUTPT VISIT, EST, LEVL III, 20-29 MIN: ICD-10-PCS | Mod: 25,S$GLB,, | Performed by: REGISTERED NURSE

## 2020-02-26 PROCEDURE — 96372 PR INJECTION,THERAP/PROPH/DIAG2ST, IM OR SUBCUT: ICD-10-PCS | Mod: S$GLB,,, | Performed by: REGISTERED NURSE

## 2020-02-26 PROCEDURE — 99999 PR PBB SHADOW E&M-EST. PATIENT-LVL III: CPT | Mod: PBBFAC,,, | Performed by: REGISTERED NURSE

## 2020-02-26 PROCEDURE — 99999 PR PBB SHADOW E&M-EST. PATIENT-LVL III: ICD-10-PCS | Mod: PBBFAC,,, | Performed by: REGISTERED NURSE

## 2020-02-26 PROCEDURE — 99213 OFFICE O/P EST LOW 20 MIN: CPT | Mod: 25,S$GLB,, | Performed by: REGISTERED NURSE

## 2020-02-26 PROCEDURE — 3008F PR BODY MASS INDEX (BMI) DOCUMENTED: ICD-10-PCS | Mod: CPTII,S$GLB,, | Performed by: REGISTERED NURSE

## 2020-02-26 RX ORDER — BETAMETHASONE SODIUM PHOSPHATE AND BETAMETHASONE ACETATE 3; 3 MG/ML; MG/ML
12 INJECTION, SUSPENSION INTRA-ARTICULAR; INTRALESIONAL; INTRAMUSCULAR; SOFT TISSUE
Status: COMPLETED | OUTPATIENT
Start: 2020-02-26 | End: 2020-02-26

## 2020-02-26 RX ADMIN — BETAMETHASONE SODIUM PHOSPHATE AND BETAMETHASONE ACETATE 12 MG: 3; 3 INJECTION, SUSPENSION INTRA-ARTICULAR; INTRALESIONAL; INTRAMUSCULAR; SOFT TISSUE at 03:02

## 2020-02-26 NOTE — LETTER
February 26, 2020    Liana Keane  [address]  [place] [address]             [facility]  Family Medicine  [address] [place] [address]  Phone: [phone]  Fax: [phone]   February 26, 2020     Patient: Liana Keane   YOB: 1970   Date of Visit: 2/26/2020       To Whom it May Concern:    Liana Keane was seen in my clinic on 2/26/2020. She may return to work on 02/27/2020.    Please excuse her from any classes or work missed.    If you have any questions or concerns, please don't hesitate to call.    Sincerely,         Carlos Rivera NP

## 2020-02-26 NOTE — PROGRESS NOTES
Subjective:       Patient ID: Liana Keane is a 49 y.o. female.    Chief Complaint   Patient presents with    Sinus Problem       HPI    Liana Keane is here today with c/o sinus issues x a few days, h/o allergies.  Reports s/s worse this morning around 2 am with increased sneezing and clear RN.  Does c/o eye pain, NC, sore throat and PND.  No cough.  OTC med not helping.        Review of Systems   Constitutional: Negative for chills, diaphoresis, fatigue and fever.   HENT: Positive for congestion, postnasal drip, rhinorrhea, sneezing and sore throat. Negative for ear pain, trouble swallowing and voice change.    Eyes: Positive for pain. Negative for discharge and itching.   Respiratory: Negative for cough, shortness of breath and wheezing.    Cardiovascular: Negative for chest pain, palpitations and leg swelling.   Gastrointestinal: Negative for abdominal pain, nausea and vomiting.   Musculoskeletal: Negative.    Skin: Negative.    Allergic/Immunologic: Positive for environmental allergies.   Neurological: Positive for headaches. Negative for weakness, light-headedness and numbness.   Hematological: Negative for adenopathy.         Review of patient's allergies indicates:   Allergen Reactions    Adhesive Rash     bandaids    Neosporin [neomycin-bacitracin-polymyxin] Rash         Patient Active Problem List   Diagnosis    Seasonal allergic rhinitis due to pollen    Mild intermittent asthma without complication    Herpes simplex type II infection    Menorrhagia with regular cycle    Overweight       Medication List with Changes/Refills   Current Medications    ALBUTEROL (PROAIR HFA) 90 MCG/ACTUATION INHALER    Inhale 2 puffs into the lungs every 8 (eight) hours as needed.    DROSPIRENONE-ETHINYL ESTRADIOL (VERONICA) 3-0.02 MG PER TABLET    Take 1 tablet by mouth once daily.    FLUTICASONE (FLONASE) 50 MCG/ACTUATION NASAL SPRAY    2 sprays (100 mcg total) by Each Nare route once daily.     "MULTIVITAMIN-CA-IRON-MINERALS TAB    Take by mouth once daily.             Past medical, surgical, family and social histories have been reviewed today.        Objective:     Vitals:    02/26/20 1513   BP: 138/77   Pulse: (!) 113   Resp: 18   Temp: 98.1 °F (36.7 °C)   TempSrc: Oral   SpO2: 99%   Weight: 77.1 kg (169 lb 15.6 oz)   Height: 5' 4" (1.626 m)   PainSc:   8   PainLoc: Head       Estimated body mass index is 28.72 kg/m² as calculated from the following:    Height as of 1/3/20: 5' 4" (1.626 m).    Weight as of 1/3/20: 75.9 kg (167 lb 5.3 oz).      Physical Exam   Constitutional: She is oriented to person, place, and time. She appears well-developed and well-nourished.   HENT:   Head: Normocephalic and atraumatic.   Right Ear: Tympanic membrane normal.   Left Ear: Tympanic membrane normal.   Nose: Mucosal edema and rhinorrhea (boggy//clear RN) present. Right sinus exhibits no maxillary sinus tenderness and no frontal sinus tenderness. Left sinus exhibits no maxillary sinus tenderness and no frontal sinus tenderness.   Mouth/Throat: Mucous membranes are normal. No oropharyngeal exudate, posterior oropharyngeal edema (clear PND noted) or posterior oropharyngeal erythema.   Eyes: Pupils are equal, round, and reactive to light. Conjunctivae are normal. Right eye exhibits no discharge. Left eye exhibits no discharge.   Cardiovascular: Regular rhythm. Tachycardia present.   Pulmonary/Chest: Effort normal and breath sounds normal.   Musculoskeletal: Normal range of motion. She exhibits no edema.   Lymphadenopathy:     She has no cervical adenopathy.   Neurological: She is alert and oriented to person, place, and time.   Skin: Skin is warm and dry. Capillary refill takes less than 2 seconds. No rash noted.   Psychiatric: She has a normal mood and affect. Her behavior is normal. Judgment and thought content normal.   Vitals reviewed.        Diagnosis       1. Allergic rhinitis, unspecified seasonality, unspecified " trigger          Assessment/ Plan     Allergic rhinitis, unspecified seasonality, unspecified trigger  -     betamethasone acetate-betamethasone sodium phosphate injection 12 mg      Injection today.  Claritin once daily or other anti-histamine of choice.  Flonase.  Symptomatic care, rest and fluids.  Follow-up in clinic as needed.          Patient Care Team:  LATISHA Simmons MD as PCP - General (Family Medicine)  Elliott Benavides LPN as Care Coordinator (Internal Medicine)      PARAMJIT Foley  [facility]

## 2020-10-12 ENCOUNTER — OFFICE VISIT (OUTPATIENT)
Dept: INTERNAL MEDICINE | Facility: CLINIC | Age: 50
End: 2020-10-12
Payer: COMMERCIAL

## 2020-10-12 VITALS
OXYGEN SATURATION: 98 % | WEIGHT: 164 LBS | HEIGHT: 64 IN | BODY MASS INDEX: 28 KG/M2 | SYSTOLIC BLOOD PRESSURE: 136 MMHG | RESPIRATION RATE: 16 BRPM | HEART RATE: 82 BPM | DIASTOLIC BLOOD PRESSURE: 76 MMHG | TEMPERATURE: 98 F

## 2020-10-12 DIAGNOSIS — M79.604 RIGHT LEG PAIN: Primary | ICD-10-CM

## 2020-10-12 PROCEDURE — 99999 PR PBB SHADOW E&M-EST. PATIENT-LVL IV: ICD-10-PCS | Mod: PBBFAC,,, | Performed by: FAMILY MEDICINE

## 2020-10-12 PROCEDURE — 99214 PR OFFICE/OUTPT VISIT, EST, LEVL IV, 30-39 MIN: ICD-10-PCS | Mod: S$GLB,,, | Performed by: FAMILY MEDICINE

## 2020-10-12 PROCEDURE — 99214 OFFICE O/P EST MOD 30 MIN: CPT | Mod: S$GLB,,, | Performed by: FAMILY MEDICINE

## 2020-10-12 PROCEDURE — 99999 PR PBB SHADOW E&M-EST. PATIENT-LVL IV: CPT | Mod: PBBFAC,,, | Performed by: FAMILY MEDICINE

## 2020-10-12 PROCEDURE — 3008F PR BODY MASS INDEX (BMI) DOCUMENTED: ICD-10-PCS | Mod: CPTII,S$GLB,, | Performed by: FAMILY MEDICINE

## 2020-10-12 PROCEDURE — 3008F BODY MASS INDEX DOCD: CPT | Mod: CPTII,S$GLB,, | Performed by: FAMILY MEDICINE

## 2020-10-12 RX ORDER — METHOCARBAMOL 750 MG/1
750 TABLET, FILM COATED ORAL 3 TIMES DAILY PRN
Qty: 30 TABLET | Refills: 0 | Status: SHIPPED | OUTPATIENT
Start: 2020-10-12 | End: 2020-10-22

## 2020-10-12 RX ORDER — NAPROXEN 500 MG/1
500 TABLET ORAL 2 TIMES DAILY PRN
Qty: 30 TABLET | Refills: 0 | Status: SHIPPED | OUTPATIENT
Start: 2020-10-12 | End: 2021-03-25

## 2020-10-12 NOTE — PROGRESS NOTES
Subjective:       Patient ID: Liana Keane is a 50 y.o. female.    Chief Complaint: Leg Pain (Right)    50-year-old  female patient of Dr. Fernandez Simmons with Patient Active Problem List:     Seasonal allergic rhinitis due to pollen     Mild intermittent asthma without complication     Herpes simplex type II infection     Menorrhagia with regular cycle     Overweight  Reports that she has been having right leg throbbing pain up to 9/10 for the past 3 weeks, denies any trouble with lower back, tingling or numbness sensation to extremities, no recent falls or injuries reported.  Patient works for [employer] and reports standing all day.  Exercises regularly with walking but denies any lifting heavy weights to provoke this episode, has not tried taking any medication.   Reports going through anxiety for which she has been getting counseling    Review of Systems   Constitutional: Negative for activity change, fatigue and unexpected weight change.   HENT: Negative for hearing loss, rhinorrhea and trouble swallowing.    Eyes: Negative for discharge and visual disturbance.   Respiratory: Negative for chest tightness, shortness of breath and wheezing.    Cardiovascular: Negative for chest pain, palpitations and leg swelling.   Gastrointestinal: Negative for abdominal pain, blood in stool, constipation, diarrhea, nausea and vomiting.   Endocrine: Negative for polydipsia and polyuria.   Genitourinary: Negative for difficulty urinating, dysuria, hematuria and menstrual problem.   Musculoskeletal: Positive for arthralgias and myalgias. Negative for back pain, gait problem, joint swelling and neck pain.   Skin: Negative for rash.   Neurological: Negative for weakness, light-headedness, numbness and headaches.   Psychiatric/Behavioral: Negative for confusion, dysphoric mood and sleep disturbance. The patient is nervous/anxious.          /76 (BP Location: Right arm, Patient Position: Sitting, BP  "Method: Medium (Manual))   Pulse 82   Temp 97.5 °F (36.4 °C) (Temporal)   Resp 16   Ht 5' 4" (1.626 m)   Wt 74.4 kg (164 lb 0.4 oz)   SpO2 98%   BMI 28.15 kg/m²   Objective:      Physical Exam  Constitutional:       Appearance: She is well-developed.   HENT:      Head: Normocephalic and atraumatic.   Cardiovascular:      Rate and Rhythm: Normal rate and regular rhythm.      Heart sounds: Normal heart sounds. No murmur.   Pulmonary:      Effort: Pulmonary effort is normal.      Breath sounds: Normal breath sounds. No wheezing.   Abdominal:      General: Bowel sounds are normal.      Palpations: Abdomen is soft.      Tenderness: There is no abdominal tenderness.   Musculoskeletal:         General: No swelling, tenderness, deformity or signs of injury.      Right lower leg: No edema.      Left lower leg: No edema.      Comments: No significant tenderness noted to the right thigh on palpation.   No point tenderness noted to the right hip or paraspinal lumbar muscle tenderness on exam today   Skin:     General: Skin is warm and dry.      Findings: No rash.   Neurological:      Mental Status: She is alert and oriented to person, place, and time.   Psychiatric:         Mood and Affect: Mood normal.           Assessment/Plan:   1. Right leg pain  - naproxen (NAPROSYN) 500 MG tablet; Take 1 tablet (500 mg total) by mouth 2 (two) times daily as needed.  Dispense: 30 tablet; Refill: 0  - methocarbamoL (ROBAXIN) 750 MG Tab; Take 1 tablet (750 mg total) by mouth 3 (three) times daily as needed.  Dispense: 30 tablet; Refill: 0    Naprosyn and Robaxin prescribed today for symptomatic relief Warm compresses recommended encouraged to elevate lower extremities if symptoms continue to persist may consider physical therapy           "

## 2020-10-27 ENCOUNTER — OFFICE VISIT (OUTPATIENT)
Dept: FAMILY MEDICINE | Facility: CLINIC | Age: 50
End: 2020-10-27
Payer: COMMERCIAL

## 2020-10-27 VITALS
HEIGHT: 64 IN | TEMPERATURE: 98 F | DIASTOLIC BLOOD PRESSURE: 78 MMHG | SYSTOLIC BLOOD PRESSURE: 122 MMHG | WEIGHT: 163.13 LBS | BODY MASS INDEX: 27.85 KG/M2 | HEART RATE: 92 BPM

## 2020-10-27 DIAGNOSIS — B37.31 VAGINAL YEAST INFECTION: Primary | ICD-10-CM

## 2020-10-27 PROCEDURE — 99999 PR PBB SHADOW E&M-EST. PATIENT-LVL III: ICD-10-PCS | Mod: PBBFAC,,, | Performed by: REGISTERED NURSE

## 2020-10-27 PROCEDURE — 99213 OFFICE O/P EST LOW 20 MIN: CPT | Mod: S$GLB,,, | Performed by: REGISTERED NURSE

## 2020-10-27 PROCEDURE — 99999 PR PBB SHADOW E&M-EST. PATIENT-LVL III: CPT | Mod: PBBFAC,,, | Performed by: REGISTERED NURSE

## 2020-10-27 PROCEDURE — 99213 PR OFFICE/OUTPT VISIT, EST, LEVL III, 20-29 MIN: ICD-10-PCS | Mod: S$GLB,,, | Performed by: REGISTERED NURSE

## 2020-10-27 PROCEDURE — 3008F BODY MASS INDEX DOCD: CPT | Mod: CPTII,S$GLB,, | Performed by: REGISTERED NURSE

## 2020-10-27 PROCEDURE — 3008F PR BODY MASS INDEX (BMI) DOCUMENTED: ICD-10-PCS | Mod: CPTII,S$GLB,, | Performed by: REGISTERED NURSE

## 2020-10-27 RX ORDER — FLUCONAZOLE 150 MG/1
TABLET ORAL
Qty: 2 TABLET | Refills: 0 | Status: SHIPPED | OUTPATIENT
Start: 2020-10-27 | End: 2020-11-16

## 2020-10-27 NOTE — PROGRESS NOTES
Subjective:       Patient ID: Liana Keane is a 50 y.o. female.    Chief Complaint   Patient presents with    Vaginal discharge       HPI    Vaginitis  Patient presents for evaluation of an abnormal vaginal discharge.   Symptoms have been present for 5 days.   Vaginal symptoms: discharge described as white, curd-like and odorless, irritation  Denies abnormal bleeding, lesions and urinary symptoms of chills, cloudy urine, flank pain bilaterally, hematuria, nausea, sweats and urinary frequency.   Sexually transmitted infection risk: very low risk of STD exposure.   Menstrual flow: regular every 28-30 days.    She has tried over the counter treatments --- Monostat helped ease up s/s some  She denies any abnormal vaginal bleeding.      Review of Systems   Constitutional: Negative.    Genitourinary: Positive for dysuria, vaginal discharge and vaginal pain (more of irritation). Negative for flank pain, frequency, genital sores, hematuria and menstrual problem.   Neurological: Negative.          Review of patient's allergies indicates:   Allergen Reactions    Adhesive Rash     bandaids    Neosporin [neomycin-bacitracin-polymyxin] Rash         Patient Active Problem List   Diagnosis    Seasonal allergic rhinitis due to pollen    Mild intermittent asthma without complication    Herpes simplex type II infection    Menorrhagia with regular cycle    Overweight           Current Outpatient Medications:     albuterol (PROAIR HFA) 90 mcg/actuation inhaler, Inhale 2 puffs into the lungs every 8 (eight) hours as needed., Disp: 18 g, Rfl: 1    drospirenone-ethinyl estradiol (VERONICA) 3-0.02 mg per tablet, Take 1 tablet by mouth once daily., Disp: 84 tablet, Rfl: 4    fluticasone (FLONASE) 50 mcg/actuation nasal spray, 2 sprays (100 mcg total) by Each Nare route once daily., Disp: 15.8 mL, Rfl: 1    multivitamin-Ca-iron-minerals Tab, Take by mouth once daily., Disp: , Rfl:     naproxen (NAPROSYN) 500 MG tablet, Take 1  tablet (500 mg total) by mouth 2 (two) times daily as needed., Disp: 30 tablet, Rfl: 0        Past medical, surgical, family and social histories have been reviewed today.        Objective:     Vitals:    10/27/20 0802   BP: 122/78   Pulse: 92   Temp: 98.1 °F (36.7 °C)         General:   No distress   Pelvic:  Exam deferred, currently on cycle           Diagnosis       1. Vaginal yeast infection          Assessment/ Plan     Vaginal yeast infection ---- acute onset of yeast infection, possibly triggered by menses.  Infection triggers and prevention.  -     fluconazole (DIFLUCAN) 150 MG Tab; Take 1 tab by mouth now, then repeat dose when menses is completed  Dispense: 2 tablet; Refill: 0        Follow-up:  Return prn.        PARAMJIT Foley  [facility]

## 2020-11-16 ENCOUNTER — OFFICE VISIT (OUTPATIENT)
Dept: OBSTETRICS AND GYNECOLOGY | Facility: CLINIC | Age: 50
End: 2020-11-16
Payer: COMMERCIAL

## 2020-11-16 VITALS
HEIGHT: 64 IN | BODY MASS INDEX: 28.6 KG/M2 | WEIGHT: 167.56 LBS | SYSTOLIC BLOOD PRESSURE: 120 MMHG | DIASTOLIC BLOOD PRESSURE: 72 MMHG

## 2020-11-16 DIAGNOSIS — N76.0 BV (BACTERIAL VAGINOSIS): ICD-10-CM

## 2020-11-16 DIAGNOSIS — Z01.419 WELL WOMAN EXAM WITH ROUTINE GYNECOLOGICAL EXAM: Primary | ICD-10-CM

## 2020-11-16 DIAGNOSIS — N92.0 MENORRHAGIA WITH REGULAR CYCLE: ICD-10-CM

## 2020-11-16 DIAGNOSIS — B96.89 BV (BACTERIAL VAGINOSIS): ICD-10-CM

## 2020-11-16 PROCEDURE — 1126F PR PAIN SEVERITY QUANTIFIED, NO PAIN PRESENT: ICD-10-PCS | Mod: S$GLB,,, | Performed by: OBSTETRICS & GYNECOLOGY

## 2020-11-16 PROCEDURE — 99396 PR PREVENTIVE VISIT,EST,40-64: ICD-10-PCS | Mod: S$GLB,,, | Performed by: OBSTETRICS & GYNECOLOGY

## 2020-11-16 PROCEDURE — 99999 PR PBB SHADOW E&M-EST. PATIENT-LVL III: CPT | Mod: PBBFAC,,, | Performed by: OBSTETRICS & GYNECOLOGY

## 2020-11-16 PROCEDURE — 87210 SMEAR WET MOUNT SALINE/INK: CPT | Mod: QW,S$GLB,, | Performed by: OBSTETRICS & GYNECOLOGY

## 2020-11-16 PROCEDURE — 99999 PR PBB SHADOW E&M-EST. PATIENT-LVL III: ICD-10-PCS | Mod: PBBFAC,,, | Performed by: OBSTETRICS & GYNECOLOGY

## 2020-11-16 PROCEDURE — 99396 PREV VISIT EST AGE 40-64: CPT | Mod: S$GLB,,, | Performed by: OBSTETRICS & GYNECOLOGY

## 2020-11-16 PROCEDURE — 3008F PR BODY MASS INDEX (BMI) DOCUMENTED: ICD-10-PCS | Mod: CPTII,S$GLB,, | Performed by: OBSTETRICS & GYNECOLOGY

## 2020-11-16 PROCEDURE — 3008F BODY MASS INDEX DOCD: CPT | Mod: CPTII,S$GLB,, | Performed by: OBSTETRICS & GYNECOLOGY

## 2020-11-16 PROCEDURE — 87210 PR  SMEAR,STAIN,WET MNT,INTERP: ICD-10-PCS | Mod: QW,S$GLB,, | Performed by: OBSTETRICS & GYNECOLOGY

## 2020-11-16 PROCEDURE — 1126F AMNT PAIN NOTED NONE PRSNT: CPT | Mod: S$GLB,,, | Performed by: OBSTETRICS & GYNECOLOGY

## 2020-11-16 RX ORDER — METRONIDAZOLE 500 MG/1
500 TABLET ORAL 2 TIMES DAILY
Qty: 14 TABLET | Refills: 0 | Status: SHIPPED | OUTPATIENT
Start: 2020-11-16 | End: 2020-11-23

## 2020-11-16 NOTE — PROGRESS NOTES
Subjective:       Patient ID: Liana Keane is a 50 y.o. female.    Chief Complaint:  Annual Exam and Vaginitis      History of Present Illness  HPI  Annual Exam-Premenopausal  Patient presents for annual exam. The patient reports that she had a yeast infection treated last month and still has a residual discharge.  Denies any other symptoms. The patient is sexually active. GYN screening history: last pap: approximate date  and was normal and last mammogram: approximate date 2019 and was normal. The patient wears seatbelts: yes. The patient participates in regular exercise: yes. Has the patient ever been transfused or tattooed?: no. The patient reports that there is not domestic violence in her life.  Menses are regular on OCP.  Pt would like to discuss treatment options (in particular, stopping OCP).        GYN & OB History  Patient's last menstrual period was 10/26/2020.   Date of Last Pap: No result found    OB History    Para Term  AB Living   4 2 2   2 2   SAB TAB Ectopic Multiple Live Births   1 1     1      # Outcome Date GA Lbr Shahram/2nd Weight Sex Delivery Anes PTL Lv   4 TAB            3 SAB            2 Term     M Vag-Spont      1 Term     M Vag-Spont   BALDOMERO       Review of Systems  Review of Systems   Constitutional: Negative for activity change, appetite change, chills, fatigue, fever and unexpected weight change.   Respiratory: Negative for shortness of breath.    Cardiovascular: Negative for chest pain, palpitations and leg swelling.   Gastrointestinal: Negative for abdominal pain, bloating, blood in stool, constipation, diarrhea, nausea and vomiting.   Genitourinary: Positive for vaginal discharge. Negative for dysmenorrhea, dyspareunia, dysuria, flank pain, frequency, genital sores, hematuria, menorrhagia, menstrual problem, pelvic pain, urgency, vaginal bleeding, vaginal pain, urinary incontinence, postcoital bleeding, vaginal dryness and vaginal odor.   Musculoskeletal:  Negative for back pain.   Integumentary:  Negative for breast mass, nipple discharge, breast skin changes and breast tenderness.   Neurological: Negative for syncope and headaches.   Breast: Negative for asymmetry, lump, mass, mastodynia, nipple discharge, skin changes and tenderness          Objective:    Physical Exam:   Constitutional: She is oriented to person, place, and time. She appears well-developed and well-nourished. No distress.    HENT:   Head: Normocephalic and atraumatic.    Eyes: Pupils are equal, round, and reactive to light. EOM are normal.    Neck: Normal range of motion.    Cardiovascular: Normal rate, regular rhythm and normal heart sounds.     Pulmonary/Chest: Effort normal and breath sounds normal.        Abdominal: Soft. Bowel sounds are normal. She exhibits no distension. There is no abdominal tenderness.     Genitourinary:    Uterus normal.      Pelvic exam was performed with patient supine.   There is no rash, tenderness, lesion or injury on the right labia. There is no rash, tenderness, lesion or injury on the left labia. Uterus is not deviated, not enlarged and not tender. Cervix is normal. Right adnexum displays no mass, no tenderness and no fullness. Left adnexum displays no mass, no tenderness and no fullness. No erythema, tenderness or bleeding in the vagina.    No foreign body in the vagina.      No signs of injury in the vagina.   Cervix exhibits no motion tenderness, no discharge and no friability.    Genitourinary Comments: Wet prep: many clue cells; negative for yeast or trichomonas   positive for vaginal discharge          Musculoskeletal: Normal range of motion and moves all extremeties. No tenderness or edema.       Neurological: She is alert and oriented to person, place, and time.    Skin: Skin is warm and dry.    Psychiatric: She has a normal mood and affect. Her behavior is normal. Thought content normal.          Assessment:        1. Well woman exam with routine  gynecological exam    2. Menorrhagia with regular cycle    3. BV (bacterial vaginosis)             Plan:      Well woman exam with routine gynecological exam  -     Mammo Digital Screening Bilat; Future; Expected date: 11/16/2020  -     Pt was counseled on cervical/vaginal screening guidelines and recommendations.  Last pap NILM on 2018.  As per current ASCCP guidelines, next pap is due 2021.  -     Pt was advised on current breast cancer screening recommendations.  Pt desires to proceed with breast exam today and screening MMG.  -     Follow up with PCP for routine health maintenance needs.    Menorrhagia with regular cycle  -     Well controlled on OCP.  Pt has been on this medication for 4-5 yrs.  Pt was counseled on treatment options.  Desires to discontinue OCP and monitor progress of symptoms.    BV (bacterial vaginosis)  -     POCT Wet Prep  -     metroNIDAZOLE (FLAGYL) 500 MG tablet; Take 1 tablet (500 mg total) by mouth 2 (two) times daily. for 7 days  Dispense: 14 tablet; Refill: 0  -     Medication details, dosing, risks, side-effects, and interactions were discussed.      Follow up in about 1 year (around 11/16/2021).

## 2020-11-16 NOTE — PATIENT INSTRUCTIONS

## 2020-11-17 DIAGNOSIS — Z12.11 SPECIAL SCREENING FOR MALIGNANT NEOPLASMS, COLON: Primary | ICD-10-CM

## 2020-12-16 ENCOUNTER — HOSPITAL ENCOUNTER (OUTPATIENT)
Dept: RADIOLOGY | Facility: HOSPITAL | Age: 50
Discharge: HOME | End: 2020-12-16
Attending: OBSTETRICS & GYNECOLOGY
Payer: COMMERCIAL

## 2020-12-16 VITALS — WEIGHT: 167.56 LBS | HEIGHT: 64 IN | BODY MASS INDEX: 28.6 KG/M2

## 2020-12-16 DIAGNOSIS — Z01.419 WELL WOMAN EXAM WITH ROUTINE GYNECOLOGICAL EXAM: ICD-10-CM

## 2020-12-16 PROCEDURE — 77067 SCR MAMMO BI INCL CAD: CPT | Mod: 26,,, | Performed by: RADIOLOGY

## 2020-12-16 PROCEDURE — 77067 SCR MAMMO BI INCL CAD: CPT | Mod: TC

## 2020-12-16 PROCEDURE — 77063 BREAST TOMOSYNTHESIS BI: CPT | Mod: 26,,, | Performed by: RADIOLOGY

## 2020-12-16 PROCEDURE — 77067 MAMMO DIGITAL SCREENING BILAT WITH TOMO: ICD-10-PCS | Mod: 26,,, | Performed by: RADIOLOGY

## 2020-12-16 PROCEDURE — 77063 MAMMO DIGITAL SCREENING BILAT WITH TOMO: ICD-10-PCS | Mod: 26,,, | Performed by: RADIOLOGY

## 2020-12-23 ENCOUNTER — NURSE TRIAGE (OUTPATIENT)
Dept: ADMINISTRATIVE | Facility: CLINIC | Age: 50
End: 2020-12-23

## 2020-12-23 NOTE — TELEPHONE ENCOUNTER
spoke with pt:  Experiencing a sore throat. started last night. Was placed on quarantine by job on 12/19/20. S/s started 12/22/20. Protocol advice given also gave UC or community testing as option- but no community testing 12/24/-12/25. Pt verbalizes understanding.     Reason for Disposition   [1] COVID-19 infection suspected by caller or triager AND [2] mild symptoms (cough, fever, or others) AND [3] no complications or SOB    Additional Information   Negative: Severe difficulty breathing (e.g., struggling for each breath, speaks in single words)   Negative: Difficult to awaken or acting confused (e.g., disoriented, slurred speech)   Negative: Bluish (or gray) lips or face now   Negative: Shock suspected (e.g., cold/pale/clammy skin, too weak to stand, low BP, rapid pulse)   Negative: Sounds like a life-threatening emergency to the triager   Negative: SEVERE or constant chest pain or pressure (Exception: mild central chest pain, present only when coughing)   Negative: MODERATE difficulty breathing (e.g., speaks in phrases, SOB even at rest, pulse 100-120)   Negative: MILD difficulty breathing (e.g., minimal/no SOB at rest, SOB with walking, pulse <100)   Negative: Chest pain   Negative: Patient sounds very sick or weak to the triager   Negative: Fever > 103 F (39.4 C)   Negative: [1] Fever > 101 F (38.3 C) AND [2] age > 60   Negative: [1] Fever > 100.0 F (37.8 C) AND [2] bedridden (e.g., nursing home patient, CVA, chronic illness, recovering from surgery)   Negative: HIGH RISK patient (e.g., age > 64 years, diabetes, heart or lung disease, weak immune system) (Exception: has already been evaluated by healthcare provider and has no new or worsening symptoms)    Protocols used: CORONAVIRUS (COVID-19) DIAGNOSED OR [id number]

## 2020-12-31 ENCOUNTER — NURSE TRIAGE (OUTPATIENT)
Dept: ADMINISTRATIVE | Facility: CLINIC | Age: 50
End: 2020-12-31

## 2020-12-31 NOTE — TELEPHONE ENCOUNTER
Pt is part of the COVID-19 home symptom monitoring program. Reports continuous cough with no relief from OTC cough meds. Per protocol, home care advice given.       Reason for Disposition   [1] Continuous (nonstop) coughing interferes with work or school AND [2] no improvement using cough treatment per protocol    Additional Information   Negative: Severe difficulty breathing (e.g., struggling for each breath, speaks in single words)   Negative: Difficult to awaken or acting confused (e.g., disoriented, slurred speech)   Negative: Bluish (or gray) lips or face now   Negative: Shock suspected (e.g., cold/pale/clammy skin, too weak to stand, low BP, rapid pulse)   Negative: Sounds like a life-threatening emergency to the triager   Negative: [1] COVID-19 exposure AND [2] no symptoms   Negative: COVID-19 and Breastfeeding, questions about   Negative: [1] Adult with possible COVID-19 symptoms AND [2] triager concerned about severity of symptoms or other causes   Negative: SEVERE or constant chest pain or pressure (Exception: mild central chest pain, present only when coughing)   Negative: MODERATE difficulty breathing (e.g., speaks in phrases, SOB even at rest, pulse 100-120)   Negative: MILD difficulty breathing (e.g., minimal/no SOB at rest, SOB with walking, pulse <100)   Negative: Chest pain   Negative: Patient sounds very sick or weak to the triager   Negative: Fever > 103 F (39.4 C)   Negative: [1] Fever > 101 F (38.3 C) AND [2] age > 60   Negative: [1] Fever > 100.0 F (37.8 C) AND [2] bedridden (e.g., nursing home patient, CVA, chronic illness, recovering from surgery)   Negative: HIGH RISK patient (e.g., age > 64 years, diabetes, heart or lung disease, weak immune system) (Exception: has already been evaluated by healthcare provider and has no new or worsening symptoms)   Negative: [1] COVID-19 infection suspected by caller or triager AND [2] mild symptoms (cough, fever, or others) AND [3] no  complications or SOB   Negative: Fever present > 3 days (72 hours)   Negative: [1] Fever returns after gone for over 24 hours AND [2] symptoms worse or not improved    Protocols used: CORONAVIRUS (COVID-19) DIAGNOSED OR [id number]

## 2021-01-04 ENCOUNTER — PATIENT MESSAGE (OUTPATIENT)
Dept: INTERNAL MEDICINE | Facility: CLINIC | Age: 51
End: 2021-01-04

## 2021-01-04 DIAGNOSIS — J06.9 UPPER RESPIRATORY TRACT INFECTION DUE TO COVID-19 VIRUS: Primary | ICD-10-CM

## 2021-01-04 DIAGNOSIS — U07.1 UPPER RESPIRATORY TRACT INFECTION DUE TO COVID-19 VIRUS: Primary | ICD-10-CM

## 2021-01-04 DIAGNOSIS — J45.20 MILD INTERMITTENT ASTHMA WITHOUT COMPLICATION: Chronic | ICD-10-CM

## 2021-01-04 RX ORDER — ALBUTEROL SULFATE 90 UG/1
2 AEROSOL, METERED RESPIRATORY (INHALATION) EVERY 6 HOURS PRN
Qty: 18 G | Refills: 0 | Status: SHIPPED | OUTPATIENT
Start: 2021-01-04 | End: 2021-03-25 | Stop reason: SDUPTHER

## 2021-01-06 RX ORDER — DEXAMETHASONE 4 MG/1
4 TABLET ORAL DAILY
Qty: 10 TABLET | Refills: 0 | Status: SHIPPED | OUTPATIENT
Start: 2021-01-06 | End: 2021-01-16

## 2021-03-18 ENCOUNTER — PATIENT OUTREACH (OUTPATIENT)
Dept: ADMINISTRATIVE | Facility: OTHER | Age: 51
End: 2021-03-18

## 2021-03-19 ENCOUNTER — OFFICE VISIT (OUTPATIENT)
Dept: OBSTETRICS AND GYNECOLOGY | Facility: CLINIC | Age: 51
End: 2021-03-19
Payer: COMMERCIAL

## 2021-03-19 VITALS
DIASTOLIC BLOOD PRESSURE: 78 MMHG | SYSTOLIC BLOOD PRESSURE: 130 MMHG | BODY MASS INDEX: 27.48 KG/M2 | WEIGHT: 160.94 LBS | HEIGHT: 64 IN

## 2021-03-19 DIAGNOSIS — N95.1 PERIMENOPAUSE: Primary | ICD-10-CM

## 2021-03-19 PROCEDURE — 3008F PR BODY MASS INDEX (BMI) DOCUMENTED: ICD-10-PCS | Mod: CPTII,S$GLB,, | Performed by: OBSTETRICS & GYNECOLOGY

## 2021-03-19 PROCEDURE — 99213 PR OFFICE/OUTPT VISIT, EST, LEVL III, 20-29 MIN: ICD-10-PCS | Mod: S$GLB,,, | Performed by: OBSTETRICS & GYNECOLOGY

## 2021-03-19 PROCEDURE — 81025 URINE PREGNANCY TEST: CPT | Mod: S$GLB,,, | Performed by: OBSTETRICS & GYNECOLOGY

## 2021-03-19 PROCEDURE — 81025 PR  URINE PREGNANCY TEST: ICD-10-PCS | Mod: S$GLB,,, | Performed by: OBSTETRICS & GYNECOLOGY

## 2021-03-19 PROCEDURE — 3008F BODY MASS INDEX DOCD: CPT | Mod: CPTII,S$GLB,, | Performed by: OBSTETRICS & GYNECOLOGY

## 2021-03-19 PROCEDURE — 1126F AMNT PAIN NOTED NONE PRSNT: CPT | Mod: S$GLB,,, | Performed by: OBSTETRICS & GYNECOLOGY

## 2021-03-19 PROCEDURE — 99999 PR PBB SHADOW E&M-EST. PATIENT-LVL III: CPT | Mod: PBBFAC,,, | Performed by: OBSTETRICS & GYNECOLOGY

## 2021-03-19 PROCEDURE — 99999 PR PBB SHADOW E&M-EST. PATIENT-LVL III: ICD-10-PCS | Mod: PBBFAC,,, | Performed by: OBSTETRICS & GYNECOLOGY

## 2021-03-19 PROCEDURE — 1126F PR PAIN SEVERITY QUANTIFIED, NO PAIN PRESENT: ICD-10-PCS | Mod: S$GLB,,, | Performed by: OBSTETRICS & GYNECOLOGY

## 2021-03-19 PROCEDURE — 99213 OFFICE O/P EST LOW 20 MIN: CPT | Mod: S$GLB,,, | Performed by: OBSTETRICS & GYNECOLOGY

## 2021-03-19 RX ORDER — MEDROXYPROGESTERONE ACETATE 2.5 MG/1
2.5 TABLET ORAL DAILY
Qty: 30 TABLET | Refills: 3 | Status: SHIPPED | OUTPATIENT
Start: 2021-03-19 | End: 2021-06-15 | Stop reason: SDUPTHER

## 2021-03-19 RX ORDER — ESTRADIOL 1 MG/1
1 TABLET ORAL DAILY
Qty: 30 TABLET | Refills: 3 | Status: SHIPPED | OUTPATIENT
Start: 2021-03-19 | End: 2021-06-15 | Stop reason: SDUPTHER

## 2021-03-25 ENCOUNTER — OFFICE VISIT (OUTPATIENT)
Dept: INTERNAL MEDICINE | Facility: CLINIC | Age: 51
End: 2021-03-25
Payer: COMMERCIAL

## 2021-03-25 ENCOUNTER — LAB VISIT (OUTPATIENT)
Dept: LAB | Facility: HOSPITAL | Age: 51
End: 2021-03-25
Attending: FAMILY MEDICINE
Payer: COMMERCIAL

## 2021-03-25 VITALS
HEART RATE: 79 BPM | SYSTOLIC BLOOD PRESSURE: 120 MMHG | OXYGEN SATURATION: 100 % | TEMPERATURE: 99 F | WEIGHT: 158.5 LBS | BODY MASS INDEX: 27.06 KG/M2 | DIASTOLIC BLOOD PRESSURE: 78 MMHG | HEIGHT: 64 IN

## 2021-03-25 DIAGNOSIS — Z13.220 SCREENING FOR LIPID DISORDERS: ICD-10-CM

## 2021-03-25 DIAGNOSIS — Z12.11 SCREENING FOR COLORECTAL CANCER: ICD-10-CM

## 2021-03-25 DIAGNOSIS — Z12.12 SCREENING FOR COLORECTAL CANCER: ICD-10-CM

## 2021-03-25 DIAGNOSIS — E89.0 HISTORY OF PARTIAL THYROIDECTOMY: ICD-10-CM

## 2021-03-25 DIAGNOSIS — D64.9 MILD ANEMIA: ICD-10-CM

## 2021-03-25 DIAGNOSIS — Z23 NEED FOR SHINGLES VACCINE: ICD-10-CM

## 2021-03-25 DIAGNOSIS — G47.09 OTHER INSOMNIA: ICD-10-CM

## 2021-03-25 DIAGNOSIS — Z86.16 HISTORY OF COVID-19: Chronic | ICD-10-CM

## 2021-03-25 DIAGNOSIS — E04.2 MULTIPLE THYROID NODULES: ICD-10-CM

## 2021-03-25 DIAGNOSIS — Z00.01 ENCOUNTER FOR WELL ADULT EXAM WITH ABNORMAL FINDINGS: ICD-10-CM

## 2021-03-25 DIAGNOSIS — Z23 NEED FOR 23-POLYVALENT PNEUMOCOCCAL POLYSACCHARIDE VACCINE: ICD-10-CM

## 2021-03-25 DIAGNOSIS — Z00.01 ENCOUNTER FOR WELL ADULT EXAM WITH ABNORMAL FINDINGS: Primary | ICD-10-CM

## 2021-03-25 DIAGNOSIS — Z13.1 SCREENING FOR DIABETES MELLITUS: ICD-10-CM

## 2021-03-25 DIAGNOSIS — J45.20 MILD INTERMITTENT ASTHMA WITHOUT COMPLICATION: Chronic | ICD-10-CM

## 2021-03-25 DIAGNOSIS — J30.1 SEASONAL ALLERGIC RHINITIS DUE TO POLLEN: ICD-10-CM

## 2021-03-25 DIAGNOSIS — E66.3 OVERWEIGHT: ICD-10-CM

## 2021-03-25 PROCEDURE — 99999 PR PBB SHADOW E&M-EST. PATIENT-LVL IV: CPT | Mod: PBBFAC,,, | Performed by: FAMILY MEDICINE

## 2021-03-25 PROCEDURE — 1126F AMNT PAIN NOTED NONE PRSNT: CPT | Mod: S$GLB,,, | Performed by: FAMILY MEDICINE

## 2021-03-25 PROCEDURE — 99999 PR PBB SHADOW E&M-EST. PATIENT-LVL IV: ICD-10-PCS | Mod: PBBFAC,,, | Performed by: FAMILY MEDICINE

## 2021-03-25 PROCEDURE — 3008F PR BODY MASS INDEX (BMI) DOCUMENTED: ICD-10-PCS | Mod: CPTII,S$GLB,, | Performed by: FAMILY MEDICINE

## 2021-03-25 PROCEDURE — 85018 HEMOGLOBIN: CPT | Performed by: FAMILY MEDICINE

## 2021-03-25 PROCEDURE — 1126F PR PAIN SEVERITY QUANTIFIED, NO PAIN PRESENT: ICD-10-PCS | Mod: S$GLB,,, | Performed by: FAMILY MEDICINE

## 2021-03-25 PROCEDURE — 82728 ASSAY OF FERRITIN: CPT | Performed by: FAMILY MEDICINE

## 2021-03-25 PROCEDURE — 36415 COLL VENOUS BLD VENIPUNCTURE: CPT | Performed by: FAMILY MEDICINE

## 2021-03-25 PROCEDURE — 80061 LIPID PANEL: CPT | Performed by: FAMILY MEDICINE

## 2021-03-25 PROCEDURE — 99396 PREV VISIT EST AGE 40-64: CPT | Mod: S$GLB,,, | Performed by: FAMILY MEDICINE

## 2021-03-25 PROCEDURE — 85014 HEMATOCRIT: CPT | Performed by: FAMILY MEDICINE

## 2021-03-25 PROCEDURE — 84443 ASSAY THYROID STIM HORMONE: CPT | Performed by: FAMILY MEDICINE

## 2021-03-25 PROCEDURE — 83540 ASSAY OF IRON: CPT | Performed by: FAMILY MEDICINE

## 2021-03-25 PROCEDURE — 99396 PR PREVENTIVE VISIT,EST,40-64: ICD-10-PCS | Mod: S$GLB,,, | Performed by: FAMILY MEDICINE

## 2021-03-25 PROCEDURE — 82947 ASSAY GLUCOSE BLOOD QUANT: CPT | Performed by: FAMILY MEDICINE

## 2021-03-25 PROCEDURE — 3008F BODY MASS INDEX DOCD: CPT | Mod: CPTII,S$GLB,, | Performed by: FAMILY MEDICINE

## 2021-03-25 RX ORDER — FLUTICASONE PROPIONATE 50 MCG
2 SPRAY, SUSPENSION (ML) NASAL DAILY
Qty: 16 ML | Refills: 11 | Status: SHIPPED | OUTPATIENT
Start: 2021-03-25 | End: 2022-03-25 | Stop reason: SDUPTHER

## 2021-03-25 RX ORDER — AMITRIPTYLINE HYDROCHLORIDE 10 MG/1
10-20 TABLET, FILM COATED ORAL NIGHTLY PRN
Qty: 60 TABLET | Refills: 11 | Status: SHIPPED | OUTPATIENT
Start: 2021-03-25 | End: 2021-11-30

## 2021-03-25 RX ORDER — ALBUTEROL SULFATE 90 UG/1
2 AEROSOL, METERED RESPIRATORY (INHALATION) EVERY 6 HOURS PRN
Qty: 18 G | Refills: 5 | Status: SHIPPED | OUTPATIENT
Start: 2021-03-25 | End: 2022-03-25 | Stop reason: SDUPTHER

## 2021-03-26 LAB
CHOLEST SERPL-MCNC: 215 MG/DL (ref 120–199)
CHOLEST/HDLC SERPL: 2.5 {RATIO} (ref 2–5)
FERRITIN SERPL-MCNC: 73 NG/ML (ref 20–300)
GLUCOSE SERPL-MCNC: 92 MG/DL (ref 70–110)
HCT VFR BLD AUTO: 38.8 % (ref 37–48.5)
HDLC SERPL-MCNC: 86 MG/DL (ref 40–75)
HDLC SERPL: 40 % (ref 20–50)
HGB BLD-MCNC: 12.3 G/DL (ref 12–16)
IRON SERPL-MCNC: 84 UG/DL (ref 30–160)
LDLC SERPL CALC-MCNC: 113.6 MG/DL (ref 63–159)
NONHDLC SERPL-MCNC: 129 MG/DL
SATURATED IRON: 18 % (ref 20–50)
TOTAL IRON BINDING CAPACITY: 478 UG/DL (ref 250–450)
TRANSFERRIN SERPL-MCNC: 323 MG/DL (ref 200–375)
TRIGL SERPL-MCNC: 77 MG/DL (ref 30–150)
TSH SERPL DL<=0.005 MIU/L-ACNC: 1.64 UIU/ML (ref 0.4–4)

## 2021-03-28 DIAGNOSIS — D50.9 IRON DEFICIENCY ANEMIA, UNSPECIFIED IRON DEFICIENCY ANEMIA TYPE: Primary | ICD-10-CM

## 2021-03-28 RX ORDER — FERROUS SULFATE 325(65) MG
325 TABLET ORAL EVERY OTHER DAY
Qty: 90 TABLET | Refills: 1 | Status: SHIPPED | OUTPATIENT
Start: 2021-03-28 | End: 2021-11-30

## 2021-03-30 ENCOUNTER — TELEPHONE (OUTPATIENT)
Dept: INTERNAL MEDICINE | Facility: CLINIC | Age: 51
End: 2021-03-30

## 2021-04-12 ENCOUNTER — HOSPITAL ENCOUNTER (OUTPATIENT)
Dept: RADIOLOGY | Facility: HOSPITAL | Age: 51
Discharge: HOME | End: 2021-04-12
Attending: FAMILY MEDICINE
Payer: COMMERCIAL

## 2021-04-12 DIAGNOSIS — E04.2 MULTIPLE THYROID NODULES: ICD-10-CM

## 2021-04-12 DIAGNOSIS — E89.0 HISTORY OF PARTIAL THYROIDECTOMY: ICD-10-CM

## 2021-04-12 PROCEDURE — 76536 US EXAM OF HEAD AND NECK: CPT | Mod: TC

## 2021-04-12 PROCEDURE — 76536 US SOFT TISSUE HEAD NECK THYROID: ICD-10-PCS | Mod: 26,,, | Performed by: RADIOLOGY

## 2021-04-12 PROCEDURE — 76536 US EXAM OF HEAD AND NECK: CPT | Mod: 26,,, | Performed by: RADIOLOGY

## 2021-04-18 ENCOUNTER — PATIENT MESSAGE (OUTPATIENT)
Dept: INTERNAL MEDICINE | Facility: CLINIC | Age: 51
End: 2021-04-18

## 2021-04-18 DIAGNOSIS — E04.2 MULTIPLE THYROID NODULES: ICD-10-CM

## 2021-04-26 ENCOUNTER — HOSPITAL ENCOUNTER (OUTPATIENT)
Dept: RADIOLOGY | Facility: HOSPITAL | Age: 51
Discharge: HOME | End: 2021-04-26
Attending: FAMILY MEDICINE
Payer: COMMERCIAL

## 2021-04-26 DIAGNOSIS — E04.2 MULTIPLE THYROID NODULES: ICD-10-CM

## 2021-04-26 PROCEDURE — 88173 CYTOPATH EVAL FNA REPORT: CPT | Mod: 26,,, | Performed by: PATHOLOGY

## 2021-04-26 PROCEDURE — 88173 CYTOPATH EVAL FNA REPORT: CPT | Mod: 59 | Performed by: PATHOLOGY

## 2021-04-26 PROCEDURE — 10005 FNA BX W/US GDN 1ST LES: CPT

## 2021-04-26 PROCEDURE — 88173 CYTOPATH EVAL FNA REPORT: CPT | Performed by: PATHOLOGY

## 2021-04-26 PROCEDURE — 88173 PR  INTERPRETATION OF FNA SMEAR: ICD-10-PCS | Mod: 26,,, | Performed by: PATHOLOGY

## 2021-04-26 PROCEDURE — 10006 FNA BX W/US GDN EA ADDL: CPT

## 2021-04-29 ENCOUNTER — TELEPHONE (OUTPATIENT)
Dept: OTOLARYNGOLOGY | Facility: CLINIC | Age: 51
End: 2021-04-29

## 2021-04-29 LAB
FINAL PATHOLOGIC DIAGNOSIS: NORMAL
FINAL PATHOLOGIC DIAGNOSIS: NORMAL
Lab: NORMAL
Lab: NORMAL
NONINV COLON CA DNA+OCC BLD SCRN STL QL: NEGATIVE

## 2021-05-08 ENCOUNTER — PATIENT OUTREACH (OUTPATIENT)
Dept: ADMINISTRATIVE | Facility: OTHER | Age: 51
End: 2021-05-08

## 2021-05-10 ENCOUNTER — OFFICE VISIT (OUTPATIENT)
Dept: OTOLARYNGOLOGY | Facility: CLINIC | Age: 51
End: 2021-05-10
Payer: COMMERCIAL

## 2021-05-10 VITALS
DIASTOLIC BLOOD PRESSURE: 83 MMHG | WEIGHT: 162.06 LBS | HEART RATE: 76 BPM | BODY MASS INDEX: 27.81 KG/M2 | TEMPERATURE: 98 F | SYSTOLIC BLOOD PRESSURE: 128 MMHG

## 2021-05-10 DIAGNOSIS — E04.2 MULTIPLE THYROID NODULES: ICD-10-CM

## 2021-05-10 PROCEDURE — 99203 OFFICE O/P NEW LOW 30 MIN: CPT | Mod: S$GLB,,, | Performed by: ORTHOPAEDIC SURGERY

## 2021-05-10 PROCEDURE — 3008F BODY MASS INDEX DOCD: CPT | Mod: CPTII,S$GLB,, | Performed by: ORTHOPAEDIC SURGERY

## 2021-05-10 PROCEDURE — 99203 PR OFFICE/OUTPT VISIT, NEW, LEVL III, 30-44 MIN: ICD-10-PCS | Mod: S$GLB,,, | Performed by: ORTHOPAEDIC SURGERY

## 2021-05-10 PROCEDURE — 3008F PR BODY MASS INDEX (BMI) DOCUMENTED: ICD-10-PCS | Mod: CPTII,S$GLB,, | Performed by: ORTHOPAEDIC SURGERY

## 2021-05-10 PROCEDURE — 1126F AMNT PAIN NOTED NONE PRSNT: CPT | Mod: S$GLB,,, | Performed by: ORTHOPAEDIC SURGERY

## 2021-05-10 PROCEDURE — 99999 PR PBB SHADOW E&M-EST. PATIENT-LVL IV: CPT | Mod: PBBFAC,,, | Performed by: ORTHOPAEDIC SURGERY

## 2021-05-10 PROCEDURE — 99999 PR PBB SHADOW E&M-EST. PATIENT-LVL IV: ICD-10-PCS | Mod: PBBFAC,,, | Performed by: ORTHOPAEDIC SURGERY

## 2021-05-10 PROCEDURE — 1126F PR PAIN SEVERITY QUANTIFIED, NO PAIN PRESENT: ICD-10-PCS | Mod: S$GLB,,, | Performed by: ORTHOPAEDIC SURGERY

## 2021-05-11 ENCOUNTER — TELEPHONE (OUTPATIENT)
Dept: INTERNAL MEDICINE | Facility: CLINIC | Age: 51
End: 2021-05-11

## 2021-06-15 ENCOUNTER — OFFICE VISIT (OUTPATIENT)
Dept: OBSTETRICS AND GYNECOLOGY | Facility: CLINIC | Age: 51
End: 2021-06-15
Payer: COMMERCIAL

## 2021-06-15 VITALS
DIASTOLIC BLOOD PRESSURE: 88 MMHG | BODY MASS INDEX: 28.38 KG/M2 | HEIGHT: 64 IN | WEIGHT: 166.25 LBS | SYSTOLIC BLOOD PRESSURE: 130 MMHG

## 2021-06-15 DIAGNOSIS — N95.1 PERIMENOPAUSE: Primary | ICD-10-CM

## 2021-06-15 DIAGNOSIS — N76.0 BV (BACTERIAL VAGINOSIS): ICD-10-CM

## 2021-06-15 DIAGNOSIS — B96.89 BV (BACTERIAL VAGINOSIS): ICD-10-CM

## 2021-06-15 PROCEDURE — 99999 PR PBB SHADOW E&M-EST. PATIENT-LVL III: CPT | Mod: PBBFAC,,, | Performed by: OBSTETRICS & GYNECOLOGY

## 2021-06-15 PROCEDURE — 87210 SMEAR WET MOUNT SALINE/INK: CPT | Mod: QW,S$GLB,, | Performed by: OBSTETRICS & GYNECOLOGY

## 2021-06-15 PROCEDURE — 99999 PR PBB SHADOW E&M-EST. PATIENT-LVL III: ICD-10-PCS | Mod: PBBFAC,,, | Performed by: OBSTETRICS & GYNECOLOGY

## 2021-06-15 PROCEDURE — 99213 OFFICE O/P EST LOW 20 MIN: CPT | Mod: S$GLB,,, | Performed by: OBSTETRICS & GYNECOLOGY

## 2021-06-15 PROCEDURE — 99213 PR OFFICE/OUTPT VISIT, EST, LEVL III, 20-29 MIN: ICD-10-PCS | Mod: S$GLB,,, | Performed by: OBSTETRICS & GYNECOLOGY

## 2021-06-15 PROCEDURE — 87210 PR  SMEAR,STAIN,WET MNT,INTERP: ICD-10-PCS | Mod: QW,S$GLB,, | Performed by: OBSTETRICS & GYNECOLOGY

## 2021-06-15 PROCEDURE — 1126F AMNT PAIN NOTED NONE PRSNT: CPT | Mod: S$GLB,,, | Performed by: OBSTETRICS & GYNECOLOGY

## 2021-06-15 PROCEDURE — 1126F PR PAIN SEVERITY QUANTIFIED, NO PAIN PRESENT: ICD-10-PCS | Mod: S$GLB,,, | Performed by: OBSTETRICS & GYNECOLOGY

## 2021-06-15 PROCEDURE — 3008F BODY MASS INDEX DOCD: CPT | Mod: CPTII,S$GLB,, | Performed by: OBSTETRICS & GYNECOLOGY

## 2021-06-15 PROCEDURE — 3008F PR BODY MASS INDEX (BMI) DOCUMENTED: ICD-10-PCS | Mod: CPTII,S$GLB,, | Performed by: OBSTETRICS & GYNECOLOGY

## 2021-06-15 RX ORDER — MEDROXYPROGESTERONE ACETATE 2.5 MG/1
2.5 TABLET ORAL DAILY
Qty: 30 TABLET | Refills: 6 | Status: SHIPPED | OUTPATIENT
Start: 2021-06-15 | End: 2022-03-25

## 2021-06-15 RX ORDER — METRONIDAZOLE 500 MG/1
500 TABLET ORAL 2 TIMES DAILY
Qty: 14 TABLET | Refills: 0 | Status: SHIPPED | OUTPATIENT
Start: 2021-06-15 | End: 2021-06-22

## 2021-06-15 RX ORDER — ESTRADIOL 1 MG/1
1 TABLET ORAL DAILY
Qty: 30 TABLET | Refills: 6 | Status: SHIPPED | OUTPATIENT
Start: 2021-06-15 | End: 2022-03-25

## 2021-07-14 ENCOUNTER — TELEPHONE (OUTPATIENT)
Dept: INTERNAL MEDICINE | Facility: CLINIC | Age: 51
End: 2021-07-14

## 2021-07-15 ENCOUNTER — CLINICAL SUPPORT (OUTPATIENT)
Dept: INTERNAL MEDICINE | Facility: CLINIC | Age: 51
End: 2021-07-15
Payer: COMMERCIAL

## 2021-07-15 DIAGNOSIS — Z23 NEED FOR VACCINATION FOR PNEUMOCOCCUS: Primary | ICD-10-CM

## 2021-07-15 PROCEDURE — 90471 IMMUNIZATION ADMIN: CPT | Mod: S$GLB,,, | Performed by: FAMILY MEDICINE

## 2021-07-15 PROCEDURE — 99999 PR PBB SHADOW E&M-EST. PATIENT-LVL II: ICD-10-PCS | Mod: PBBFAC,,,

## 2021-07-15 PROCEDURE — 90732 PPSV23 VACC 2 YRS+ SUBQ/IM: CPT | Mod: S$GLB,,, | Performed by: FAMILY MEDICINE

## 2021-07-15 PROCEDURE — 90732 PNEUMOCOCCAL POLYSACCHARIDE VACCINE 23-VALENT =>2YO SQ IM: ICD-10-PCS | Mod: S$GLB,,, | Performed by: FAMILY MEDICINE

## 2021-07-15 PROCEDURE — 90471 PNEUMOCOCCAL POLYSACCHARIDE VACCINE 23-VALENT =>2YO SQ IM: ICD-10-PCS | Mod: S$GLB,,, | Performed by: FAMILY MEDICINE

## 2021-07-15 PROCEDURE — 99999 PR PBB SHADOW E&M-EST. PATIENT-LVL II: CPT | Mod: PBBFAC,,,

## 2021-09-21 ENCOUNTER — PATIENT OUTREACH (OUTPATIENT)
Dept: ADMINISTRATIVE | Facility: OTHER | Age: 51
End: 2021-09-21

## 2021-09-21 ENCOUNTER — PATIENT MESSAGE (OUTPATIENT)
Dept: DERMATOLOGY | Facility: CLINIC | Age: 51
End: 2021-09-21

## 2021-09-22 ENCOUNTER — OFFICE VISIT (OUTPATIENT)
Dept: DERMATOLOGY | Facility: CLINIC | Age: 51
End: 2021-09-22
Payer: COMMERCIAL

## 2021-09-22 DIAGNOSIS — L70.0 ACNE VULGARIS: Primary | ICD-10-CM

## 2021-09-22 PROCEDURE — 1159F MED LIST DOCD IN RCRD: CPT | Mod: CPTII,95,, | Performed by: STUDENT IN AN ORGANIZED HEALTH CARE EDUCATION/TRAINING PROGRAM

## 2021-09-22 PROCEDURE — 1160F PR REVIEW ALL MEDS BY PRESCRIBER/CLIN PHARMACIST DOCUMENTED: ICD-10-PCS | Mod: CPTII,95,, | Performed by: STUDENT IN AN ORGANIZED HEALTH CARE EDUCATION/TRAINING PROGRAM

## 2021-09-22 PROCEDURE — 99204 OFFICE O/P NEW MOD 45 MIN: CPT | Mod: 95,,, | Performed by: STUDENT IN AN ORGANIZED HEALTH CARE EDUCATION/TRAINING PROGRAM

## 2021-09-22 PROCEDURE — 1160F RVW MEDS BY RX/DR IN RCRD: CPT | Mod: CPTII,95,, | Performed by: STUDENT IN AN ORGANIZED HEALTH CARE EDUCATION/TRAINING PROGRAM

## 2021-09-22 PROCEDURE — 1159F PR MEDICATION LIST DOCUMENTED IN MEDICAL RECORD: ICD-10-PCS | Mod: CPTII,95,, | Performed by: STUDENT IN AN ORGANIZED HEALTH CARE EDUCATION/TRAINING PROGRAM

## 2021-09-22 PROCEDURE — 99204 PR OFFICE/OUTPT VISIT, NEW, LEVL IV, 45-59 MIN: ICD-10-PCS | Mod: 95,,, | Performed by: STUDENT IN AN ORGANIZED HEALTH CARE EDUCATION/TRAINING PROGRAM

## 2021-09-22 RX ORDER — CLINDAMYCIN PHOSPHATE 10 MG/ML
SOLUTION TOPICAL 2 TIMES DAILY
Qty: 60 EACH | Refills: 3 | Status: SHIPPED | OUTPATIENT
Start: 2021-09-22 | End: 2021-11-30

## 2021-09-22 RX ORDER — SPIRONOLACTONE 50 MG/1
50 TABLET, FILM COATED ORAL 2 TIMES DAILY
Qty: 60 TABLET | Refills: 3 | Status: SHIPPED | OUTPATIENT
Start: 2021-09-22 | End: 2022-04-22 | Stop reason: SDUPTHER

## 2021-09-28 ENCOUNTER — LAB VISIT (OUTPATIENT)
Dept: LAB | Facility: HOSPITAL | Age: 51
End: 2021-09-28
Attending: FAMILY MEDICINE
Payer: COMMERCIAL

## 2021-09-28 DIAGNOSIS — D50.9 IRON DEFICIENCY ANEMIA, UNSPECIFIED IRON DEFICIENCY ANEMIA TYPE: ICD-10-CM

## 2021-09-28 PROCEDURE — 85018 HEMOGLOBIN: CPT | Performed by: FAMILY MEDICINE

## 2021-09-28 PROCEDURE — 82728 ASSAY OF FERRITIN: CPT | Performed by: FAMILY MEDICINE

## 2021-09-28 PROCEDURE — 84466 ASSAY OF TRANSFERRIN: CPT | Performed by: FAMILY MEDICINE

## 2021-09-28 PROCEDURE — 36415 COLL VENOUS BLD VENIPUNCTURE: CPT | Performed by: FAMILY MEDICINE

## 2021-09-28 PROCEDURE — 85014 HEMATOCRIT: CPT | Performed by: FAMILY MEDICINE

## 2021-09-29 LAB
FERRITIN SERPL-MCNC: 75 NG/ML (ref 20–300)
HCT VFR BLD AUTO: 39.1 % (ref 37–48.5)
HGB BLD-MCNC: 12.8 G/DL (ref 12–16)
IRON SERPL-MCNC: 126 UG/DL (ref 30–160)
SATURATED IRON: 28 % (ref 20–50)
TOTAL IRON BINDING CAPACITY: 445 UG/DL (ref 250–450)
TRANSFERRIN SERPL-MCNC: 301 MG/DL (ref 200–375)

## 2021-10-25 ENCOUNTER — OFFICE VISIT (OUTPATIENT)
Dept: INTERNAL MEDICINE | Facility: CLINIC | Age: 51
End: 2021-10-25
Payer: COMMERCIAL

## 2021-10-25 VITALS
OXYGEN SATURATION: 98 % | TEMPERATURE: 98 F | HEART RATE: 93 BPM | BODY MASS INDEX: 28.8 KG/M2 | DIASTOLIC BLOOD PRESSURE: 78 MMHG | WEIGHT: 167.75 LBS | SYSTOLIC BLOOD PRESSURE: 116 MMHG

## 2021-10-25 DIAGNOSIS — J01.40 ACUTE PANSINUSITIS, RECURRENCE NOT SPECIFIED: Primary | ICD-10-CM

## 2021-10-25 DIAGNOSIS — J45.20 MILD INTERMITTENT ASTHMA WITHOUT COMPLICATION: Chronic | ICD-10-CM

## 2021-10-25 DIAGNOSIS — J30.1 SEASONAL ALLERGIC RHINITIS DUE TO POLLEN: ICD-10-CM

## 2021-10-25 PROCEDURE — 3078F PR MOST RECENT DIASTOLIC BLOOD PRESSURE < 80 MM HG: ICD-10-PCS | Mod: CPTII,S$GLB,, | Performed by: FAMILY MEDICINE

## 2021-10-25 PROCEDURE — 99214 OFFICE O/P EST MOD 30 MIN: CPT | Mod: S$GLB,,, | Performed by: FAMILY MEDICINE

## 2021-10-25 PROCEDURE — 99999 PR PBB SHADOW E&M-EST. PATIENT-LVL IV: ICD-10-PCS | Mod: PBBFAC,,, | Performed by: FAMILY MEDICINE

## 2021-10-25 PROCEDURE — 99999 PR PBB SHADOW E&M-EST. PATIENT-LVL IV: CPT | Mod: PBBFAC,,, | Performed by: FAMILY MEDICINE

## 2021-10-25 PROCEDURE — 3008F PR BODY MASS INDEX (BMI) DOCUMENTED: ICD-10-PCS | Mod: CPTII,S$GLB,, | Performed by: FAMILY MEDICINE

## 2021-10-25 PROCEDURE — 3008F BODY MASS INDEX DOCD: CPT | Mod: CPTII,S$GLB,, | Performed by: FAMILY MEDICINE

## 2021-10-25 PROCEDURE — 1160F RVW MEDS BY RX/DR IN RCRD: CPT | Mod: CPTII,S$GLB,, | Performed by: FAMILY MEDICINE

## 2021-10-25 PROCEDURE — 3074F PR MOST RECENT SYSTOLIC BLOOD PRESSURE < 130 MM HG: ICD-10-PCS | Mod: CPTII,S$GLB,, | Performed by: FAMILY MEDICINE

## 2021-10-25 PROCEDURE — 1160F PR REVIEW ALL MEDS BY PRESCRIBER/CLIN PHARMACIST DOCUMENTED: ICD-10-PCS | Mod: CPTII,S$GLB,, | Performed by: FAMILY MEDICINE

## 2021-10-25 PROCEDURE — 99214 PR OFFICE/OUTPT VISIT, EST, LEVL IV, 30-39 MIN: ICD-10-PCS | Mod: S$GLB,,, | Performed by: FAMILY MEDICINE

## 2021-10-25 PROCEDURE — 3078F DIAST BP <80 MM HG: CPT | Mod: CPTII,S$GLB,, | Performed by: FAMILY MEDICINE

## 2021-10-25 PROCEDURE — 1159F PR MEDICATION LIST DOCUMENTED IN MEDICAL RECORD: ICD-10-PCS | Mod: CPTII,S$GLB,, | Performed by: FAMILY MEDICINE

## 2021-10-25 PROCEDURE — 1159F MED LIST DOCD IN RCRD: CPT | Mod: CPTII,S$GLB,, | Performed by: FAMILY MEDICINE

## 2021-10-25 PROCEDURE — 3074F SYST BP LT 130 MM HG: CPT | Mod: CPTII,S$GLB,, | Performed by: FAMILY MEDICINE

## 2021-10-25 RX ORDER — AMOXICILLIN 875 MG/1
875 TABLET, FILM COATED ORAL 2 TIMES DAILY
Qty: 14 TABLET | Refills: 0 | Status: SHIPPED | OUTPATIENT
Start: 2021-10-25 | End: 2021-11-01

## 2021-11-30 ENCOUNTER — OFFICE VISIT (OUTPATIENT)
Dept: OBSTETRICS AND GYNECOLOGY | Facility: CLINIC | Age: 51
End: 2021-11-30
Payer: COMMERCIAL

## 2021-11-30 VITALS
BODY MASS INDEX: 28.31 KG/M2 | HEIGHT: 64 IN | DIASTOLIC BLOOD PRESSURE: 68 MMHG | WEIGHT: 165.81 LBS | SYSTOLIC BLOOD PRESSURE: 110 MMHG

## 2021-11-30 DIAGNOSIS — N95.1 PERIMENOPAUSE: ICD-10-CM

## 2021-11-30 DIAGNOSIS — Z01.419 WELL WOMAN EXAM WITH ROUTINE GYNECOLOGICAL EXAM: Primary | ICD-10-CM

## 2021-11-30 PROCEDURE — 88175 CYTOPATH C/V AUTO FLUID REDO: CPT | Performed by: OBSTETRICS & GYNECOLOGY

## 2021-11-30 PROCEDURE — 99999 PR PBB SHADOW E&M-EST. PATIENT-LVL III: CPT | Mod: PBBFAC,,, | Performed by: OBSTETRICS & GYNECOLOGY

## 2021-11-30 PROCEDURE — 99396 PR PREVENTIVE VISIT,EST,40-64: ICD-10-PCS | Mod: S$GLB,,, | Performed by: OBSTETRICS & GYNECOLOGY

## 2021-11-30 PROCEDURE — 99999 PR PBB SHADOW E&M-EST. PATIENT-LVL III: ICD-10-PCS | Mod: PBBFAC,,, | Performed by: OBSTETRICS & GYNECOLOGY

## 2021-11-30 PROCEDURE — 87624 HPV HI-RISK TYP POOLED RSLT: CPT | Performed by: OBSTETRICS & GYNECOLOGY

## 2021-11-30 PROCEDURE — 99396 PREV VISIT EST AGE 40-64: CPT | Mod: S$GLB,,, | Performed by: OBSTETRICS & GYNECOLOGY

## 2021-12-17 ENCOUNTER — IMMUNIZATION (OUTPATIENT)
Dept: PHARMACY | Facility: CLINIC | Age: 51
End: 2021-12-17
Payer: COMMERCIAL

## 2021-12-17 DIAGNOSIS — Z23 NEED FOR VACCINATION: Primary | ICD-10-CM

## 2022-01-06 ENCOUNTER — HOSPITAL ENCOUNTER (OUTPATIENT)
Dept: RADIOLOGY | Facility: HOSPITAL | Age: 52
Discharge: HOME | End: 2022-01-06
Attending: OBSTETRICS & GYNECOLOGY
Payer: COMMERCIAL

## 2022-01-06 VITALS — HEIGHT: 64 IN | WEIGHT: 165 LBS | BODY MASS INDEX: 28.17 KG/M2

## 2022-01-06 DIAGNOSIS — Z01.419 WELL WOMAN EXAM WITH ROUTINE GYNECOLOGICAL EXAM: ICD-10-CM

## 2022-01-06 PROCEDURE — 77063 MAMMO DIGITAL SCREENING BILAT WITH TOMO: ICD-10-PCS | Mod: 26,,, | Performed by: RADIOLOGY

## 2022-01-06 PROCEDURE — 77063 BREAST TOMOSYNTHESIS BI: CPT | Mod: 26,,, | Performed by: RADIOLOGY

## 2022-01-06 PROCEDURE — 77067 SCR MAMMO BI INCL CAD: CPT | Mod: 26,,, | Performed by: RADIOLOGY

## 2022-01-06 PROCEDURE — 77067 MAMMO DIGITAL SCREENING BILAT WITH TOMO: ICD-10-PCS | Mod: 26,,, | Performed by: RADIOLOGY

## 2022-01-06 PROCEDURE — 77063 BREAST TOMOSYNTHESIS BI: CPT | Mod: TC

## 2022-03-14 ENCOUNTER — PATIENT MESSAGE (OUTPATIENT)
Dept: RESEARCH | Facility: HOSPITAL | Age: 52
End: 2022-03-14
Payer: COMMERCIAL

## 2022-03-25 ENCOUNTER — LAB VISIT (OUTPATIENT)
Dept: LAB | Facility: HOSPITAL | Age: 52
End: 2022-03-25
Attending: FAMILY MEDICINE
Payer: COMMERCIAL

## 2022-03-25 ENCOUNTER — OFFICE VISIT (OUTPATIENT)
Dept: INTERNAL MEDICINE | Facility: CLINIC | Age: 52
End: 2022-03-25
Payer: COMMERCIAL

## 2022-03-25 VITALS
BODY MASS INDEX: 28.15 KG/M2 | HEIGHT: 64 IN | SYSTOLIC BLOOD PRESSURE: 130 MMHG | DIASTOLIC BLOOD PRESSURE: 70 MMHG | OXYGEN SATURATION: 95 % | TEMPERATURE: 98 F | HEART RATE: 90 BPM | WEIGHT: 164.88 LBS

## 2022-03-25 DIAGNOSIS — E04.2 MULTIPLE THYROID NODULES: ICD-10-CM

## 2022-03-25 DIAGNOSIS — K90.49 FOOD INTOLERANCE: ICD-10-CM

## 2022-03-25 DIAGNOSIS — Z13.220 SCREENING FOR LIPID DISORDERS: ICD-10-CM

## 2022-03-25 DIAGNOSIS — Z13.1 SCREENING FOR DIABETES MELLITUS: ICD-10-CM

## 2022-03-25 DIAGNOSIS — E89.0 HISTORY OF PARTIAL THYROIDECTOMY: ICD-10-CM

## 2022-03-25 DIAGNOSIS — J45.20 MILD INTERMITTENT ASTHMA WITHOUT COMPLICATION: Chronic | ICD-10-CM

## 2022-03-25 DIAGNOSIS — Z00.00 PREVENTATIVE HEALTH CARE: ICD-10-CM

## 2022-03-25 DIAGNOSIS — Z00.00 PREVENTATIVE HEALTH CARE: Primary | ICD-10-CM

## 2022-03-25 DIAGNOSIS — J30.1 SEASONAL ALLERGIC RHINITIS DUE TO POLLEN: ICD-10-CM

## 2022-03-25 DIAGNOSIS — E66.3 OVERWEIGHT: ICD-10-CM

## 2022-03-25 DIAGNOSIS — H81.10 BENIGN PAROXYSMAL POSITIONAL VERTIGO, UNSPECIFIED LATERALITY: ICD-10-CM

## 2022-03-25 LAB
CHOLEST SERPL-MCNC: 245 MG/DL (ref 120–199)
CHOLEST/HDLC SERPL: 2.7 {RATIO} (ref 2–5)
GLUCOSE SERPL-MCNC: 92 MG/DL (ref 70–110)
HDLC SERPL-MCNC: 90 MG/DL (ref 40–75)
HDLC SERPL: 36.7 % (ref 20–50)
IGA SERPL-MCNC: 283 MG/DL (ref 40–350)
LDLC SERPL CALC-MCNC: 143 MG/DL (ref 63–159)
NONHDLC SERPL-MCNC: 155 MG/DL
TRIGL SERPL-MCNC: 60 MG/DL (ref 30–150)
TSH SERPL DL<=0.005 MIU/L-ACNC: 0.8 UIU/ML (ref 0.4–4)

## 2022-03-25 PROCEDURE — 99396 PREV VISIT EST AGE 40-64: CPT | Mod: S$GLB,,, | Performed by: FAMILY MEDICINE

## 2022-03-25 PROCEDURE — 3078F DIAST BP <80 MM HG: CPT | Mod: CPTII,S$GLB,, | Performed by: FAMILY MEDICINE

## 2022-03-25 PROCEDURE — 83516 IMMUNOASSAY NONANTIBODY: CPT | Performed by: FAMILY MEDICINE

## 2022-03-25 PROCEDURE — 3008F BODY MASS INDEX DOCD: CPT | Mod: CPTII,S$GLB,, | Performed by: FAMILY MEDICINE

## 2022-03-25 PROCEDURE — 1160F PR REVIEW ALL MEDS BY PRESCRIBER/CLIN PHARMACIST DOCUMENTED: ICD-10-PCS | Mod: CPTII,S$GLB,, | Performed by: FAMILY MEDICINE

## 2022-03-25 PROCEDURE — 82947 ASSAY GLUCOSE BLOOD QUANT: CPT | Performed by: FAMILY MEDICINE

## 2022-03-25 PROCEDURE — 3078F PR MOST RECENT DIASTOLIC BLOOD PRESSURE < 80 MM HG: ICD-10-PCS | Mod: CPTII,S$GLB,, | Performed by: FAMILY MEDICINE

## 2022-03-25 PROCEDURE — 84443 ASSAY THYROID STIM HORMONE: CPT | Performed by: FAMILY MEDICINE

## 2022-03-25 PROCEDURE — 3008F PR BODY MASS INDEX (BMI) DOCUMENTED: ICD-10-PCS | Mod: CPTII,S$GLB,, | Performed by: FAMILY MEDICINE

## 2022-03-25 PROCEDURE — 1160F RVW MEDS BY RX/DR IN RCRD: CPT | Mod: CPTII,S$GLB,, | Performed by: FAMILY MEDICINE

## 2022-03-25 PROCEDURE — 1159F PR MEDICATION LIST DOCUMENTED IN MEDICAL RECORD: ICD-10-PCS | Mod: CPTII,S$GLB,, | Performed by: FAMILY MEDICINE

## 2022-03-25 PROCEDURE — 3075F PR MOST RECENT SYSTOLIC BLOOD PRESS GE 130-139MM HG: ICD-10-PCS | Mod: CPTII,S$GLB,, | Performed by: FAMILY MEDICINE

## 2022-03-25 PROCEDURE — 3075F SYST BP GE 130 - 139MM HG: CPT | Mod: CPTII,S$GLB,, | Performed by: FAMILY MEDICINE

## 2022-03-25 PROCEDURE — 36415 COLL VENOUS BLD VENIPUNCTURE: CPT | Performed by: FAMILY MEDICINE

## 2022-03-25 PROCEDURE — 99396 PR PREVENTIVE VISIT,EST,40-64: ICD-10-PCS | Mod: S$GLB,,, | Performed by: FAMILY MEDICINE

## 2022-03-25 PROCEDURE — 80061 LIPID PANEL: CPT | Performed by: FAMILY MEDICINE

## 2022-03-25 PROCEDURE — 99999 PR PBB SHADOW E&M-EST. PATIENT-LVL IV: CPT | Mod: PBBFAC,,, | Performed by: FAMILY MEDICINE

## 2022-03-25 PROCEDURE — 82784 ASSAY IGA/IGD/IGG/IGM EACH: CPT | Performed by: FAMILY MEDICINE

## 2022-03-25 PROCEDURE — 99999 PR PBB SHADOW E&M-EST. PATIENT-LVL IV: ICD-10-PCS | Mod: PBBFAC,,, | Performed by: FAMILY MEDICINE

## 2022-03-25 PROCEDURE — 1159F MED LIST DOCD IN RCRD: CPT | Mod: CPTII,S$GLB,, | Performed by: FAMILY MEDICINE

## 2022-03-25 RX ORDER — CETIRIZINE HYDROCHLORIDE 10 MG/1
10 TABLET ORAL DAILY
COMMUNITY
End: 2023-11-28 | Stop reason: SDUPTHER

## 2022-03-25 RX ORDER — ALBUTEROL SULFATE 90 UG/1
2 AEROSOL, METERED RESPIRATORY (INHALATION) EVERY 6 HOURS PRN
Qty: 8.5 G | Refills: 5 | Status: SHIPPED | OUTPATIENT
Start: 2022-03-25 | End: 2023-11-28 | Stop reason: SDUPTHER

## 2022-03-25 RX ORDER — FLUTICASONE PROPIONATE 50 MCG
2 SPRAY, SUSPENSION (ML) NASAL DAILY
Qty: 16 G | Refills: 11 | Status: SHIPPED | OUTPATIENT
Start: 2022-03-25 | End: 2023-11-28 | Stop reason: SDUPTHER

## 2022-03-25 RX ORDER — CETIRIZINE HYDROCHLORIDE 1 MG/ML
SOLUTION ORAL
COMMUNITY
End: 2022-03-25

## 2022-03-25 RX ORDER — MINERAL OIL
ENEMA (ML) RECTAL
COMMUNITY
End: 2023-11-28 | Stop reason: SDUPTHER

## 2022-03-25 NOTE — ASSESSMENT & PLAN NOTE
"Wt Readings from Last 6 Encounters:   03/25/22 74.8 kg (164 lb 14.5 oz)   01/06/22 74.8 kg (165 lb)   11/30/21 75.2 kg (165 lb 12.6 oz)   10/25/21 76.1 kg (167 lb 12.3 oz)   06/15/21 75.4 kg (166 lb 3.6 oz)   05/10/21 73.5 kg (162 lb 0.6 oz)     Estimated body mass index is 28.31 kg/m² as calculated from the following:    Height as of this encounter: 5' 4" (1.626 m).    Weight as of this encounter: 74.8 kg (164 lb 14.5 oz).    "

## 2022-03-25 NOTE — PROGRESS NOTES
WELLNESS VISIT (PREVENTIVE SERVICES)  3/25/22 10:00 AM CDT  LAST ENCOUNTER WITH ME:  3/25/2021   HEALTH MAINTENANCE INTERVENTIONS - UP TO DATE  Health Maintenance Topics with due status: Not Due       Topic Last Completion Date    TETANUS VACCINE 10/19/2015    Colorectal Cancer Screening 04/25/2021    Pneumococcal Vaccines (Age 0-64) 07/15/2021    Cervical Cancer Screening 11/30/2021    COVID-19 Vaccine 12/17/2021    Mammogram 01/06/2022    Shingles Vaccine 03/08/2022       HEALTH MAINTENANCE INTERVENTIONS - DUE OR DUE SOON  Health Maintenance Due   Topic Date Due    Influenza Vaccine (1) 09/01/2021    Lipid Panel  03/25/2022       CHIEF COMPLAINT: Annual Wellness Visit (Preventive Services)    DIAGNOSES SPECIFICALLY EVALUATED AND TREATED THIS ENCOUNTER  1. Preventative health care  - Glucose, Random; Future  - TSH; Future  - Lipid Panel; Future    2. Mild intermittent asthma without complication  - albuterol (PROAIR HFA) 90 mcg/actuation inhaler; Inhale 2 puffs into the lungs every 6 (six) hours as needed for Wheezing or Shortness of Breath (or Cough).  Dispense: 8.5 g; Refill: 5    3. History of partial thyroidectomy  - TSH; Future    4. Multiple thyroid nodules  - TSH; Future    5. Overweight    6. Seasonal allergic rhinitis due to pollen  - fluticasone propionate (FLONASE) 50 mcg/actuation nasal spray; 2 sprays (100 mcg total) by Each Nostril route once daily.  Dispense: 16 g; Refill: 11    7. Screening for diabetes mellitus  - Glucose, Random; Future    8. Screening for lipid disorders  - Lipid Panel; Future    9. Food intolerance  - Tissue transglutaminase, IgA; Future  - IgA; Future    10. Benign paroxysmal positional vertigo, unspecified laterality     Follow up in about 1 year (around 3/25/2023).    Problem List Items Addressed This Visit     Seasonal allergic rhinitis due to pollen (Chronic)    Relevant Medications    fluticasone propionate (FLONASE) 50 mcg/actuation nasal spray    Mild intermittent  asthma without complication (Chronic)    Relevant Medications    albuterol (PROAIR HFA) 90 mcg/actuation inhaler    History of partial thyroidectomy (Chronic)    Current Assessment & Plan     Lab Results   Component Value Date    TSH 1.640 03/25/2021    TSH 0.930 07/29/2019    TSH 0.992 10/27/2017    FREET4 0.96 08/10/2015    FREET4 1.22 04/17/2014    FREET4 1.29 10/11/2010     Lab Results   Component Value Date    THYROPEROXID <6.0 04/17/2014               Relevant Orders    TSH    Multiple thyroid nodules (Chronic)    Overview     US Soft Tissue Head Neck Thyroid  Narrative: EXAMINATION:  US SOFT TISSUE HEAD NECK THYROID    CLINICAL HISTORY:  Nontoxic multinodular goiter    TECHNIQUE:  Ultrasound of the thyroid and cervical lymph nodes was performed.    COMPARISON:  11/04/2014    FINDINGS:  The patient is status post right hemithyroidectomy.  Right thyroid fossa appears unremarkable.  The left lobe is large in size measuring 5.9 x 2.0 x 2.8 cm. Thyroid isthmus measures 3 mm AP.  Total thyroid volume measures approximately 16 mL.  There is a dominant solid isoechoic to hyperechoic nodule at the mid to lower pole of the left lobe which measures up to 3 cm and may be minimally increased in size from prior.  Additional solid more hypoechoic nodule noted at the upper to mid pole of the left lobe measures slightly larger than prior at 17 mm.  No suspicious microcalcifications visualized.  No lymphadenopathy is seen.  Impression: 1. Interval right hemithyroidectomy.  2. Recommend FNA of the 2 nodules in the left lobe as described above.  This report was flagged in Epic as abnormal.    Electronically signed by: Michael Villegas MD  Date:    04/12/2021  Time:    09:41       THYROID ULTRASOUND 11/04/14  Findings: The right lobe measures 5.2 x 2.7 x 3cm and contains a solitary dominant solid nodule measuring 4.6 x 2.5 x 3.1 cm.  The isthmus is 4.2 mm in AP diameter.  The left lobe measured 5.4 x 2 x 2.3 cm and contained 2  "lower pole solid nodules.  The largest nodule in the left lobe measured 28 x 16 x 24mm lower pole in location.  IMPRESSION:  No significant change from previous in April.           Current Assessment & Plan     F/U Ultrasound Scheduled:  Future Appointments   Date Time Provider Department Center   5/10/2022  3:45 PM [facility] US1 [facility]               Relevant Orders    TSH    Overweight    Current Assessment & Plan     Wt Readings from Last 6 Encounters:   03/25/22 74.8 kg (164 lb 14.5 oz)   01/06/22 74.8 kg (165 lb)   11/30/21 75.2 kg (165 lb 12.6 oz)   10/25/21 76.1 kg (167 lb 12.3 oz)   06/15/21 75.4 kg (166 lb 3.6 oz)   05/10/21 73.5 kg (162 lb 0.6 oz)     Estimated body mass index is 28.31 kg/m² as calculated from the following:    Height as of this encounter: 5' 4" (1.626 m).    Weight as of this encounter: 74.8 kg (164 lb 14.5 oz).             Food intolerance    Current Assessment & Plan     She reports ill-defined stomach discomfort when eating bread and other foods with gluten. We discussed differential diagnosis and risks and benefits of treatment options. It was agreed to proceed with evaluation as ordered. She feels that the severity of symptoms do not warrant additional evaluation/treatment at this point. I instructed her to let me know if she changes her mind.              Relevant Orders    Tissue transglutaminase, IgA    IgA    Benign paroxysmal positional vertigo    Current Assessment & Plan     She describes occasional episodes of mild typical BPV symptoms. Education/instructions were reviewed with her. Refer to "Patient Instructions" section of after visit summary. She feels that the severity of symptoms do not warrant further evaluation or treatment, and she declines any further evaluation and management of this condition at this point. I instructed her to let me know if she changes her mind.             Other Visit Diagnoses     Preventative health care    -  Primary    Relevant " Orders    Glucose, Random    TSH    Lipid Panel    Screening for diabetes mellitus        Relevant Orders    Glucose, Random    Screening for lipid disorders        Relevant Orders    Lipid Panel      Unless noted herein, any chronic conditions are represented as and appear compensated/controlled and stable, and no other significant complaints or concerns were reported.    PRESCRIPTION DRUG MANAGEMENT  Outpatient Medications Prior to Visit   Medication Sig Dispense Refill    cetirizine (ZYRTEC) 10 MG tablet Take 10 mg by mouth once daily.      fexofenadine (ALLEGRA) 180 MG tablet       multivitamin-Ca-iron-minerals Tab Take by mouth once daily.      spironolactone (ALDACTONE) 50 MG tablet Take 1 tablet (50 mg total) by mouth 2 (two) times daily. 60 tablet 3    albuterol (PROAIR HFA) 90 mcg/actuation inhaler Inhale 2 puffs into the lungs every 6 (six) hours as needed for Wheezing or Shortness of Breath (or Cough). 18 g 5    cetirizine (ZYRTEC) 1 mg/mL syrup       fluticasone propionate (FLONASE) 50 mcg/actuation nasal spray 2 sprays (100 mcg total) by Each Nostril route once daily. 16 mL 11    estradioL (ESTRACE) 1 MG tablet Take 1 tablet (1 mg total) by mouth once daily. (Patient not taking: No sig reported) 30 tablet 6    medroxyPROGESTERone (PROVERA) 2.5 MG tablet Take 1 tablet (2.5 mg total) by mouth once daily. (Patient not taking: No sig reported) 30 tablet 6    methylPREDNISolone (MEDROL DOSEPACK) 4 mg tablet Take as directed Orally daily 6 days (Patient not taking: Reported on 3/25/2022) 21 tablet 0    varicella-zoster gE-AS01B, PF, (SHINGRIX) 50 mcg/0.5 mL injection Inject 0.5 mL (one dose) into muscle now; give second dose at least 2 months later (Patient not taking: No sig reported) 1 each 1     No facility-administered medications prior to visit.     Medications Discontinued During This Encounter   Medication Reason    estradioL (ESTRACE) 1 MG tablet Patient no longer taking     medroxyPROGESTERone (PROVERA) 2.5 MG tablet Patient no longer taking    methylPREDNISolone (MEDROL DOSEPACK) 4 mg tablet Patient no longer taking    varicella-zoster gE-AS01B, PF, (SHINGRIX) 50 mcg/0.5 mL injection Patient no longer taking    cetirizine (ZYRTEC) 1 mg/mL syrup Patient no longer taking    fluticasone propionate (FLONASE) 50 mcg/actuation nasal spray Reorder    albuterol (PROAIR HFA) 90 mcg/actuation inhaler Reorder     Medications Ordered This Encounter   Medications    albuterol (PROAIR HFA) 90 mcg/actuation inhaler     Sig: Inhale 2 puffs into the lungs every 6 (six) hours as needed for Wheezing or Shortness of Breath (or Cough).     Dispense:  8.5 g     Refill:  5     -    fluticasone propionate (FLONASE) 50 mcg/actuation nasal spray     Si sprays (100 mcg total) by Each Nostril route once daily.     Dispense:  16 g     Refill:  11     If insurance does not cover, please offer OTC equivalent. OK to vary dispense quantity to correspond with in-stock OTC unit quantities.     Review of Systems   Constitutional: Negative for activity change and unexpected weight change.   HENT: Negative for hearing loss, rhinorrhea and trouble swallowing.    Eyes: Negative for discharge and visual disturbance.   Respiratory: Negative for chest tightness and wheezing.    Cardiovascular: Negative for chest pain and palpitations.   Gastrointestinal: Negative for blood in stool, constipation, diarrhea and vomiting.   Endocrine: Negative for polydipsia and polyuria.   Genitourinary: Negative for difficulty urinating, dysuria, hematuria and menstrual problem.   Musculoskeletal: Negative for arthralgias, joint swelling and neck pain.   Neurological: Negative for weakness.   Psychiatric/Behavioral: Negative for confusion and dysphoric mood.      PHYSICAL EXAM  Vitals:    22 1002   BP: 130/70   BP Location: Left arm   Patient Position: Sitting   BP Method: Medium (Manual)   Pulse: 90   Temp: 97.8 °F (36.6 °C)  "  TempSrc: Tympanic   SpO2: 95%   Weight: 74.8 kg (164 lb 14.5 oz)   Height: 5' 4" (1.626 m)   Physical Exam  Vitals reviewed.   Constitutional:       General: She is not in acute distress.     Appearance: Normal appearance. She is not ill-appearing, toxic-appearing or diaphoretic.   HENT:      Head: Normocephalic and atraumatic.      Right Ear: Tympanic membrane, ear canal and external ear normal.      Left Ear: Tympanic membrane, ear canal and external ear normal.   Eyes:      General: No scleral icterus.     Conjunctiva/sclera: Conjunctivae normal.   Cardiovascular:      Rate and Rhythm: Normal rate and regular rhythm.   Pulmonary:      Effort: Pulmonary effort is normal.      Breath sounds: Normal breath sounds.   Abdominal:      Palpations: Abdomen is soft.      Tenderness: There is no abdominal tenderness.   Skin:     General: Skin is warm and dry.      Capillary Refill: Capillary refill takes less than 2 seconds.   Neurological:      General: No focal deficit present.      Mental Status: She is alert and oriented to person, place, and time. Mental status is at baseline.   Psychiatric:         Mood and Affect: Mood normal.         Behavior: Behavior normal.         Judgment: Judgment normal.          PAST MEDICAL HISTORY  Liana has a past medical history of Acne, Anemia, Herpes simplex type II infection, History of COVID-19 (12/23/2020), History of partial thyroidectomy, Mild intermittent asthma without complication, Multinodular goiter, Multiple thyroid nodules, and Seasonal allergic rhinitis due to pollen.    SURGICAL HISTORY  Liana has a past surgical history that includes FNA thyroid nodule (April 2014); Thyroidectomy, partial (02/2015); Hernia repair; and uterine ablation (12/27/2017).    FAMILY HISTORY  Liaan family history includes Allergic rhinitis in her sister; Asthma in her sister; Cancer in her other; Diabetes in her maternal grandfather; Hypertension in her maternal grandmother and mother; Thyroid " "disease in her mother.     ALLERGIES  Review of patient's allergies indicates:   Allergen Reactions    Adhesive Rash     bandaids    Neosporin [neomycin-bacitracin-polymyxin] Rash       SOCIAL HISTORY  Liana  reports that she has never smoked. She has never used smokeless tobacco. She reports current alcohol use. She reports that she does not use drugs.     Documentation entered by me for this encounter may have been done in part using speech-recognition technology. Although I have made an effort to ensure accuracy, "sound like" errors may exist and should be interpreted in context.   "

## 2022-03-25 NOTE — ASSESSMENT & PLAN NOTE
Lab Results   Component Value Date    TSH 1.640 03/25/2021    TSH 0.930 07/29/2019    TSH 0.992 10/27/2017    FREET4 0.96 08/10/2015    FREET4 1.22 04/17/2014    FREET4 1.29 10/11/2010     Lab Results   Component Value Date    THYROPEROXID <6.0 04/17/2014

## 2022-03-25 NOTE — ASSESSMENT & PLAN NOTE
F/U Ultrasound Scheduled:  Future Appointments   Date Time Provider Department Center   5/10/2022  3:45 PM [facility] US1 [facility]

## 2022-03-25 NOTE — ASSESSMENT & PLAN NOTE
"She describes occasional episodes of mild typical BPV symptoms. Education/instructions were reviewed with her. Refer to "Patient Instructions" section of after visit summary. She feels that the severity of symptoms do not warrant further evaluation or treatment, and she declines any further evaluation and management of this condition at this point. I instructed her to let me know if she changes her mind.  "

## 2022-03-25 NOTE — ASSESSMENT & PLAN NOTE
She reports ill-defined stomach discomfort when eating bread and other foods with gluten. We discussed differential diagnosis and risks and benefits of treatment options. It was agreed to proceed with evaluation as ordered. She feels that the severity of symptoms do not warrant additional evaluation/treatment at this point. I instructed her to let me know if she changes her mind.

## 2022-03-28 LAB — TTG IGA SER-ACNC: 6 UNITS

## 2022-04-21 ENCOUNTER — PATIENT OUTREACH (OUTPATIENT)
Dept: ADMINISTRATIVE | Facility: OTHER | Age: 52
End: 2022-04-21
Payer: COMMERCIAL

## 2022-04-22 ENCOUNTER — OFFICE VISIT (OUTPATIENT)
Dept: DERMATOLOGY | Facility: CLINIC | Age: 52
End: 2022-04-22
Payer: COMMERCIAL

## 2022-04-22 DIAGNOSIS — L70.0 ACNE VULGARIS: Primary | ICD-10-CM

## 2022-04-22 PROCEDURE — 99213 PR OFFICE/OUTPT VISIT, EST, LEVL III, 20-29 MIN: ICD-10-PCS | Mod: 95,,, | Performed by: STUDENT IN AN ORGANIZED HEALTH CARE EDUCATION/TRAINING PROGRAM

## 2022-04-22 PROCEDURE — 99213 OFFICE O/P EST LOW 20 MIN: CPT | Mod: 95,,, | Performed by: STUDENT IN AN ORGANIZED HEALTH CARE EDUCATION/TRAINING PROGRAM

## 2022-04-22 PROCEDURE — 1160F PR REVIEW ALL MEDS BY PRESCRIBER/CLIN PHARMACIST DOCUMENTED: ICD-10-PCS | Mod: CPTII,95,, | Performed by: STUDENT IN AN ORGANIZED HEALTH CARE EDUCATION/TRAINING PROGRAM

## 2022-04-22 PROCEDURE — 1160F RVW MEDS BY RX/DR IN RCRD: CPT | Mod: CPTII,95,, | Performed by: STUDENT IN AN ORGANIZED HEALTH CARE EDUCATION/TRAINING PROGRAM

## 2022-04-22 PROCEDURE — 1159F PR MEDICATION LIST DOCUMENTED IN MEDICAL RECORD: ICD-10-PCS | Mod: CPTII,95,, | Performed by: STUDENT IN AN ORGANIZED HEALTH CARE EDUCATION/TRAINING PROGRAM

## 2022-04-22 PROCEDURE — 1159F MED LIST DOCD IN RCRD: CPT | Mod: CPTII,95,, | Performed by: STUDENT IN AN ORGANIZED HEALTH CARE EDUCATION/TRAINING PROGRAM

## 2022-04-22 RX ORDER — TRETINOIN 0.5 MG/G
CREAM TOPICAL NIGHTLY
Qty: 45 G | Refills: 3 | Status: SHIPPED | OUTPATIENT
Start: 2022-04-22 | End: 2023-11-28

## 2022-04-22 RX ORDER — SPIRONOLACTONE 50 MG/1
50 TABLET, FILM COATED ORAL 2 TIMES DAILY
Qty: 60 TABLET | Refills: 6 | Status: SHIPPED | OUTPATIENT
Start: 2022-04-22 | End: 2023-03-31

## 2022-05-10 ENCOUNTER — HOSPITAL ENCOUNTER (OUTPATIENT)
Dept: RADIOLOGY | Facility: HOSPITAL | Age: 52
Discharge: HOME | End: 2022-05-10
Attending: ORTHOPAEDIC SURGERY
Payer: COMMERCIAL

## 2022-05-10 DIAGNOSIS — E04.2 MULTIPLE THYROID NODULES: ICD-10-CM

## 2022-05-10 PROCEDURE — 76536 US EXAM OF HEAD AND NECK: CPT | Mod: TC

## 2022-05-10 PROCEDURE — 76536 US SOFT TISSUE HEAD NECK THYROID: ICD-10-PCS | Mod: 26,,, | Performed by: RADIOLOGY

## 2022-05-10 PROCEDURE — 76536 US EXAM OF HEAD AND NECK: CPT | Mod: 26,,, | Performed by: RADIOLOGY

## 2022-05-12 ENCOUNTER — TELEPHONE (OUTPATIENT)
Dept: OTOLARYNGOLOGY | Facility: CLINIC | Age: 52
End: 2022-05-12
Payer: COMMERCIAL

## 2022-05-12 NOTE — TELEPHONE ENCOUNTER
Pt returning phone call. Informed Liana of her US results per Dr. Welch and scheduled office visit with Pricila Alamo PA-C for 5/24.

## 2022-05-12 NOTE — TELEPHONE ENCOUNTER
----- Message from Lindsey Welch MD sent at 5/12/2022 12:03 PM CDT -----  Please let Ms. Keane know that her US is stable.  I would recommend an in person exam with me or Pricila/Celi and then will continue with annual US to follow.  Thanks.

## 2022-05-23 ENCOUNTER — TELEPHONE (OUTPATIENT)
Dept: OTOLARYNGOLOGY | Facility: CLINIC | Age: 52
End: 2022-05-23
Payer: COMMERCIAL

## 2022-05-23 ENCOUNTER — PATIENT MESSAGE (OUTPATIENT)
Dept: OTOLARYNGOLOGY | Facility: CLINIC | Age: 52
End: 2022-05-23
Payer: COMMERCIAL

## 2022-05-23 NOTE — TELEPHONE ENCOUNTER
Called to tell pt about appt time change time on 5/24 but pt said that time didn't work for her because of work. Rescheduled her to 5/31

## 2022-05-23 NOTE — TELEPHONE ENCOUNTER
----- Message from Rica Farah sent at 5/23/2022 10:25 AM CDT -----  Contact: Liana mario [phone]  Patient is returning a phone call.  Who left a message for the patient: Libby   Does patient know what this is regarding:    Would you like a call back, or a response through your [facility] portal?: call back  Comments:  Pt was returning the nurses

## 2022-05-31 ENCOUNTER — OFFICE VISIT (OUTPATIENT)
Dept: OTOLARYNGOLOGY | Facility: CLINIC | Age: 52
End: 2022-05-31
Payer: COMMERCIAL

## 2022-05-31 VITALS — TEMPERATURE: 98 F | HEART RATE: 84 BPM | SYSTOLIC BLOOD PRESSURE: 141 MMHG | DIASTOLIC BLOOD PRESSURE: 85 MMHG

## 2022-05-31 DIAGNOSIS — E04.2 MULTIPLE THYROID NODULES: Primary | ICD-10-CM

## 2022-05-31 PROCEDURE — 1159F MED LIST DOCD IN RCRD: CPT | Mod: CPTII,S$GLB,, | Performed by: PHYSICIAN ASSISTANT

## 2022-05-31 PROCEDURE — 99999 PR PBB SHADOW E&M-EST. PATIENT-LVL III: CPT | Mod: PBBFAC,,, | Performed by: PHYSICIAN ASSISTANT

## 2022-05-31 PROCEDURE — 3079F PR MOST RECENT DIASTOLIC BLOOD PRESSURE 80-89 MM HG: ICD-10-PCS | Mod: CPTII,S$GLB,, | Performed by: PHYSICIAN ASSISTANT

## 2022-05-31 PROCEDURE — 99213 OFFICE O/P EST LOW 20 MIN: CPT | Mod: S$GLB,,, | Performed by: PHYSICIAN ASSISTANT

## 2022-05-31 PROCEDURE — 99213 PR OFFICE/OUTPT VISIT, EST, LEVL III, 20-29 MIN: ICD-10-PCS | Mod: S$GLB,,, | Performed by: PHYSICIAN ASSISTANT

## 2022-05-31 PROCEDURE — 1159F PR MEDICATION LIST DOCUMENTED IN MEDICAL RECORD: ICD-10-PCS | Mod: CPTII,S$GLB,, | Performed by: PHYSICIAN ASSISTANT

## 2022-05-31 PROCEDURE — 3077F PR MOST RECENT SYSTOLIC BLOOD PRESSURE >= 140 MM HG: ICD-10-PCS | Mod: CPTII,S$GLB,, | Performed by: PHYSICIAN ASSISTANT

## 2022-05-31 PROCEDURE — 3079F DIAST BP 80-89 MM HG: CPT | Mod: CPTII,S$GLB,, | Performed by: PHYSICIAN ASSISTANT

## 2022-05-31 PROCEDURE — 3077F SYST BP >= 140 MM HG: CPT | Mod: CPTII,S$GLB,, | Performed by: PHYSICIAN ASSISTANT

## 2022-05-31 PROCEDURE — 99999 PR PBB SHADOW E&M-EST. PATIENT-LVL III: ICD-10-PCS | Mod: PBBFAC,,, | Performed by: PHYSICIAN ASSISTANT

## 2022-05-31 NOTE — PROGRESS NOTES
Subjective:   Patient ID: Liana Keane is a 51 y.o. female.    Chief Complaint: No chief complaint on file.    Patient is a very pleasant 51 y.o. female here to see me today for follow up of thyroid nodules. She recently had ultrasound that was stable.  She has a history of a right thyroidectomy due to a benign nodule in 2015.  Surgery was done at [facility] with Dr. Hartman.  She has a family history of thyroid disease, had thyroidectomy due to benign disease.  She does not have difficulty swallowing either solids or liquids.  She also has not noted significant hoarseness or change in their voice.  She denies fatigue, weight changes, heat/cold intolerance, bowel/skin changes or CVS symptoms.      Review of patient's allergies indicates:   Allergen Reactions    Adhesive Rash     bandaids    Neosporin [neomycin-bacitracin-polymyxin] Rash           Review of Systems   Constitutional: Negative.    HENT: Negative.    Eyes: Negative.    Respiratory: Negative.    Cardiovascular: Negative.    Gastrointestinal: Negative.    Endocrine: Negative.    Genitourinary: Negative.    Musculoskeletal: Negative.    Skin: Negative.    Neurological: Negative.    Hematological: Negative.    Psychiatric/Behavioral: Negative.          Objective:   There were no vitals taken for this visit.    Physical Exam  Constitutional:       General: She is not in acute distress.     Appearance: She is well-developed.   HENT:      Head: Normocephalic and atraumatic.      Right Ear: Tympanic membrane, ear canal and external ear normal.      Left Ear: Tympanic membrane, ear canal and external ear normal.      Nose: Nose normal. No nasal deformity, septal deviation, mucosal edema or rhinorrhea.      Right Sinus: No maxillary sinus tenderness or frontal sinus tenderness.      Left Sinus: No maxillary sinus tenderness or frontal sinus tenderness.      Mouth/Throat:      Mouth: Mucous membranes are not pale and not dry.      Dentition: No dental caries.       Pharynx: Uvula midline. No oropharyngeal exudate or posterior oropharyngeal erythema.   Eyes:      General: Lids are normal. No scleral icterus.     Extraocular Movements:      Right eye: Normal extraocular motion and no nystagmus.      Left eye: Normal extraocular motion and no nystagmus.      Conjunctiva/sclera: Conjunctivae normal.      Right eye: Right conjunctiva is not injected. No chemosis.     Left eye: Left conjunctiva is not injected. No chemosis.     Pupils: Pupils are equal, round, and reactive to light.   Neck:      Thyroid: No thyroid mass (Small mass palpated left thyroid lobe) or thyromegaly.      Trachea: Trachea and phonation normal. No tracheal tenderness or tracheal deviation.      Comments: Transverse incision from previous thyroidectomy well-healed  Pulmonary:      Effort: Pulmonary effort is normal. No respiratory distress.      Breath sounds: No stridor.   Abdominal:      General: There is no distension.   Lymphadenopathy:      Head:      Right side of head: No submental, submandibular, preauricular, posterior auricular or occipital adenopathy.      Left side of head: No submental, submandibular, preauricular, posterior auricular or occipital adenopathy.      Cervical: No cervical adenopathy.   Skin:     General: Skin is warm and dry.      Findings: No erythema or rash.   Neurological:      Mental Status: She is alert and oriented to person, place, and time.      Cranial Nerves: No cranial nerve deficit.   Psychiatric:         Behavior: Behavior normal.          Imaging :     Narrative & Impression  EXAMINATION:  US SOFT TISSUE HEAD NECK THYROID     CLINICAL HISTORY:  Nontoxic multinodular goiter     TECHNIQUE:  Ultrasound of the thyroid and cervical lymph nodes was performed.     COMPARISON:  Prior ultrasounds     FINDINGS:  Right lobe: Removed     Left lobe: 6.0 x 2.2 x 3.1 cm     Isthmus: 0.4 cm     Nodules: Lower pole nodule currently measures 3.5 x 2.2 x 2.7 cm, previous 3.5 x 2.0 x  2.8 cm.  Midpole nodule measures 1.2 x 0.6 x 1.0 cm, previous 1.7 x 1.0 x 1.2 cm.  3 mm cyst present within the midpole.     Impression:     No detrimental change        Electronically signed by: Dontae Winkler MD  Date:                                            05/10/2022  Time:                                           15:58    Assessment:     1. Multiple thyroid nodules        Plan:     Multiple thyroid nodules       Reviewed her previous imaging in detail.  Overall, she has not had significant growth in her larger nodule since her last ultrasound in 2014, 2 mm.  Also reviewed her pathology via [facility] and her pathology previously also was benign disease.  Current FNA is of both nodules showed benign follicular cells.  Different management options were discussed, and as she is asymptomatic at this time, she would like to proceed with observation.  Return to clinic in 1 year with repeat ultrasound, sooner if she notes any change in her examination.

## 2022-06-20 ENCOUNTER — PATIENT MESSAGE (OUTPATIENT)
Dept: INTERNAL MEDICINE | Facility: CLINIC | Age: 52
End: 2022-06-20
Payer: COMMERCIAL

## 2022-06-30 ENCOUNTER — OFFICE VISIT (OUTPATIENT)
Dept: URGENT CARE | Facility: CLINIC | Age: 52
End: 2022-06-30
Payer: COMMERCIAL

## 2022-06-30 VITALS
DIASTOLIC BLOOD PRESSURE: 68 MMHG | RESPIRATION RATE: 17 BRPM | HEIGHT: 64 IN | WEIGHT: 166 LBS | SYSTOLIC BLOOD PRESSURE: 120 MMHG | TEMPERATURE: 100 F | HEART RATE: 105 BPM | BODY MASS INDEX: 28.34 KG/M2 | OXYGEN SATURATION: 99 %

## 2022-06-30 DIAGNOSIS — U07.1 COVID-19 VIRUS DETECTED: ICD-10-CM

## 2022-06-30 DIAGNOSIS — U07.1 COVID-19: ICD-10-CM

## 2022-06-30 DIAGNOSIS — R09.81 CONGESTION OF NASAL SINUS: Primary | ICD-10-CM

## 2022-06-30 LAB
CTP QC/QA: YES
SARS-COV-2 RDRP RESP QL NAA+PROBE: POSITIVE

## 2022-06-30 PROCEDURE — 3078F PR MOST RECENT DIASTOLIC BLOOD PRESSURE < 80 MM HG: ICD-10-PCS | Mod: CPTII,S$GLB,, | Performed by: EMERGENCY MEDICINE

## 2022-06-30 PROCEDURE — 1159F PR MEDICATION LIST DOCUMENTED IN MEDICAL RECORD: ICD-10-PCS | Mod: CPTII,S$GLB,, | Performed by: EMERGENCY MEDICINE

## 2022-06-30 PROCEDURE — U0002 COVID-19 LAB TEST NON-CDC: HCPCS | Mod: QW,S$GLB,, | Performed by: EMERGENCY MEDICINE

## 2022-06-30 PROCEDURE — 3074F SYST BP LT 130 MM HG: CPT | Mod: CPTII,S$GLB,, | Performed by: EMERGENCY MEDICINE

## 2022-06-30 PROCEDURE — 99214 OFFICE O/P EST MOD 30 MIN: CPT | Mod: S$GLB,,, | Performed by: EMERGENCY MEDICINE

## 2022-06-30 PROCEDURE — 3074F PR MOST RECENT SYSTOLIC BLOOD PRESSURE < 130 MM HG: ICD-10-PCS | Mod: CPTII,S$GLB,, | Performed by: EMERGENCY MEDICINE

## 2022-06-30 PROCEDURE — 3078F DIAST BP <80 MM HG: CPT | Mod: CPTII,S$GLB,, | Performed by: EMERGENCY MEDICINE

## 2022-06-30 PROCEDURE — 3008F BODY MASS INDEX DOCD: CPT | Mod: CPTII,S$GLB,, | Performed by: EMERGENCY MEDICINE

## 2022-06-30 PROCEDURE — 3008F PR BODY MASS INDEX (BMI) DOCUMENTED: ICD-10-PCS | Mod: CPTII,S$GLB,, | Performed by: EMERGENCY MEDICINE

## 2022-06-30 PROCEDURE — U0002: ICD-10-PCS | Mod: QW,S$GLB,, | Performed by: EMERGENCY MEDICINE

## 2022-06-30 PROCEDURE — 99214 PR OFFICE/OUTPT VISIT, EST, LEVL IV, 30-39 MIN: ICD-10-PCS | Mod: S$GLB,,, | Performed by: EMERGENCY MEDICINE

## 2022-06-30 PROCEDURE — 1160F PR REVIEW ALL MEDS BY PRESCRIBER/CLIN PHARMACIST DOCUMENTED: ICD-10-PCS | Mod: CPTII,S$GLB,, | Performed by: EMERGENCY MEDICINE

## 2022-06-30 PROCEDURE — 1160F RVW MEDS BY RX/DR IN RCRD: CPT | Mod: CPTII,S$GLB,, | Performed by: EMERGENCY MEDICINE

## 2022-06-30 PROCEDURE — 1159F MED LIST DOCD IN RCRD: CPT | Mod: CPTII,S$GLB,, | Performed by: EMERGENCY MEDICINE

## 2022-06-30 NOTE — PROGRESS NOTES
"Subjective:       Patient ID: Liana Keane is a 51 y.o. female.    Vitals:  height is 5' 4" (1.626 m) and weight is 75.3 kg (166 lb). Her temperature is 99.7 °F (37.6 °C). Her blood pressure is 120/68 and her pulse is 105. Her respiration is 17 and oxygen saturation is 99%.     Chief Complaint: Nasal Congestion (Headache cant keep head up ears ringing - Entered by patient)    Patient present in office for congestion and headache.    Headache   This is a new problem. The current episode started today. The problem occurs constantly. The problem has been gradually worsening. The pain is located in the frontal region. The quality of the pain is described as throbbing. The pain is at a severity of 6/10. Associated symptoms include coughing and a fever. Nothing aggravates the symptoms. She has tried nothing (Allergy, Flonase) for the symptoms. The treatment provided no relief. There is no history of cancer, cluster headaches, hypertension, immunosuppression, migraine headaches, migraines in the family, obesity, pseudotumor cerebri, recent head traumas, sinus disease or TMJ.       Constitution: Positive for fever.   Respiratory: Positive for cough.    Neurological: Positive for headaches. Negative for history of migraines.       Objective:      Physical Exam   Constitutional: She is oriented to person, place, and time. She appears well-developed. She is cooperative.  Non-toxic appearance. She does not appear ill. No distress.   HENT:   Head: Normocephalic and atraumatic.   Ears:   Right Ear: Hearing, tympanic membrane, external ear and ear canal normal.   Left Ear: Hearing, tympanic membrane, external ear and ear canal normal.   Nose: Nose normal. No mucosal edema, rhinorrhea or nasal deformity. No epistaxis. Right sinus exhibits no maxillary sinus tenderness and no frontal sinus tenderness. Left sinus exhibits no maxillary sinus tenderness and no frontal sinus tenderness.   Mouth/Throat: Uvula is midline, " oropharynx is clear and moist and mucous membranes are normal. No trismus in the jaw. Normal dentition. No uvula swelling. No oropharyngeal exudate, posterior oropharyngeal edema or posterior oropharyngeal erythema.   Eyes: Conjunctivae and lids are normal. No scleral icterus.   Neck: Trachea normal and phonation normal. Neck supple. No edema present. No erythema present. No neck rigidity present.   Cardiovascular: Normal rate, regular rhythm, normal heart sounds and normal pulses.   Pulmonary/Chest: Effort normal and breath sounds normal. No respiratory distress. She has no decreased breath sounds. She has no rhonchi.   Abdominal: Normal appearance.   Musculoskeletal: Normal range of motion.         General: No deformity. Normal range of motion.   Neurological: She is alert and oriented to person, place, and time. She exhibits normal muscle tone. Coordination normal.   Skin: Skin is warm, dry, intact, not diaphoretic and not pale.   Psychiatric: Her speech is normal and behavior is normal. Judgment and thought content normal.   Nursing note and vitals reviewed.    Results for orders placed or performed in visit on 06/30/22   POCT COVID-19 Rapid Screening   Result Value Ref Range    POC Rapid COVID Positive (A) Negative     Acceptable Yes          Patient has no risk factors for for COVID hospitalization.  Will treat symptomatically.          Assessment:       1. Congestion of nasal sinus    2. COVID-19          Plan:         Congestion of nasal sinus  -     POCT COVID-19 Rapid Screening    COVID-19

## 2022-06-30 NOTE — LETTER
[address] ? [place], [address] ? Phone [phone] ? Fax             Return to Work/School    Patient: Liana Keane  YOB: 1970   Date: 06/30/2022      To Whom It May Concern:       Liana Keane was in contact with/seen in my office on 06/30/2022. COVID-19 is present in our communities across the state. Not all patients are eligible or appropriate to be tested. In this situation, your employee meets the following criteria:     Liana Keane has met the criteria for COVID-19 testing and has a POSITIVE result. She can return to work once they are asymptomatic for 24 hours without the use of fever reducing medications AND at least five days from the start of symptoms (or from the first positive result if they have no symptoms).      If you have any questions or concerns, or if I can be of further assistance, please do not hesitate to contact me.     Sincerely,    Ashutosh Zaragoza MD

## 2022-07-03 ENCOUNTER — TELEPHONE (OUTPATIENT)
Dept: URGENT CARE | Facility: CLINIC | Age: 52
End: 2022-07-03
Payer: COMMERCIAL

## 2022-07-25 ENCOUNTER — PATIENT MESSAGE (OUTPATIENT)
Dept: RESEARCH | Facility: HOSPITAL | Age: 52
End: 2022-07-25
Payer: COMMERCIAL

## 2022-08-25 ENCOUNTER — OFFICE VISIT (OUTPATIENT)
Dept: FAMILY MEDICINE | Facility: CLINIC | Age: 52
End: 2022-08-25
Payer: COMMERCIAL

## 2022-08-25 DIAGNOSIS — N39.0 ACUTE UTI (URINARY TRACT INFECTION): Primary | ICD-10-CM

## 2022-08-25 PROCEDURE — 99212 OFFICE O/P EST SF 10 MIN: CPT | Mod: 95,,, | Performed by: REGISTERED NURSE

## 2022-08-25 PROCEDURE — 99212 PR OFFICE/OUTPT VISIT, EST, LEVL II, 10-19 MIN: ICD-10-PCS | Mod: 95,,, | Performed by: REGISTERED NURSE

## 2022-08-25 RX ORDER — NITROFURANTOIN 25; 75 MG/1; MG/1
100 CAPSULE ORAL 2 TIMES DAILY
Qty: 14 CAPSULE | Refills: 0 | Status: SHIPPED | OUTPATIENT
Start: 2022-08-25 | End: 2022-09-01

## 2022-08-25 NOTE — PROGRESS NOTES
PRIMARY CARE TELEMEDICINE NOTE    [place] location:  [place]  Visit type: Virtual visit with synchronous audio and video  Each patient to whom he or she provides medical services by telemedicine is:  (1) informed of the relationship between the physician and patient and the respective role of any other health care provider with respect to management of the patient  (2) notified that he or she may decline to receive medical services by telemedicine and may withdraw from such care at any time      CHIEF COMPLAINT ---- POSSIBLE UTI    Liana is seen virtually today for c/o possible UTI.  Onset of s/s yesterday, no better today.  Reports burning at end of urination, urgency and frequency.  Not enough water lately, has increased intake today.  No chills, fever, NVD or flank pain.      Review of Systems   Constitutional: Negative for chills and fever.   Respiratory: Negative.    Cardiovascular: Negative.    Gastrointestinal: Negative for constipation, nausea and vomiting.   Genitourinary: Positive for dysuria, frequency, pelvic pain and urgency. Negative for flank pain and hematuria.   Musculoskeletal: Negative for back pain, gait problem and joint swelling.   Skin: Negative for rash.   Neurological: Negative.           Review of patient's allergies indicates:   Allergen Reactions    Adhesive Rash    Neosporin [neomycin-bacitracin-polymyxin] Rash         Patient Active Problem List   Diagnosis    Seasonal allergic rhinitis due to pollen    Mild intermittent asthma without complication    Herpes simplex type II infection    Menorrhagia with regular cycle    Overweight    History of COVID-19 (12/23/2020)    History of partial thyroidectomy    Multiple thyroid nodules    Insomnia since COVID-19 infection in December, 2020    Food intolerance    Benign paroxysmal positional vertigo         Current Outpatient Medications:     albuterol (PROAIR HFA) 90 mcg/actuation inhaler, Inhale 2 puffs into the lungs every 6  (six) hours as needed for Wheezing or Shortness of Breath (or Cough)., Disp: 8.5 g, Rfl: 5    cetirizine (ZYRTEC) 10 MG tablet, Take 10 mg by mouth once daily., Disp: , Rfl:     fexofenadine (ALLEGRA) 180 MG tablet, , Disp: , Rfl:     fluticasone propionate (FLONASE) 50 mcg/actuation nasal spray, 2 sprays (100 mcg total) by Each Nostril route once daily., Disp: 16 g, Rfl: 11    multivitamin-Ca-iron-minerals Tab, Take by mouth once daily., Disp: , Rfl:     spironolactone (ALDACTONE) 50 MG tablet, Take 1 tablet (50 mg total) by mouth 2 (two) times daily., Disp: 60 tablet, Rfl: 6    tretinoin (RETIN-A) 0.05 % cream, Apply topically every evening., Disp: 45 g, Rfl: 3      Past medical, surgical, family and social histories have been reviewed today.      OBJECTIVE:  Constitutional:  In NAD, pleasant, cooperative.  Appears well-developed and well-nourished.   Respiratory:  Unlabored, in no resp distress.  Neurological: Alert and oriented to person, place, and time.   Psychiatric: Normal mood and affect, behavior is normal. Judgment and thought content normal.       Complete PE deferred due to video visit      DIAGNOSIS/ASSESSMENT:    1. Acute UTI (urinary tract infection)  - nitrofurantoin, macrocrystal-monohydrate, (MACROBID) 100 MG capsule; Take 1 capsule (100 mg total) by mouth 2 (two) times daily. for 7 days  Dispense: 14 capsule; Refill: 0      PLAN:    Antibiotic as ordered.  Infection triggers and prevention.  Monitor.    FOLLOW-UP:    Contact office back in 2 to 3 days if worse or no better.  Will need to give a urine specimen.  RTC prn.          15 minutes of total time spent on the encounter, which includes face to face time and non-face to face time preparing to see the patient (eg, review of tests), obtaining and/or reviewing separately obtained history, and documenting clinical information in the electronic or other health record.  Also includes independent interpretation of results (not separately  reported) and communicating results to the patient/family/caregiver, with care coordination (not separately reported).

## 2022-11-08 ENCOUNTER — OFFICE VISIT (OUTPATIENT)
Dept: INTERNAL MEDICINE | Facility: CLINIC | Age: 52
End: 2022-11-08
Payer: COMMERCIAL

## 2022-11-08 VITALS
HEART RATE: 69 BPM | HEIGHT: 64 IN | DIASTOLIC BLOOD PRESSURE: 60 MMHG | TEMPERATURE: 98 F | SYSTOLIC BLOOD PRESSURE: 112 MMHG | BODY MASS INDEX: 27.06 KG/M2 | OXYGEN SATURATION: 100 % | WEIGHT: 158.5 LBS

## 2022-11-08 DIAGNOSIS — Z12.31 BREAST CANCER SCREENING BY MAMMOGRAM: ICD-10-CM

## 2022-11-08 DIAGNOSIS — Z23 NEED FOR PNEUMOCOCCAL VACCINE: ICD-10-CM

## 2022-11-08 DIAGNOSIS — Z23 NEED FOR COVID-19 VACCINE: ICD-10-CM

## 2022-11-08 DIAGNOSIS — R30.0 DYSURIA: Primary | ICD-10-CM

## 2022-11-08 DIAGNOSIS — Z23 NEED FOR INFLUENZA VACCINATION: ICD-10-CM

## 2022-11-08 LAB
BILIRUB UR QL STRIP: NEGATIVE
CLARITY UR: CLEAR
COLOR UR: NORMAL
GLUCOSE UR QL STRIP: NEGATIVE
HGB UR QL STRIP: NEGATIVE
KETONES UR QL STRIP: NEGATIVE
LEUKOCYTE ESTERASE UR QL STRIP: NEGATIVE
NITRITE UR QL STRIP: NEGATIVE
PH UR STRIP: 7 [PH] (ref 5–8)
PROT UR QL STRIP: NEGATIVE
SP GR UR STRIP: 1.01 (ref 1–1.03)
URN SPEC COLLECT METH UR: NORMAL

## 2022-11-08 PROCEDURE — 3008F PR BODY MASS INDEX (BMI) DOCUMENTED: ICD-10-PCS | Mod: CPTII,S$GLB,, | Performed by: FAMILY MEDICINE

## 2022-11-08 PROCEDURE — 99999 PR PBB SHADOW E&M-EST. PATIENT-LVL IV: ICD-10-PCS | Mod: PBBFAC,,, | Performed by: FAMILY MEDICINE

## 2022-11-08 PROCEDURE — 1159F PR MEDICATION LIST DOCUMENTED IN MEDICAL RECORD: ICD-10-PCS | Mod: CPTII,S$GLB,, | Performed by: FAMILY MEDICINE

## 2022-11-08 PROCEDURE — 1160F RVW MEDS BY RX/DR IN RCRD: CPT | Mod: CPTII,S$GLB,, | Performed by: FAMILY MEDICINE

## 2022-11-08 PROCEDURE — 3078F DIAST BP <80 MM HG: CPT | Mod: CPTII,S$GLB,, | Performed by: FAMILY MEDICINE

## 2022-11-08 PROCEDURE — 3074F PR MOST RECENT SYSTOLIC BLOOD PRESSURE < 130 MM HG: ICD-10-PCS | Mod: CPTII,S$GLB,, | Performed by: FAMILY MEDICINE

## 2022-11-08 PROCEDURE — 3078F PR MOST RECENT DIASTOLIC BLOOD PRESSURE < 80 MM HG: ICD-10-PCS | Mod: CPTII,S$GLB,, | Performed by: FAMILY MEDICINE

## 2022-11-08 PROCEDURE — 99999 PR PBB SHADOW E&M-EST. PATIENT-LVL IV: CPT | Mod: PBBFAC,,, | Performed by: FAMILY MEDICINE

## 2022-11-08 PROCEDURE — 3074F SYST BP LT 130 MM HG: CPT | Mod: CPTII,S$GLB,, | Performed by: FAMILY MEDICINE

## 2022-11-08 PROCEDURE — 3008F BODY MASS INDEX DOCD: CPT | Mod: CPTII,S$GLB,, | Performed by: FAMILY MEDICINE

## 2022-11-08 PROCEDURE — 81003 URINALYSIS AUTO W/O SCOPE: CPT | Performed by: FAMILY MEDICINE

## 2022-11-08 PROCEDURE — 99213 OFFICE O/P EST LOW 20 MIN: CPT | Mod: S$GLB,,, | Performed by: FAMILY MEDICINE

## 2022-11-08 PROCEDURE — 1160F PR REVIEW ALL MEDS BY PRESCRIBER/CLIN PHARMACIST DOCUMENTED: ICD-10-PCS | Mod: CPTII,S$GLB,, | Performed by: FAMILY MEDICINE

## 2022-11-08 PROCEDURE — 1159F MED LIST DOCD IN RCRD: CPT | Mod: CPTII,S$GLB,, | Performed by: FAMILY MEDICINE

## 2022-11-08 PROCEDURE — 99213 PR OFFICE/OUTPT VISIT, EST, LEVL III, 20-29 MIN: ICD-10-PCS | Mod: S$GLB,,, | Performed by: FAMILY MEDICINE

## 2022-11-08 RX ORDER — NITROFURANTOIN MACROCRYSTALS 50 MG/1
100 CAPSULE ORAL EVERY 12 HOURS
Qty: 20 CAPSULE | Refills: 0 | Status: SHIPPED | OUTPATIENT
Start: 2022-11-08 | End: 2022-11-13

## 2022-11-08 RX ORDER — CEFDINIR 300 MG/1
300 CAPSULE ORAL 2 TIMES DAILY
COMMUNITY
Start: 2022-11-03 | End: 2022-11-08 | Stop reason: ALTCHOICE

## 2022-11-08 NOTE — PATIENT INSTRUCTIONS
I recommend that you get the new bi-valent COVID-19 vaccine booster that protects against the Omicron sub-variants of COVID-19.    People who are fully vaccinated are much better protected from severe illness (hospitalization and death) from COVID-19 than people who are unvaccinated or not fully vaccinated.    You can learn more about the COVID-19 vaccine and booster shot options at [email or web address]://[email or web address]/[email or web address]-faqs.    You can schedule an appointment for your COVID-19 booster vaccination with [facility] or by calling [phone].    Please take care of yourself. You are important to me.

## 2022-11-08 NOTE — ASSESSMENT & PLAN NOTE
In August she had urinary tract infection symptoms that was treated with nitrofurantoin for seven days, and that resolved her symptoms.  On October 13, she developed recurrence of these symptoms and she had a [facility] encounter and they prescribed her some symptomatic medications without antibiotics, and this provided no relief.  She since went to urgent care and was given a prescription for cefdinir 300 mg by mouth twice daily on November 3rd, and she says that this has made no difference.  She describes her symptoms as a sense of discomfort that lasts for a few minutes after urination, but no pain with urination.  No blood in urine.  No fever.  No pelvic pain.  No flank pain. We discussed differential diagnosis and risks and benefits of treatment options. It was agreed to proceed with evaluation as ordered and empiric treatment as prescribed. She is to let me know if her symptoms worsen or fail to improve as expected.

## 2022-11-08 NOTE — PROGRESS NOTES
OFFICE VISIT 11/8/22  2:30 PM CST    CHIEF COMPLAINT: Urinary Tract Infection    ASSESSMENT & PLAN  1. Atypical dysuria  Assessment & Plan:  In August she had urinary tract infection symptoms that was treated with nitrofurantoin for seven days, and that resolved her symptoms.  On October 13, she developed recurrence of these symptoms and she had a [facility] encounter and they prescribed her some symptomatic medications without antibiotics, and this provided no relief.  She since went to urgent care and was given a prescription for cefdinir 300 mg by mouth twice daily on November 3rd, and she says that this has made no difference.  She describes her symptoms as a sense of discomfort that lasts for a few minutes after urination, but no pain with urination.  No blood in urine.  No fever.  No pelvic pain.  No flank pain. We discussed differential diagnosis and risks and benefits of treatment options. It was agreed to proceed with evaluation as ordered and empiric treatment as prescribed. She is to let me know if her symptoms worsen or fail to improve as expected.     Orders:  -     Urinalysis, Reflex to Urine Culture Urine, Clean Catch  -     nitrofurantoin (MACRODANTIN) 50 MG capsule; Take 2 capsules (100 mg total) by mouth every 12 (twelve) hours. for 5 days  Dispense: 20 capsule; Refill: 0    2. Breast cancer screening by mammogram  -     Mammo Digital Screening [facility] w/ [facility]; Future; Expected date: 01/02/2023    3. Need for influenza vaccination    4. Need for COVID-19 vaccine    5. Need for pneumococcal vaccine    Unless noted herein, any chronic conditions are represented as and appear compensated/controlled and stable, and no other significant complaints or concerns were reported.  --------------------------------------------------------------------------------   YORDAN MENDEZ (MRN [id number], APPT: 11/8/22  2:30 PM CST)  --------------------------------  Ready to check out. See orders.  Clinic collect lab  (urine).  Send to check out.  Thanks.  --------------------------------------------------------------------------------  Orders Placed This Encounter    Mammo Digital Screening [facility] w/ [facility]    Urinalysis, Reflex to Urine Culture Urine, Clean Catch    nitrofurantoin (MACRODANTIN) 50 MG capsule       Follow up if symptoms worsen or fail to improve.   Future Appointments   Date Time Provider Department Center   12/5/2022  4:15 PM Rik Francisco MD [facility] [place]   3/31/2023 10:00 AM LATISHA Simmons MD [facility]       PRESCRIPTION DRUG MANAGEMENT  Outpatient Medications Prior to Visit   Medication Sig Dispense Refill    albuterol (PROAIR HFA) 90 mcg/actuation inhaler Inhale 2 puffs into the lungs every 6 (six) hours as needed for Wheezing or Shortness of Breath (or Cough). 8.5 g 5    cetirizine (ZYRTEC) 10 MG tablet Take 10 mg by mouth once daily.      fexofenadine (ALLEGRA) 180 MG tablet       fluticasone propionate (FLONASE) 50 mcg/actuation nasal spray 2 sprays (100 mcg total) by Each Nostril route once daily. 16 g 11    multivitamin-Ca-iron-minerals Tab Take by mouth once daily.      spironolactone (ALDACTONE) 50 MG tablet Take 1 tablet (50 mg total) by mouth 2 (two) times daily. 60 tablet 6    tretinoin (RETIN-A) 0.05 % cream Apply topically every evening. (Patient not taking: Reported on 11/8/2022) 45 g 3    cefdinir (OMNICEF) 300 MG capsule Take 300 mg by mouth 2 (two) times daily.       No facility-administered medications prior to visit.     Medications Discontinued During This Encounter   Medication Reason    cefdinir (OMNICEF) 300 MG capsule Alternate therapy     Medications Ordered This Encounter   Medications    nitrofurantoin (MACRODANTIN) 50 MG capsule     Sig: Take 2 capsules (100 mg total) by mouth every 12 (twelve) hours. for 5 days     Dispense:  20 capsule     Refill:  0     PHYSICAL EXAM  Vitals:    11/08/22 1418   BP: 112/60   BP Location: Left arm   Patient Position: Sitting  "  BP Method: Medium (Manual)   Pulse: 69   Temp: 98.1 °F (36.7 °C)   TempSrc: Tympanic   SpO2: 100%   Weight: 71.9 kg (158 lb 8.2 oz)   Height: 5' 4" (1.626 m)   Physical Exam  Vitals reviewed.   Constitutional:       General: She is not in acute distress.     Appearance: Normal appearance. She is not ill-appearing or diaphoretic.   Cardiovascular:      Rate and Rhythm: Normal rate and regular rhythm.      Heart sounds: Normal heart sounds.   Pulmonary:      Effort: Pulmonary effort is normal.      Breath sounds: Normal breath sounds.   Abdominal:      Palpations: Abdomen is soft.      Tenderness: There is no abdominal tenderness.   Skin:     General: Skin is warm and dry.   Neurological:      Mental Status: She is alert and oriented to person, place, and time. Mental status is at baseline.   Psychiatric:         Mood and Affect: Mood normal.         Behavior: Behavior normal.         Judgment: Judgment normal.        Documentation entered by me for this encounter may have been done in part using speech-recognition technology. Although I have made an effort to ensure accuracy, "sound like" errors may exist and should be interpreted in context.   "

## 2022-12-05 ENCOUNTER — OFFICE VISIT (OUTPATIENT)
Dept: OBSTETRICS AND GYNECOLOGY | Facility: CLINIC | Age: 52
End: 2022-12-05
Payer: COMMERCIAL

## 2022-12-05 VITALS
DIASTOLIC BLOOD PRESSURE: 72 MMHG | BODY MASS INDEX: 26.98 KG/M2 | WEIGHT: 157.19 LBS | SYSTOLIC BLOOD PRESSURE: 110 MMHG

## 2022-12-05 DIAGNOSIS — N89.8 VAGINAL IRRITATION: ICD-10-CM

## 2022-12-05 DIAGNOSIS — Z01.419 WELL WOMAN EXAM WITH ROUTINE GYNECOLOGICAL EXAM: Primary | ICD-10-CM

## 2022-12-05 PROCEDURE — 3078F DIAST BP <80 MM HG: CPT | Mod: CPTII,S$GLB,, | Performed by: NURSE PRACTITIONER

## 2022-12-05 PROCEDURE — 1159F MED LIST DOCD IN RCRD: CPT | Mod: CPTII,S$GLB,, | Performed by: NURSE PRACTITIONER

## 2022-12-05 PROCEDURE — 3008F PR BODY MASS INDEX (BMI) DOCUMENTED: ICD-10-PCS | Mod: CPTII,S$GLB,, | Performed by: NURSE PRACTITIONER

## 2022-12-05 PROCEDURE — 99999 PR PBB SHADOW E&M-EST. PATIENT-LVL III: CPT | Mod: PBBFAC,,, | Performed by: NURSE PRACTITIONER

## 2022-12-05 PROCEDURE — 99396 PR PREVENTIVE VISIT,EST,40-64: ICD-10-PCS | Mod: S$GLB,,, | Performed by: NURSE PRACTITIONER

## 2022-12-05 PROCEDURE — 3078F PR MOST RECENT DIASTOLIC BLOOD PRESSURE < 80 MM HG: ICD-10-PCS | Mod: CPTII,S$GLB,, | Performed by: NURSE PRACTITIONER

## 2022-12-05 PROCEDURE — 99999 PR PBB SHADOW E&M-EST. PATIENT-LVL III: ICD-10-PCS | Mod: PBBFAC,,, | Performed by: NURSE PRACTITIONER

## 2022-12-05 PROCEDURE — 3074F PR MOST RECENT SYSTOLIC BLOOD PRESSURE < 130 MM HG: ICD-10-PCS | Mod: CPTII,S$GLB,, | Performed by: NURSE PRACTITIONER

## 2022-12-05 PROCEDURE — 3074F SYST BP LT 130 MM HG: CPT | Mod: CPTII,S$GLB,, | Performed by: NURSE PRACTITIONER

## 2022-12-05 PROCEDURE — 1159F PR MEDICATION LIST DOCUMENTED IN MEDICAL RECORD: ICD-10-PCS | Mod: CPTII,S$GLB,, | Performed by: NURSE PRACTITIONER

## 2022-12-05 PROCEDURE — 3008F BODY MASS INDEX DOCD: CPT | Mod: CPTII,S$GLB,, | Performed by: NURSE PRACTITIONER

## 2022-12-05 PROCEDURE — 99396 PREV VISIT EST AGE 40-64: CPT | Mod: S$GLB,,, | Performed by: NURSE PRACTITIONER

## 2022-12-05 PROCEDURE — 1160F RVW MEDS BY RX/DR IN RCRD: CPT | Mod: CPTII,S$GLB,, | Performed by: NURSE PRACTITIONER

## 2022-12-05 PROCEDURE — 81514 NFCT DS BV&VAGINITIS DNA ALG: CPT | Performed by: NURSE PRACTITIONER

## 2022-12-05 PROCEDURE — 1160F PR REVIEW ALL MEDS BY PRESCRIBER/CLIN PHARMACIST DOCUMENTED: ICD-10-PCS | Mod: CPTII,S$GLB,, | Performed by: NURSE PRACTITIONER

## 2022-12-05 NOTE — PROGRESS NOTES
Subjective:       Patient ID: Liana Keane is a 52 y.o. female.    Chief Complaint:  Well Woman    No LMP recorded. Patient is perimenopausal.  History of Present Illness  Annual Exam-Premenopausal  Patient presents for annual exam. The patient is sexually active. GYN screening history: last pap: approximate date 21 and was normal and last mammogram: approximate date 22 and was normal. The patient wears seatbelts: yes. The patient participates in regular exercise: no. Has the patient ever been transfused or tattooed?: no. The patient reports that there is not domestic violence in her life.  Reports experiencing increased vaginal irritation for 2 weeks.  Has noticed slight increase in vaginal discharge.  Denies vaginal itching or odor.    OB History    Para Term  AB Living   4 2 2   2 2   SAB IAB Ectopic Multiple Live Births   1 1     1      # Outcome Date GA Lbr Shahram/2nd Weight Sex Delivery Anes PTL Lv   4 IAB            3 SAB            2 Term     M Vag-Spont      1 Term     M Vag-Spont   BALDOMERO       Review of Systems  Review of Systems   Constitutional:  Negative for appetite change, fatigue, fever and unexpected weight change.   Eyes:  Negative for visual disturbance.   Cardiovascular:  Negative for chest pain.   Gastrointestinal:  Negative for abdominal pain, bloating, constipation, diarrhea, nausea and vomiting.   Genitourinary:  Positive for vaginal discharge. Negative for bladder incontinence, dysmenorrhea, dyspareunia, dysuria, flank pain, frequency, genital sores, menorrhagia, menstrual problem, pelvic pain, urgency, vaginal bleeding, vaginal pain, postcoital bleeding, vaginal dryness and vaginal odor.        Vaginal irritation   Integumentary:  Negative for rash, acne, mole/lesion, breast mass, nipple discharge, breast skin changes and breast tenderness.   Neurological:  Negative for syncope and headaches.   Hematological:  Negative for adenopathy. Does not bruise/bleed easily.    All other systems reviewed and are negative.  Breast: Positive for breast self exam.Negative for asymmetry, lump, mass, nipple discharge, skin changes and tenderness         Objective:      Physical Exam:   Constitutional: She is oriented to person, place, and time. She appears well-developed and well-nourished.    HENT:   Head: Normocephalic and atraumatic.    Eyes: Pupils are equal, round, and reactive to light. Conjunctivae and EOM are normal.     Cardiovascular:  Normal rate and regular rhythm.             Pulmonary/Chest: Effort normal. Right breast exhibits no inverted nipple, no mass, no nipple discharge, no skin change, no tenderness, no bleeding and no swelling. Left breast exhibits no inverted nipple, no mass, no nipple discharge, no skin change, no tenderness, no bleeding and no swelling. Breasts are symmetrical.        Abdominal: Soft. Hernia confirmed negative in the right inguinal area and confirmed negative in the left inguinal area.     Genitourinary:    Inguinal canal, uterus, right adnexa, left adnexa and rectum normal.      Pelvic exam was performed with patient supine.   The external female genitalia was normal.   Genitalia hair distrobution normal .   Labial bartholins normal.There is no rash, tenderness, lesion or injury on the right labia. There is no rash, tenderness, lesion or injury on the left labia. Cervix is normal. There is vaginal discharge (White, creamy) in the vagina. No erythema, bleeding, rectocele, cystocele or unspecified prolapse of vaginal walls in the vagina. Cervix exhibits no motion tenderness and no friability. Uterus is not tender.           Musculoskeletal: Normal range of motion and moves all extremeties.      Lymphadenopathy: No inguinal adenopathy noted on the right or left side.    Neurological: She is alert and oriented to person, place, and time.    Skin: Skin is warm and dry. No rash noted. No erythema.    Psychiatric: She has a normal mood and affect. Her  behavior is normal. Judgment and thought content normal.          Assessment:     1. Well woman exam with routine gynecological exam    2. Vaginal irritation              Plan:   Liana was seen today for well woman.    Diagnoses and all orders for this visit:    Well woman exam with routine gynecological exam    Vaginal irritation  -     Vaginosis Screen by DNA Probe    Will follow vaginosis screen and treat as indicated.  Follow up with me in 1 year for annual well woman exam.

## 2022-12-06 ENCOUNTER — HOSPITAL ENCOUNTER (OUTPATIENT)
Dept: RADIOLOGY | Facility: HOSPITAL | Age: 52
Discharge: HOME | End: 2022-12-06
Attending: FAMILY MEDICINE
Payer: COMMERCIAL

## 2022-12-06 DIAGNOSIS — Z12.31 BREAST CANCER SCREENING BY MAMMOGRAM: ICD-10-CM

## 2022-12-07 LAB
BACTERIAL VAGINOSIS DNA: NEGATIVE
CANDIDA GLABRATA DNA: NEGATIVE
CANDIDA KRUSEI DNA: NEGATIVE
CANDIDA RRNA VAG QL PROBE: NEGATIVE
T VAGINALIS RRNA GENITAL QL PROBE: NEGATIVE

## 2022-12-17 ENCOUNTER — PATIENT MESSAGE (OUTPATIENT)
Dept: INTERNAL MEDICINE | Facility: CLINIC | Age: 52
End: 2022-12-17
Payer: COMMERCIAL

## 2022-12-17 DIAGNOSIS — R30.0 DYSURIA: Primary | ICD-10-CM

## 2023-01-02 ENCOUNTER — PATIENT MESSAGE (OUTPATIENT)
Dept: INTERNAL MEDICINE | Facility: CLINIC | Age: 53
End: 2023-01-02
Payer: COMMERCIAL

## 2023-01-09 ENCOUNTER — PATIENT MESSAGE (OUTPATIENT)
Dept: INTERNAL MEDICINE | Facility: CLINIC | Age: 53
End: 2023-01-09
Payer: COMMERCIAL

## 2023-01-09 NOTE — TELEPHONE ENCOUNTER
Liana Mcfadden.    It was brought to my attention that I had not yet ordered the urology referral you requested. I am so sorry for the delay in my response. Please forgive me.    I have entered a referral order for you to be evaluated by one of our excellent urologists. You should be able to schedule the appointment using [facility] or by calling our appointment desk at [phone]. If you have any difficulty, send my staff a message, and they will be glad to help.     All the best,    LATISHA Simmons MD    Orders Placed This Encounter   Procedures    Ambulatory referral/consult to Urology

## 2023-01-17 ENCOUNTER — HOSPITAL ENCOUNTER (OUTPATIENT)
Dept: RADIOLOGY | Facility: HOSPITAL | Age: 53
Discharge: HOME | End: 2023-01-17
Attending: FAMILY MEDICINE
Payer: COMMERCIAL

## 2023-01-17 VITALS — BODY MASS INDEX: 26.72 KG/M2 | WEIGHT: 156.5 LBS | HEIGHT: 64 IN

## 2023-01-17 PROCEDURE — 77067 SCR MAMMO BI INCL CAD: CPT | Mod: 26,,, | Performed by: RADIOLOGY

## 2023-01-17 PROCEDURE — 77067 MAMMO DIGITAL SCREENING BILAT WITH TOMO: ICD-10-PCS | Mod: 26,,, | Performed by: RADIOLOGY

## 2023-01-17 PROCEDURE — 77063 MAMMO DIGITAL SCREENING BILAT WITH TOMO: ICD-10-PCS | Mod: 26,,, | Performed by: RADIOLOGY

## 2023-01-17 PROCEDURE — 77067 SCR MAMMO BI INCL CAD: CPT | Mod: TC

## 2023-01-17 PROCEDURE — 77063 BREAST TOMOSYNTHESIS BI: CPT | Mod: 26,,, | Performed by: RADIOLOGY

## 2023-01-23 ENCOUNTER — OFFICE VISIT (OUTPATIENT)
Dept: UROLOGY | Facility: CLINIC | Age: 53
End: 2023-01-23
Payer: COMMERCIAL

## 2023-01-23 VITALS
RESPIRATION RATE: 18 BRPM | TEMPERATURE: 97 F | BODY MASS INDEX: 27.4 KG/M2 | HEIGHT: 64 IN | SYSTOLIC BLOOD PRESSURE: 144 MMHG | DIASTOLIC BLOOD PRESSURE: 93 MMHG | HEART RATE: 79 BPM | WEIGHT: 160.5 LBS

## 2023-01-23 DIAGNOSIS — R30.0 DYSURIA: Primary | ICD-10-CM

## 2023-01-23 PROCEDURE — 3008F BODY MASS INDEX DOCD: CPT | Mod: CPTII,S$GLB,, | Performed by: NURSE PRACTITIONER

## 2023-01-23 PROCEDURE — 1159F PR MEDICATION LIST DOCUMENTED IN MEDICAL RECORD: ICD-10-PCS | Mod: CPTII,S$GLB,, | Performed by: NURSE PRACTITIONER

## 2023-01-23 PROCEDURE — 99204 OFFICE O/P NEW MOD 45 MIN: CPT | Mod: S$GLB,,, | Performed by: NURSE PRACTITIONER

## 2023-01-23 PROCEDURE — 3077F SYST BP >= 140 MM HG: CPT | Mod: CPTII,S$GLB,, | Performed by: NURSE PRACTITIONER

## 2023-01-23 PROCEDURE — 99999 PR PBB SHADOW E&M-EST. PATIENT-LVL IV: CPT | Mod: PBBFAC,,, | Performed by: NURSE PRACTITIONER

## 2023-01-23 PROCEDURE — 3077F PR MOST RECENT SYSTOLIC BLOOD PRESSURE >= 140 MM HG: ICD-10-PCS | Mod: CPTII,S$GLB,, | Performed by: NURSE PRACTITIONER

## 2023-01-23 PROCEDURE — 99999 PR PBB SHADOW E&M-EST. PATIENT-LVL IV: ICD-10-PCS | Mod: PBBFAC,,, | Performed by: NURSE PRACTITIONER

## 2023-01-23 PROCEDURE — 1159F MED LIST DOCD IN RCRD: CPT | Mod: CPTII,S$GLB,, | Performed by: NURSE PRACTITIONER

## 2023-01-23 PROCEDURE — 3008F PR BODY MASS INDEX (BMI) DOCUMENTED: ICD-10-PCS | Mod: CPTII,S$GLB,, | Performed by: NURSE PRACTITIONER

## 2023-01-23 PROCEDURE — 99204 PR OFFICE/OUTPT VISIT, NEW, LEVL IV, 45-59 MIN: ICD-10-PCS | Mod: S$GLB,,, | Performed by: NURSE PRACTITIONER

## 2023-01-23 PROCEDURE — 3080F PR MOST RECENT DIASTOLIC BLOOD PRESSURE >= 90 MM HG: ICD-10-PCS | Mod: CPTII,S$GLB,, | Performed by: NURSE PRACTITIONER

## 2023-01-23 PROCEDURE — 3080F DIAST BP >= 90 MM HG: CPT | Mod: CPTII,S$GLB,, | Performed by: NURSE PRACTITIONER

## 2023-01-23 RX ORDER — ESTRADIOL 0.1 MG/G
1 CREAM VAGINAL
Qty: 8 G | Refills: 11 | Status: SHIPPED | OUTPATIENT
Start: 2023-01-23 | End: 2023-11-28

## 2023-01-23 NOTE — PROGRESS NOTES
Chief Complaint  Urethral burning    HPI:   Patient is a 52-year-old female that is presenting with burning after each void for the past 3 months.  Patient is postmenopausal.  Denies nocturia or daytime frequency.  Urine in clinic is negative and PVR is 20 mL.  Patient states that symptoms have increased since menopause. No abd/pelvic pain and no exac/rel factors.  No hematuria.  No urolithiasis.  No urinary bother.  No  history.      Allergies:  Adhesive and Neosporin [neomycin-bacitracin-polymyxin]    Medications:  has a current medication list which includes the following prescription(s): albuterol, cetirizine, fexofenadine, fluticasone propionate, multivitamin-ca-iron-minerals, spironolactone, estradiol, and tretinoin.    Review of Systems:  General: No fever, chills, fatigability, or weight loss.  Skin: No rashes, itching, or changes in color or texture of skin.  Chest: Denies BLANCO, cyanosis, wheezing, cough, and sputum production.  Abdomen: Appetite fine. No weight loss. Denies diarrhea, abdominal pain, hematemesis, or blood in stool.  Musculoskeletal: No joint stiffness or swelling. Denies back pain.  : As above.  All other review of systems negative.    PMH:   has a past medical history of Acne, Anemia, Herpes simplex type II infection, History of COVID-19 (12/23/2020) (3/25/2021), History of partial thyroidectomy (3/25/2021), Mild intermittent asthma without complication, Multinodular goiter, Multiple thyroid nodules (3/25/2021), and Seasonal allergic rhinitis due to pollen.    PSH:   has a past surgical history that includes FNA thyroid nodule (04/2014); Thyroidectomy, partial (02/2015); Hernia repair; and uterine ablation (12/27/2017).    FamHx: family history includes Allergic rhinitis in her sister; Arthritis in her maternal grandmother; Asthma in her sister; Cancer in an other family member; Diabetes in her maternal grandfather; Hypertension in her maternal grandmother and mother; Thyroid disease in  her mother.    SocHx:  reports that she has never smoked. She has never used smokeless tobacco. She reports that she does not currently use alcohol. She reports that she does not use drugs.      Physical Exam:  Vitals:    01/23/23 1344   BP: (!) 144/93   Pulse: 79   Resp: 18   Temp: 97.2 °F (36.2 °C)     General: A&Ox3, no apparent distress, no deformities  Neck: No masses, normal thyroid  Lungs: normal inspiration, no use of accessory muscles  Heart: normal pulse, no arrhythmias  Abdomen: Soft, NT, ND, no masses, no hernias, no hepatosplenomegaly    Labs/Studies:   See HPI    Impression/Plan:   Burning with each void  Patient was prescribed Estrace cream to be used twice weekly, pea size amount to be placed on urethral meatus.  Patient to return to clinic for re-evaluation in 4 weeks.

## 2023-01-24 PROBLEM — G47.09 OTHER INSOMNIA: Status: RESOLVED | Noted: 2021-03-25 | Resolved: 2023-01-24

## 2023-01-24 PROBLEM — Z86.16 HISTORY OF COVID-19: Chronic | Status: RESOLVED | Noted: 2021-03-25 | Resolved: 2023-01-24

## 2023-01-24 PROBLEM — K90.49 FOOD INTOLERANCE: Status: RESOLVED | Noted: 2022-03-25 | Resolved: 2023-01-24

## 2023-01-24 PROBLEM — E66.3 OVERWEIGHT: Status: RESOLVED | Noted: 2018-11-15 | Resolved: 2023-01-24

## 2023-02-04 NOTE — PROGRESS NOTES
Liana Mcfadden.    I am happy to report that your recent breast imaging did NOT show evidence of cancer.    An annual mammogram is the best test to screen for breast cancer, but it is not perfect, and it can miss some cancers. So, even though your mammogram was normal, if you notice any lump or change in one of your breasts, please schedule an appointment with me for a proper evaluation.    Thanks for letting me care for you, and thanks for trusting [facility] with your healthcare needs.    Sincerely,    LATISHA Simmons MD

## 2023-02-20 ENCOUNTER — OFFICE VISIT (OUTPATIENT)
Dept: UROLOGY | Facility: CLINIC | Age: 53
End: 2023-02-20
Payer: COMMERCIAL

## 2023-02-20 VITALS
RESPIRATION RATE: 18 BRPM | HEIGHT: 64 IN | WEIGHT: 161.94 LBS | BODY MASS INDEX: 27.64 KG/M2 | HEART RATE: 78 BPM | SYSTOLIC BLOOD PRESSURE: 124 MMHG | DIASTOLIC BLOOD PRESSURE: 78 MMHG | TEMPERATURE: 97 F

## 2023-02-20 DIAGNOSIS — R30.0 BURNING WITH URINATION: Primary | ICD-10-CM

## 2023-02-20 PROBLEM — N92.0 MENORRHAGIA WITH REGULAR CYCLE: Status: RESOLVED | Noted: 2017-12-27 | Resolved: 2023-02-20

## 2023-02-20 PROCEDURE — 99999 PR PBB SHADOW E&M-EST. PATIENT-LVL III: ICD-10-PCS | Mod: PBBFAC,,, | Performed by: NURSE PRACTITIONER

## 2023-02-20 PROCEDURE — 1159F PR MEDICATION LIST DOCUMENTED IN MEDICAL RECORD: ICD-10-PCS | Mod: CPTII,S$GLB,, | Performed by: NURSE PRACTITIONER

## 2023-02-20 PROCEDURE — 99999 PR PBB SHADOW E&M-EST. PATIENT-LVL III: CPT | Mod: PBBFAC,,, | Performed by: NURSE PRACTITIONER

## 2023-02-20 PROCEDURE — 51798 POCT BLADDER SCAN: ICD-10-PCS | Mod: S$GLB,,, | Performed by: NURSE PRACTITIONER

## 2023-02-20 PROCEDURE — 81002 URINALYSIS NONAUTO W/O SCOPE: CPT | Mod: S$GLB,,, | Performed by: NURSE PRACTITIONER

## 2023-02-20 PROCEDURE — 3008F BODY MASS INDEX DOCD: CPT | Mod: CPTII,S$GLB,, | Performed by: NURSE PRACTITIONER

## 2023-02-20 PROCEDURE — 3074F SYST BP LT 130 MM HG: CPT | Mod: CPTII,S$GLB,, | Performed by: NURSE PRACTITIONER

## 2023-02-20 PROCEDURE — 3078F DIAST BP <80 MM HG: CPT | Mod: CPTII,S$GLB,, | Performed by: NURSE PRACTITIONER

## 2023-02-20 PROCEDURE — 1160F RVW MEDS BY RX/DR IN RCRD: CPT | Mod: CPTII,S$GLB,, | Performed by: NURSE PRACTITIONER

## 2023-02-20 PROCEDURE — 99214 OFFICE O/P EST MOD 30 MIN: CPT | Mod: S$GLB,,, | Performed by: NURSE PRACTITIONER

## 2023-02-20 PROCEDURE — 81002 POCT URINE DIPSTICK WITHOUT MICROSCOPE: ICD-10-PCS | Mod: S$GLB,,, | Performed by: NURSE PRACTITIONER

## 2023-02-20 PROCEDURE — 3008F PR BODY MASS INDEX (BMI) DOCUMENTED: ICD-10-PCS | Mod: CPTII,S$GLB,, | Performed by: NURSE PRACTITIONER

## 2023-02-20 PROCEDURE — 99214 PR OFFICE/OUTPT VISIT, EST, LEVL IV, 30-39 MIN: ICD-10-PCS | Mod: S$GLB,,, | Performed by: NURSE PRACTITIONER

## 2023-02-20 PROCEDURE — 3078F PR MOST RECENT DIASTOLIC BLOOD PRESSURE < 80 MM HG: ICD-10-PCS | Mod: CPTII,S$GLB,, | Performed by: NURSE PRACTITIONER

## 2023-02-20 PROCEDURE — 51798 US URINE CAPACITY MEASURE: CPT | Mod: S$GLB,,, | Performed by: NURSE PRACTITIONER

## 2023-02-20 PROCEDURE — 1160F PR REVIEW ALL MEDS BY PRESCRIBER/CLIN PHARMACIST DOCUMENTED: ICD-10-PCS | Mod: CPTII,S$GLB,, | Performed by: NURSE PRACTITIONER

## 2023-02-20 PROCEDURE — 3074F PR MOST RECENT SYSTOLIC BLOOD PRESSURE < 130 MM HG: ICD-10-PCS | Mod: CPTII,S$GLB,, | Performed by: NURSE PRACTITIONER

## 2023-02-20 PROCEDURE — 1159F MED LIST DOCD IN RCRD: CPT | Mod: CPTII,S$GLB,, | Performed by: NURSE PRACTITIONER

## 2023-02-20 NOTE — PROGRESS NOTES
Chief Complaint:   Vaginal burning with voiding    HPI:   The patient is a 52-year-old female that is presenting as a follow-up to burning with each void for the past 3 months.  Patient denies gross hematuria, dysuria and has no positive urine cultures in system.  Patient states the vaginal tissue is irritated and burns after voiding.  Was prescribed Estrace cream and reports a 60% decrease in symptoms.  Urine in clinic indicates blood, all other parameters negative.  PVR is 21 mL.  01/23/2023  Patient is a 52-year-old female that is presenting with burning after each void for the past 3 months.  Patient is postmenopausal.  Denies nocturia or daytime frequency.  Urine in clinic is negative and PVR is 20 mL.  Patient states that symptoms have increased since menopause. No abd/pelvic pain and no exac/rel factors.  No hematuria.  No urolithiasis.  No urinary bother.  No  history.       Allergies:  Adhesive and Neosporin [neomycin-bacitracin-polymyxin]    Medications:  has a current medication list which includes the following prescription(s): albuterol, cetirizine, estradiol, fexofenadine, fluticasone propionate, multivitamin-ca-iron-minerals, spironolactone, and tretinoin.    Review of Systems:  General: No fever, chills, fatigability, or weight loss.  Skin: No rashes, itching, or changes in color or texture of skin.  Chest: Denies BLANCO, cyanosis, wheezing, cough, and sputum production.  Abdomen: Appetite fine. No weight loss. Denies diarrhea, abdominal pain, hematemesis, or blood in stool.  Musculoskeletal: No joint stiffness or swelling. Denies back pain.  : As above.  All other review of systems negative.    PMH:   has a past medical history of Acne, Anemia, Herpes simplex type II infection, History of COVID-19 (12/23/2020) (3/25/2021), History of partial thyroidectomy (3/25/2021), Mild intermittent asthma without complication, Multinodular goiter, Multiple thyroid nodules (3/25/2021), and Seasonal allergic  rhinitis due to pollen.    PSH:   has a past surgical history that includes FNA thyroid nodule (04/2014); Thyroidectomy, partial (02/2015); Hernia repair; and uterine ablation (12/27/2017).    FamHx: family history includes Allergic rhinitis in her sister; Arthritis in her maternal grandmother; Asthma in her sister; Cancer in an other family member; Diabetes in her maternal grandfather; Hypertension in her maternal grandmother and mother; Thyroid disease in her mother.    SocHx:  reports that she has never smoked. She has never used smokeless tobacco. She reports that she does not currently use alcohol. She reports that she does not use drugs.      Physical Exam:  Vitals:    02/20/23 1527   BP: 124/78   Pulse: 78   Resp: 18   Temp: 97.2 °F (36.2 °C)     General: A&Ox3, no apparent distress, no deformities  Neck: No masses, normal thyroid  Lungs: normal inspiration, no use of accessory muscles  Heart: normal pulse, no arrhythmias  Abdomen: Soft, NT, ND, no masses, no hernias, no hepatosplenomegaly  Lymphatic: Neck and groin nodes negative    Labs/Studies:   See HPI    Impression/Plan:   Patient's symptoms coincide with vaginal atrophy.  Patient will remain on Estrace cream and follow up with gyn physician.  Urine sent for urinalysis and culture.

## 2023-02-21 DIAGNOSIS — R31.29 MICROHEMATURIA: Primary | ICD-10-CM

## 2023-02-21 PROBLEM — R30.0 DYSURIA: Status: RESOLVED | Noted: 2022-11-08 | Resolved: 2023-02-21

## 2023-02-21 LAB
BILIRUB SERPL-MCNC: NEGATIVE MG/DL
BLOOD URINE, POC: 50
CLARITY, POC UA: CLEAR
COLOR, POC UA: YELLOW
GLUCOSE UR QL STRIP: NEGATIVE
KETONES UR QL STRIP: NEGATIVE
LEUKOCYTE ESTERASE URINE, POC: NEGATIVE
NITRITE, POC UA: NEGATIVE
PH, POC UA: 7
POC RESIDUAL URINE VOLUME: 21 ML (ref 0–100)
PROTEIN, POC: NEGATIVE
SPECIFIC GRAVITY, POC UA: 1.01
UROBILINOGEN, POC UA: NEGATIVE

## 2023-02-24 ENCOUNTER — LAB VISIT (OUTPATIENT)
Dept: LAB | Facility: HOSPITAL | Age: 53
End: 2023-02-24
Attending: FAMILY MEDICINE
Payer: COMMERCIAL

## 2023-02-24 DIAGNOSIS — R31.29 MICROHEMATURIA: ICD-10-CM

## 2023-02-24 LAB
BILIRUB UR QL STRIP: NEGATIVE
CLARITY UR REFRACT.AUTO: CLEAR
COLOR UR AUTO: YELLOW
GLUCOSE UR QL STRIP: NEGATIVE
HGB UR QL STRIP: NEGATIVE
KETONES UR QL STRIP: NEGATIVE
LEUKOCYTE ESTERASE UR QL STRIP: NEGATIVE
NITRITE UR QL STRIP: NEGATIVE
PH UR STRIP: 7 [PH] (ref 5–8)
PROT UR QL STRIP: NEGATIVE
SP GR UR STRIP: 1.01 (ref 1–1.03)
URN SPEC COLLECT METH UR: NORMAL

## 2023-02-24 PROCEDURE — 81003 URINALYSIS AUTO W/O SCOPE: CPT | Performed by: NURSE PRACTITIONER

## 2023-02-24 PROCEDURE — 87086 URINE CULTURE/COLONY COUNT: CPT | Performed by: NURSE PRACTITIONER

## 2023-02-25 LAB — BACTERIA UR CULT: NORMAL

## 2023-03-05 NOTE — PROGRESS NOTES
The patient location is: work  The chief complaint leading to consultation is: follow-up acne.     Visit type: audiovisual    Face to Face time with patient: 10mins  10 minutes of total time spent on the encounter, which includes face to face time and non-face to face time preparing to see the patient (eg, review of tests), Obtaining and/or reviewing separately obtained history, Documenting clinical information in the electronic or other health record, Independently interpreting results (not separately reported) and communicating results to the patient/family/caregiver, or Care coordination (not separately reported).         Each patient to whom he or she provides medical services by telemedicine is:  (1) informed of the relationship between the physician and patient and the respective role of any other health care provider with respect to management of the patient; and (2) notified that he or she may decline to receive medical services by telemedicine and may withdraw from such care at any time.    Notes: Patient presents for follow-up of acne, last seen on 9/22/21 where she was started on spironolactone 50mg 2 times daily and topical clindamycin swabs. Reports much improvement in symptoms. Does developed a few breakouts occasionally, but overall improved. Tolerating medication well with no reported side effects. No other concerning rash or skin lesions.     ROS: Otherwise negative    PE: Const/neuro - AAOx3, NAD          Skin -         A/P: 1) Acne vulgaris - Improvement in symptoms. Will add topical tretinoin 0.05% cream to spironolactone 50mg 2 times daily.    .

## 2023-03-07 ENCOUNTER — PATIENT MESSAGE (OUTPATIENT)
Dept: UROLOGY | Facility: CLINIC | Age: 53
End: 2023-03-07
Payer: COMMERCIAL

## 2023-03-11 ENCOUNTER — DOCUMENTATION ONLY (OUTPATIENT)
Dept: INTERNAL MEDICINE | Facility: CLINIC | Age: 53
End: 2023-03-11

## 2023-03-16 ENCOUNTER — TELEPHONE (OUTPATIENT)
Dept: INTERNAL MEDICINE | Facility: CLINIC | Age: 53
End: 2023-03-16
Payer: COMMERCIAL

## 2023-03-16 NOTE — TELEPHONE ENCOUNTER
Informed pt of PROVIDER change for genevieve on 3/31/2023 at 10:00 am / Pt will see MALIHA Brown.

## 2023-03-31 ENCOUNTER — LAB VISIT (OUTPATIENT)
Dept: LAB | Facility: HOSPITAL | Age: 53
End: 2023-03-31
Attending: PHYSICIAN ASSISTANT
Payer: COMMERCIAL

## 2023-03-31 ENCOUNTER — IMMUNIZATION (OUTPATIENT)
Dept: PHARMACY | Facility: CLINIC | Age: 53
End: 2023-03-31
Payer: COMMERCIAL

## 2023-03-31 ENCOUNTER — OFFICE VISIT (OUTPATIENT)
Dept: INTERNAL MEDICINE | Facility: CLINIC | Age: 53
End: 2023-03-31
Payer: COMMERCIAL

## 2023-03-31 VITALS
BODY MASS INDEX: 26.42 KG/M2 | TEMPERATURE: 97 F | SYSTOLIC BLOOD PRESSURE: 124 MMHG | DIASTOLIC BLOOD PRESSURE: 82 MMHG | HEIGHT: 64 IN | WEIGHT: 154.75 LBS | OXYGEN SATURATION: 99 % | HEART RATE: 91 BPM

## 2023-03-31 DIAGNOSIS — L70.0 ACNE VULGARIS: ICD-10-CM

## 2023-03-31 DIAGNOSIS — F41.1 GENERALIZED ANXIETY DISORDER: ICD-10-CM

## 2023-03-31 DIAGNOSIS — Z00.00 ROUTINE HEALTH MAINTENANCE: Primary | ICD-10-CM

## 2023-03-31 DIAGNOSIS — Z23 NEED FOR VACCINATION: Primary | ICD-10-CM

## 2023-03-31 DIAGNOSIS — R05.9 COUGH, UNSPECIFIED TYPE: ICD-10-CM

## 2023-03-31 DIAGNOSIS — E04.2 MULTIPLE THYROID NODULES: Chronic | ICD-10-CM

## 2023-03-31 DIAGNOSIS — Z00.00 ROUTINE HEALTH MAINTENANCE: ICD-10-CM

## 2023-03-31 LAB
ALBUMIN SERPL BCP-MCNC: 4.1 G/DL (ref 3.5–5.2)
ALP SERPL-CCNC: 52 U/L (ref 55–135)
ALT SERPL W/O P-5'-P-CCNC: 25 U/L (ref 10–44)
ANION GAP SERPL CALC-SCNC: 9 MMOL/L (ref 8–16)
AST SERPL-CCNC: 23 U/L (ref 10–40)
BASOPHILS # BLD AUTO: 0.02 K/UL (ref 0–0.2)
BASOPHILS NFR BLD: 0.5 % (ref 0–1.9)
BILIRUB SERPL-MCNC: 0.5 MG/DL (ref 0.1–1)
BUN SERPL-MCNC: 9 MG/DL (ref 6–20)
CALCIUM SERPL-MCNC: 10.2 MG/DL (ref 8.7–10.5)
CHLORIDE SERPL-SCNC: 104 MMOL/L (ref 95–110)
CHOLEST SERPL-MCNC: 239 MG/DL (ref 120–199)
CHOLEST/HDLC SERPL: 3 {RATIO} (ref 2–5)
CO2 SERPL-SCNC: 27 MMOL/L (ref 23–29)
CREAT SERPL-MCNC: 1 MG/DL (ref 0.5–1.4)
DIFFERENTIAL METHOD: NORMAL
EOSINOPHIL # BLD AUTO: 0 K/UL (ref 0–0.5)
EOSINOPHIL NFR BLD: 0.7 % (ref 0–8)
ERYTHROCYTE [DISTWIDTH] IN BLOOD BY AUTOMATED COUNT: 12.2 % (ref 11.5–14.5)
EST. GFR  (NO RACE VARIABLE): >60 ML/MIN/1.73 M^2
GLUCOSE SERPL-MCNC: 87 MG/DL (ref 70–110)
HCT VFR BLD AUTO: 39.9 % (ref 37–48.5)
HDLC SERPL-MCNC: 81 MG/DL (ref 40–75)
HDLC SERPL: 33.9 % (ref 20–50)
HGB BLD-MCNC: 12.9 G/DL (ref 12–16)
IMM GRANULOCYTES # BLD AUTO: 0.01 K/UL (ref 0–0.04)
IMM GRANULOCYTES NFR BLD AUTO: 0.2 % (ref 0–0.5)
LDLC SERPL CALC-MCNC: 144.4 MG/DL (ref 63–159)
LYMPHOCYTES # BLD AUTO: 1.5 K/UL (ref 1–4.8)
LYMPHOCYTES NFR BLD: 33.5 % (ref 18–48)
MCH RBC QN AUTO: 30.1 PG (ref 27–31)
MCHC RBC AUTO-ENTMCNC: 32.3 G/DL (ref 32–36)
MCV RBC AUTO: 93 FL (ref 82–98)
MONOCYTES # BLD AUTO: 0.3 K/UL (ref 0.3–1)
MONOCYTES NFR BLD: 6.4 % (ref 4–15)
NEUTROPHILS # BLD AUTO: 2.6 K/UL (ref 1.8–7.7)
NEUTROPHILS NFR BLD: 58.7 % (ref 38–73)
NONHDLC SERPL-MCNC: 158 MG/DL
NRBC BLD-RTO: 0 /100 WBC
PLATELET # BLD AUTO: 360 K/UL (ref 150–450)
PMV BLD AUTO: 10.4 FL (ref 9.2–12.9)
POTASSIUM SERPL-SCNC: 4.1 MMOL/L (ref 3.5–5.1)
PROT SERPL-MCNC: 8 G/DL (ref 6–8.4)
RBC # BLD AUTO: 4.29 M/UL (ref 4–5.4)
SODIUM SERPL-SCNC: 140 MMOL/L (ref 136–145)
TRIGL SERPL-MCNC: 68 MG/DL (ref 30–150)
TSH SERPL DL<=0.005 MIU/L-ACNC: 0.66 UIU/ML (ref 0.4–4)
WBC # BLD AUTO: 4.36 K/UL (ref 3.9–12.7)

## 2023-03-31 PROCEDURE — 1159F MED LIST DOCD IN RCRD: CPT | Mod: CPTII,S$GLB,, | Performed by: PHYSICIAN ASSISTANT

## 2023-03-31 PROCEDURE — 99214 OFFICE O/P EST MOD 30 MIN: CPT | Mod: S$GLB,,, | Performed by: PHYSICIAN ASSISTANT

## 2023-03-31 PROCEDURE — 1160F PR REVIEW ALL MEDS BY PRESCRIBER/CLIN PHARMACIST DOCUMENTED: ICD-10-PCS | Mod: CPTII,S$GLB,, | Performed by: PHYSICIAN ASSISTANT

## 2023-03-31 PROCEDURE — 99999 PR PBB SHADOW E&M-EST. PATIENT-LVL V: ICD-10-PCS | Mod: PBBFAC,,, | Performed by: PHYSICIAN ASSISTANT

## 2023-03-31 PROCEDURE — 3079F PR MOST RECENT DIASTOLIC BLOOD PRESSURE 80-89 MM HG: ICD-10-PCS | Mod: CPTII,S$GLB,, | Performed by: PHYSICIAN ASSISTANT

## 2023-03-31 PROCEDURE — 1160F RVW MEDS BY RX/DR IN RCRD: CPT | Mod: CPTII,S$GLB,, | Performed by: PHYSICIAN ASSISTANT

## 2023-03-31 PROCEDURE — 85025 COMPLETE CBC W/AUTO DIFF WBC: CPT | Performed by: PHYSICIAN ASSISTANT

## 2023-03-31 PROCEDURE — 99999 PR PBB SHADOW E&M-EST. PATIENT-LVL V: CPT | Mod: PBBFAC,,, | Performed by: PHYSICIAN ASSISTANT

## 2023-03-31 PROCEDURE — 80061 LIPID PANEL: CPT | Performed by: PHYSICIAN ASSISTANT

## 2023-03-31 PROCEDURE — 80053 COMPREHEN METABOLIC PANEL: CPT | Performed by: PHYSICIAN ASSISTANT

## 2023-03-31 PROCEDURE — 3008F BODY MASS INDEX DOCD: CPT | Mod: CPTII,S$GLB,, | Performed by: PHYSICIAN ASSISTANT

## 2023-03-31 PROCEDURE — 3074F PR MOST RECENT SYSTOLIC BLOOD PRESSURE < 130 MM HG: ICD-10-PCS | Mod: CPTII,S$GLB,, | Performed by: PHYSICIAN ASSISTANT

## 2023-03-31 PROCEDURE — 36415 COLL VENOUS BLD VENIPUNCTURE: CPT | Performed by: PHYSICIAN ASSISTANT

## 2023-03-31 PROCEDURE — 3008F PR BODY MASS INDEX (BMI) DOCUMENTED: ICD-10-PCS | Mod: CPTII,S$GLB,, | Performed by: PHYSICIAN ASSISTANT

## 2023-03-31 PROCEDURE — 3079F DIAST BP 80-89 MM HG: CPT | Mod: CPTII,S$GLB,, | Performed by: PHYSICIAN ASSISTANT

## 2023-03-31 PROCEDURE — 1159F PR MEDICATION LIST DOCUMENTED IN MEDICAL RECORD: ICD-10-PCS | Mod: CPTII,S$GLB,, | Performed by: PHYSICIAN ASSISTANT

## 2023-03-31 PROCEDURE — 84443 ASSAY THYROID STIM HORMONE: CPT | Performed by: PHYSICIAN ASSISTANT

## 2023-03-31 PROCEDURE — 3074F SYST BP LT 130 MM HG: CPT | Mod: CPTII,S$GLB,, | Performed by: PHYSICIAN ASSISTANT

## 2023-03-31 PROCEDURE — 99214 PR OFFICE/OUTPT VISIT, EST, LEVL IV, 30-39 MIN: ICD-10-PCS | Mod: S$GLB,,, | Performed by: PHYSICIAN ASSISTANT

## 2023-03-31 RX ORDER — PROMETHAZINE HYDROCHLORIDE AND DEXTROMETHORPHAN HYDROBROMIDE 6.25; 15 MG/5ML; MG/5ML
5 SYRUP ORAL EVERY 4 HOURS PRN
Qty: 118 ML | Refills: 0 | Status: SHIPPED | OUTPATIENT
Start: 2023-03-31 | End: 2023-04-05

## 2023-03-31 RX ORDER — SERTRALINE HYDROCHLORIDE 50 MG/1
50 TABLET, FILM COATED ORAL DAILY
Qty: 30 TABLET | Refills: 1 | Status: SHIPPED | OUTPATIENT
Start: 2023-03-31 | End: 2023-05-26 | Stop reason: SINTOL

## 2023-03-31 RX ORDER — BENZONATATE 200 MG/1
200 CAPSULE ORAL 3 TIMES DAILY PRN
Qty: 30 CAPSULE | Refills: 0 | Status: SHIPPED | OUTPATIENT
Start: 2023-03-31 | End: 2023-04-10

## 2023-03-31 RX ORDER — LEVOCETIRIZINE DIHYDROCHLORIDE 5 MG/1
5 TABLET, FILM COATED ORAL
COMMUNITY
Start: 2022-12-20 | End: 2023-03-31

## 2023-03-31 NOTE — PROGRESS NOTES
Subjective:      Patient ID: Liana Keane is a 52 y.o. female.    Chief Complaint: Annual Exam      Here today for her annual exam. Also would like to discuss an ongoing cough.   Cough  This is a new problem. The current episode started in the past 7 days. The problem has been gradually worsening. The cough is Non-productive. Associated symptoms include postnasal drip, rhinorrhea and wheezing. Pertinent negatives include no chest pain, chills, ear congestion, ear pain, fever, headaches, heartburn, hemoptysis, myalgias, nasal congestion, rash, sore throat, shortness of breath, sweats or weight loss. Treatments tried: allegra and flonase. Her past medical history is significant for environmental allergies. There is no history of asthma, bronchiectasis, bronchitis, COPD, emphysema or pneumonia.     Mood: good but does have anxiety. Recent job change. Overwhelmed because also in school. Increased anxiety and irritability. Needs to step away from situations.   No depression.   Currently in counseling. Has been in counseling for years.     Patient Active Problem List   Diagnosis    Mild intermittent asthma without complication    Herpes simplex type II infection    History of partial thyroidectomy    Multiple thyroid nodules    Benign paroxysmal positional vertigo         Current Outpatient Medications:     albuterol (PROAIR HFA) 90 mcg/actuation inhaler, Inhale 2 puffs into the lungs every 6 (six) hours as needed for Wheezing or Shortness of Breath (or Cough)., Disp: 8.5 g, Rfl: 5    cetirizine (ZYRTEC) 10 MG tablet, Take 10 mg by mouth once daily., Disp: , Rfl:     estradioL (ESTRACE) 0.01 % (0.1 mg/gram) vaginal cream, Place 1 g vaginally twice a week., Disp: 8 g, Rfl: 11    fexofenadine (ALLEGRA) 180 MG tablet, , Disp: , Rfl:     fluticasone propionate (FLONASE) 50 mcg/actuation nasal spray, 2 sprays (100 mcg total) by Each Nostril route once daily., Disp: 16 g, Rfl: 11    multivitamin-Ca-iron-minerals Tab,  Take by mouth once daily., Disp: , Rfl:     benzonatate (TESSALON) 200 MG capsule, Take 1 capsule (200 mg total) by mouth 3 (three) times daily as needed for Cough., Disp: 30 capsule, Rfl: 0    promethazine-dextromethorphan (PROMETHAZINE-DM) 6.25-15 mg/5 mL Syrp, Take 5 mLs by mouth every 4 (four) hours as needed (cough)., Disp: 118 mL, Rfl: 0    sertraline (ZOLOFT) 50 MG tablet, Take 1 tablet (50 mg total) by mouth once daily., Disp: 30 tablet, Rfl: 1    tretinoin (RETIN-A) 0.05 % cream, Apply topically every evening. (Patient not taking: Reported on 11/8/2022), Disp: 45 g, Rfl: 3    Review of Systems   Constitutional:  Negative for activity change, appetite change, chills, diaphoresis, fatigue, fever, unexpected weight change and weight loss.   HENT:  Positive for postnasal drip and rhinorrhea. Negative for congestion, ear pain, hearing loss, sore throat, trouble swallowing and voice change.    Eyes: Negative.  Negative for visual disturbance.   Respiratory:  Positive for cough and wheezing. Negative for hemoptysis, choking, chest tightness and shortness of breath.    Cardiovascular:  Negative for chest pain, palpitations and leg swelling.   Gastrointestinal:  Negative for abdominal distention, abdominal pain, blood in stool, constipation, diarrhea, heartburn, nausea and vomiting.   Endocrine: Negative for cold intolerance, heat intolerance, polydipsia and polyuria.   Genitourinary: Negative.  Negative for difficulty urinating and frequency.   Musculoskeletal:  Negative for arthralgias, back pain, gait problem, joint swelling and myalgias.   Skin:  Negative for color change, pallor, rash and wound.   Allergic/Immunologic: Positive for environmental allergies.   Neurological:  Negative for dizziness, tremors, weakness, light-headedness, numbness and headaches.   Hematological:  Negative for adenopathy.   Psychiatric/Behavioral:  Positive for agitation. Negative for behavioral problems, confusion, self-injury, sleep  "disturbance and suicidal ideas. The patient is nervous/anxious.    Objective:   /82 (BP Location: Left arm, Patient Position: Sitting, BP Method: Small (Manual))   Pulse 91   Temp 97.1 °F (36.2 °C) (Tympanic)   Ht 5' 4" (1.626 m)   Wt 70.2 kg (154 lb 12.2 oz)   SpO2 99%   BMI 26.57 kg/m²     Physical Exam  Vitals reviewed.   Constitutional:       General: She is not in acute distress.     Appearance: Normal appearance. She is well-developed. She is not ill-appearing, toxic-appearing or diaphoretic.   HENT:      Head: Normocephalic and atraumatic.      Right Ear: External ear normal.      Left Ear: External ear normal.      Nose: Nose normal.   Eyes:      Conjunctiva/sclera: Conjunctivae normal.      Pupils: Pupils are equal, round, and reactive to light.   Cardiovascular:      Rate and Rhythm: Normal rate and regular rhythm.      Heart sounds: Normal heart sounds. No murmur heard.    No friction rub. No gallop.   Pulmonary:      Effort: Pulmonary effort is normal. No respiratory distress.      Breath sounds: Normal breath sounds. No wheezing or rales.   Chest:      Chest wall: No tenderness.   Abdominal:      General: There is no distension.      Palpations: Abdomen is soft.      Tenderness: There is no abdominal tenderness.   Musculoskeletal:         General: Normal range of motion.      Cervical back: Normal range of motion and neck supple.   Lymphadenopathy:      Cervical: No cervical adenopathy.   Skin:     General: Skin is warm and dry.      Capillary Refill: Capillary refill takes less than 2 seconds.      Findings: No rash.   Neurological:      Mental Status: She is alert and oriented to person, place, and time.      Motor: No weakness.      Coordination: Coordination normal.      Gait: Gait normal.   Psychiatric:         Attention and Perception: Attention and perception normal.         Mood and Affect: Mood normal.         Behavior: Behavior normal.         Thought Content: Thought content " normal. Thought content is not paranoid or delusional. Thought content does not include homicidal or suicidal ideation. Thought content does not include homicidal or suicidal plan.         Cognition and Memory: Cognition and memory normal.         Judgment: Judgment normal.       Assessment:     1. Routine health maintenance    2. Cough, unspecified type    3. Generalized anxiety disorder    4. Acne vulgaris    5. Multiple thyroid nodules      Plan:   Routine health maintenance  -     CBC Auto Differential; Future; Expected date: 03/31/2023  -     Comprehensive Metabolic Panel; Future  -     Lipid Panel; Future; Expected date: 03/31/2023  -     TSH; Future    Cough, unspecified type  -     benzonatate (TESSALON) 200 MG capsule; Take 1 capsule (200 mg total) by mouth 3 (three) times daily as needed for Cough.  Dispense: 30 capsule; Refill: 0  -     promethazine-dextromethorphan (PROMETHAZINE-DM) 6.25-15 mg/5 mL Syrp; Take 5 mLs by mouth every 4 (four) hours as needed (cough).  Dispense: 118 mL; Refill: 0    Generalized anxiety disorder  -     sertraline (ZOLOFT) 50 MG tablet; Take 1 tablet (50 mg total) by mouth once daily.  Dispense: 30 tablet; Refill: 1  -diagnosis and treatment options discussed. Agree to try sertraline. SE discussed. Virtual follow up in 1 month.     Acne vulgaris  -stable off spironolactone    Multiple thyroid nodules  -     US Soft Tissue Head Neck Thyroid; Future; Expected date: 03/31/2023  -recheck at end of year as pt requested.     -PCP follow up in 6 months.     Follow up in 4 weeks (on 4/28/2023), or if symptoms worsen or fail to improve.

## 2023-05-26 ENCOUNTER — OFFICE VISIT (OUTPATIENT)
Dept: INTERNAL MEDICINE | Facility: CLINIC | Age: 53
End: 2023-05-26
Payer: COMMERCIAL

## 2023-05-26 VITALS
OXYGEN SATURATION: 98 % | HEIGHT: 64 IN | SYSTOLIC BLOOD PRESSURE: 130 MMHG | WEIGHT: 153.25 LBS | HEART RATE: 85 BPM | TEMPERATURE: 98 F | BODY MASS INDEX: 26.16 KG/M2 | DIASTOLIC BLOOD PRESSURE: 84 MMHG

## 2023-05-26 DIAGNOSIS — F41.9 ANXIETY: ICD-10-CM

## 2023-05-26 DIAGNOSIS — R53.83 FATIGUE, UNSPECIFIED TYPE: Primary | ICD-10-CM

## 2023-05-26 PROCEDURE — 3079F PR MOST RECENT DIASTOLIC BLOOD PRESSURE 80-89 MM HG: ICD-10-PCS | Mod: CPTII,S$GLB,, | Performed by: PHYSICIAN ASSISTANT

## 2023-05-26 PROCEDURE — 1159F MED LIST DOCD IN RCRD: CPT | Mod: CPTII,S$GLB,, | Performed by: PHYSICIAN ASSISTANT

## 2023-05-26 PROCEDURE — 3075F SYST BP GE 130 - 139MM HG: CPT | Mod: CPTII,S$GLB,, | Performed by: PHYSICIAN ASSISTANT

## 2023-05-26 PROCEDURE — 99999 PR PBB SHADOW E&M-EST. PATIENT-LVL IV: CPT | Mod: PBBFAC,,, | Performed by: PHYSICIAN ASSISTANT

## 2023-05-26 PROCEDURE — 3075F PR MOST RECENT SYSTOLIC BLOOD PRESS GE 130-139MM HG: ICD-10-PCS | Mod: CPTII,S$GLB,, | Performed by: PHYSICIAN ASSISTANT

## 2023-05-26 PROCEDURE — 1159F PR MEDICATION LIST DOCUMENTED IN MEDICAL RECORD: ICD-10-PCS | Mod: CPTII,S$GLB,, | Performed by: PHYSICIAN ASSISTANT

## 2023-05-26 PROCEDURE — 99213 OFFICE O/P EST LOW 20 MIN: CPT | Mod: S$GLB,,, | Performed by: PHYSICIAN ASSISTANT

## 2023-05-26 PROCEDURE — 99999 PR PBB SHADOW E&M-EST. PATIENT-LVL IV: ICD-10-PCS | Mod: PBBFAC,,, | Performed by: PHYSICIAN ASSISTANT

## 2023-05-26 PROCEDURE — 3008F PR BODY MASS INDEX (BMI) DOCUMENTED: ICD-10-PCS | Mod: CPTII,S$GLB,, | Performed by: PHYSICIAN ASSISTANT

## 2023-05-26 PROCEDURE — 3008F BODY MASS INDEX DOCD: CPT | Mod: CPTII,S$GLB,, | Performed by: PHYSICIAN ASSISTANT

## 2023-05-26 PROCEDURE — 99213 PR OFFICE/OUTPT VISIT, EST, LEVL III, 20-29 MIN: ICD-10-PCS | Mod: S$GLB,,, | Performed by: PHYSICIAN ASSISTANT

## 2023-05-26 PROCEDURE — 1160F PR REVIEW ALL MEDS BY PRESCRIBER/CLIN PHARMACIST DOCUMENTED: ICD-10-PCS | Mod: CPTII,S$GLB,, | Performed by: PHYSICIAN ASSISTANT

## 2023-05-26 PROCEDURE — 3079F DIAST BP 80-89 MM HG: CPT | Mod: CPTII,S$GLB,, | Performed by: PHYSICIAN ASSISTANT

## 2023-05-26 PROCEDURE — 1160F RVW MEDS BY RX/DR IN RCRD: CPT | Mod: CPTII,S$GLB,, | Performed by: PHYSICIAN ASSISTANT

## 2023-05-26 RX ORDER — BUPROPION HYDROCHLORIDE 150 MG/1
150 TABLET ORAL DAILY
Qty: 30 TABLET | Refills: 1 | Status: SHIPPED | OUTPATIENT
Start: 2023-05-26 | End: 2023-11-28 | Stop reason: SINTOL

## 2023-05-26 NOTE — PROGRESS NOTES
Subjective:      Patient ID: Liana Keane is a 52 y.o. female.    Chief Complaint: Fatigue      Here today for evaluation of exhaustion/fatigue. Stares at a computer screen all day at work and then goes home and does the same thing to complete her homework. Occasionally she starts to get dizzy/light headed, gets a dull headache, and a feeling like she is going to pass out. Improves immediately when she gets up and walks around.   Only happens when she is staring at a computer for long periods of time.   Fatigue  Associated symptoms include fatigue. Pertinent negatives include no abdominal pain, arthralgias, chest pain, chills, congestion, coughing, diaphoresis, fever, headaches, joint swelling, myalgias, nausea, neck pain, numbness, rash, sore throat, vomiting or weakness.     Too much nausea with the zoloft so couldn't get leandro the first weeks of the medication.   Still very stressed with work.     Patient Active Problem List   Diagnosis    Mild intermittent asthma without complication    Herpes simplex type II infection    History of partial thyroidectomy    Multiple thyroid nodules    Benign paroxysmal positional vertigo         Current Outpatient Medications:     albuterol (PROAIR HFA) 90 mcg/actuation inhaler, Inhale 2 puffs into the lungs every 6 (six) hours as needed for Wheezing or Shortness of Breath (or Cough)., Disp: 8.5 g, Rfl: 5    cetirizine (ZYRTEC) 10 MG tablet, Take 10 mg by mouth once daily., Disp: , Rfl:     estradioL (ESTRACE) 0.01 % (0.1 mg/gram) vaginal cream, Place 1 g vaginally twice a week., Disp: 8 g, Rfl: 11    fexofenadine (ALLEGRA) 180 MG tablet, , Disp: , Rfl:     fluticasone propionate (FLONASE) 50 mcg/actuation nasal spray, 2 sprays (100 mcg total) by Each Nostril route once daily., Disp: 16 g, Rfl: 11    multivitamin-Ca-iron-minerals Tab, Take by mouth once daily., Disp: , Rfl:     buPROPion (WELLBUTRIN XL) 150 MG TB24 tablet, Take 1 tablet (150 mg total) by mouth once  "daily., Disp: 30 tablet, Rfl: 1    tretinoin (RETIN-A) 0.05 % cream, Apply topically every evening. (Patient not taking: Reported on 5/26/2023), Disp: 45 g, Rfl: 3    Review of Systems   Constitutional:  Positive for fatigue. Negative for activity change, appetite change, chills, diaphoresis, fever and unexpected weight change.   HENT: Negative.  Negative for congestion, hearing loss, postnasal drip, rhinorrhea, sore throat, trouble swallowing and voice change.    Eyes: Negative.  Negative for discharge and visual disturbance.   Respiratory: Negative.  Negative for cough, choking, chest tightness, shortness of breath and wheezing.    Cardiovascular:  Negative for chest pain, palpitations and leg swelling.   Gastrointestinal:  Negative for abdominal distention, abdominal pain, blood in stool, constipation, diarrhea, nausea and vomiting.   Endocrine: Negative for cold intolerance, heat intolerance, polydipsia and polyuria.   Genitourinary: Negative.  Negative for difficulty urinating, dysuria, frequency, hematuria and menstrual problem.   Musculoskeletal:  Negative for arthralgias, back pain, gait problem, joint swelling, myalgias and neck pain.   Skin:  Negative for color change, pallor, rash and wound.   Neurological:  Negative for dizziness, tremors, weakness, light-headedness, numbness and headaches.   Hematological:  Negative for adenopathy.   Psychiatric/Behavioral:  Negative for behavioral problems, confusion, dysphoric mood, self-injury, sleep disturbance and suicidal ideas. The patient is nervous/anxious.    Objective:   /84 (BP Location: Left arm, Patient Position: Sitting, BP Method: Medium (Manual))   Pulse 85   Temp 98.3 °F (36.8 °C) (Tympanic)   Ht 5' 4" (1.626 m)   Wt 69.5 kg (153 lb 3.5 oz)   SpO2 98%   BMI 26.30 kg/m²     Physical Exam  Vitals and nursing note reviewed.   Constitutional:       General: She is not in acute distress.     Appearance: Normal appearance. She is well-developed. " She is not ill-appearing, toxic-appearing or diaphoretic.   HENT:      Head: Normocephalic and atraumatic.   Cardiovascular:      Rate and Rhythm: Normal rate and regular rhythm.      Heart sounds: Normal heart sounds. No murmur heard.    No friction rub. No gallop.   Pulmonary:      Effort: Pulmonary effort is normal. No respiratory distress.      Breath sounds: Normal breath sounds. No wheezing or rales.   Musculoskeletal:         General: Normal range of motion.   Skin:     General: Skin is warm.      Capillary Refill: Capillary refill takes less than 2 seconds.      Findings: No rash.   Neurological:      Mental Status: She is alert and oriented to person, place, and time.      Motor: No weakness.      Gait: Gait normal.   Psychiatric:         Mood and Affect: Mood normal.         Behavior: Behavior normal.         Thought Content: Thought content normal.         Judgment: Judgment normal.     Lab Visit on 03/31/2023   Component Date Value Ref Range Status    WBC 03/31/2023 4.36  3.90 - 12.70 K/uL Final    RBC 03/31/2023 4.29  4.00 - 5.40 M/uL Final    Hemoglobin 03/31/2023 12.9  12.0 - 16.0 g/dL Final    Hematocrit 03/31/2023 39.9  37.0 - 48.5 % Final    MCV 03/31/2023 93  82 - 98 fL Final    MCH 03/31/2023 30.1  27.0 - 31.0 pg Final    MCHC 03/31/2023 32.3  32.0 - 36.0 g/dL Final    RDW 03/31/2023 12.2  11.5 - 14.5 % Final    Platelets 03/31/2023 360  150 - 450 K/uL Final    MPV 03/31/2023 10.4  9.2 - 12.9 fL Final    Immature Granulocytes 03/31/2023 0.2  0.0 - 0.5 % Final    Gran # (ANC) 03/31/2023 2.6  1.8 - 7.7 K/uL Final    Immature Grans (Abs) 03/31/2023 0.01  0.00 - 0.04 K/uL Final    Comment: Mild elevation in immature granulocytes is non specific and   can be seen in a variety of conditions including stress response,   acute inflammation, trauma and pregnancy. Correlation with other   laboratory and clinical findings is essential.      Lymph # 03/31/2023 1.5  1.0 - 4.8 K/ Final    Mono # 03/31/2023  0.3  0.3 - 1.0 K/uL Final    Eos # 03/31/2023 0.0  0.0 - 0.5 K/uL Final    Baso # 03/31/2023 0.02  0.00 - 0.20 K/uL Final    nRBC 03/31/2023 0  0 /100 WBC Final    Gran % 03/31/2023 58.7  38.0 - 73.0 % Final    Lymph % 03/31/2023 33.5  18.0 - 48.0 % Final    Mono % 03/31/2023 6.4  4.0 - 15.0 % Final    Eosinophil % 03/31/2023 0.7  0.0 - 8.0 % Final    Basophil % 03/31/2023 0.5  0.0 - 1.9 % Final    Differential Method 03/31/2023 Automated   Final    Sodium 03/31/2023 140  136 - 145 mmol/L Final    Potassium 03/31/2023 4.1  3.5 - 5.1 mmol/L Final    Chloride 03/31/2023 104  95 - 110 mmol/L Final    CO2 03/31/2023 27  23 - 29 mmol/L Final    Glucose 03/31/2023 87  70 - 110 mg/dL Final    BUN 03/31/2023 9  6 - 20 mg/dL Final    Creatinine 03/31/2023 1.0  0.5 - 1.4 mg/dL Final    Calcium 03/31/2023 10.2  8.7 - 10.5 mg/dL Final    Total Protein 03/31/2023 8.0  6.0 - 8.4 g/dL Final    Albumin 03/31/2023 4.1  3.5 - 5.2 g/dL Final    Total Bilirubin 03/31/2023 0.5  0.1 - 1.0 mg/dL Final    Comment: For infants and newborns, interpretation of results should be based  on gestational age, weight and in agreement with clinical  observations.    Premature Infant recommended reference ranges:  Up to 24 hours.............<8.0 mg/dL  Up to 48 hours............<12.0 mg/dL  3-5 days..................<15.0 mg/dL  6-29 days.................<15.0 mg/dL      Alkaline Phosphatase 03/31/2023 52 (L)  55 - 135 U/L Final    AST 03/31/2023 23  10 - 40 U/L Final    ALT 03/31/2023 25  10 - 44 U/L Final    Anion Gap 03/31/2023 9  8 - 16 mmol/L Final    eGFR 03/31/2023 >60.0  >60 mL/min/1.73 m^2 Final    Cholesterol 03/31/2023 239 (H)  120 - 199 mg/dL Final    Comment: The National Cholesterol Education Program (NCEP) has set the  following guidelines (reference ranges) for Cholesterol:  Optimal.....................<200 mg/dL  Borderline High.............200-239 mg/dL  High........................> or = 240 mg/dL      Triglycerides 03/31/2023 68   30 - 150 mg/dL Final    Comment: The National Cholesterol Education Program (NCEP) has set the  following guidelines (reference values) for triglycerides:  Normal......................<150 mg/dL  Borderline High.............150-199 mg/dL  High........................200-499 mg/dL      HDL 03/31/2023 81 (H)  40 - 75 mg/dL Final    Comment: The National Cholesterol Education Program (NCEP) has set the  following guidelines (reference values) for HDL Cholesterol:  Low...............<40 mg/dL  Optimal...........>60 mg/dL      LDL Cholesterol 03/31/2023 144.4  63.0 - 159.0 mg/dL Final    Comment: The National Cholesterol Education Program (NCEP) has set the  following guidelines (reference values) for LDL Cholesterol:  Optimal.......................<130 mg/dL  Borderline High...............130-159 mg/dL  High..........................160-189 mg/dL  Very High.....................>190 mg/dL      HDL/Cholesterol Ratio 03/31/2023 33.9  20.0 - 50.0 % Final    Total Cholesterol/HDL Ratio 03/31/2023 3.0  2.0 - 5.0 Final    Non-HDL Cholesterol 03/31/2023 158  mg/dL Final    Comment: Risk category and Non-HDL cholesterol goals:  Coronary heart disease (CHD)or equivalent (10-year risk of CHD >20%):  Non-HDL cholesterol goal     <130 mg/dL  Two or more CHD risk factors and 10-year risk of CHD <= 20%:  Non-HDL cholesterol goal     <160 mg/dL  0 to 1 CHD risk factor:  Non-HDL cholesterol goal     <190 mg/dL      TSH 03/31/2023 0.660  0.400 - 4.000 uIU/mL Final   Lab Visit on 02/24/2023   Component Date Value Ref Range Status    Urine Culture, Routine 02/24/2023 No significant growth   Final    Specimen UA 02/24/2023 Urine, Clean Catch   Final    Color, UA 02/24/2023 Yellow  Yellow, Straw, Vane Final    Appearance, UA 02/24/2023 Clear  Clear Final    pH, UA 02/24/2023 7.0  5.0 - 8.0 Final    Specific Gravity, UA 02/24/2023 1.015  1.005 - 1.030 Final    Protein, UA 02/24/2023 Negative  Negative Final    Comment: Recommend a 24 hour  urine protein or a urine   protein/creatinine ratio if globulin induced proteinuria is  clinically suspected.      Glucose, UA 02/24/2023 Negative  Negative Final    Ketones, UA 02/24/2023 Negative  Negative Final    Bilirubin (UA) 02/24/2023 Negative  Negative Final    Occult Blood UA 02/24/2023 Negative  Negative Final    Nitrite, UA 02/24/2023 Negative  Negative Final    Leukocytes, UA 02/24/2023 Negative  Negative Final   Office Visit on 02/20/2023   Component Date Value Ref Range Status    Color, UA 02/21/2023 Yellow   Final    pH, UA 02/21/2023 7   Final    WBC, UA 02/21/2023 negative   Final    Nitrite, UA 02/21/2023 negative   Final    Protein, POC 02/21/2023 negative   Final    Glucose, UA 02/21/2023 negative   Final    Ketones, UA 02/21/2023 negative   Final    Urobilinogen, UA 02/21/2023 negative   Final    Bilirubin, POC 02/21/2023 negative   Final    Blood, UA 02/21/2023 50   Final    Clarity, UA 02/21/2023 Clear   Final    Spec Grav UA 02/21/2023 1.015   Final    POC Residual Urine Volume 02/21/2023 21  0 - 100 mL Final       Assessment:     1. Fatigue, unspecified type    2. Anxiety      Plan:   Fatigue, unspecified type    Anxiety  -     buPROPion (WELLBUTRIN XL) 150 MG TB24 tablet; Take 1 tablet (150 mg total) by mouth once daily.  Dispense: 30 tablet; Refill: 1    -take breaks from the computer every 20 minutes. Handout on how to reduce eye strain printed out on her AVS.   -agree to try buproprion to help with stress    Follow up in about 4 weeks (around 6/23/2023), or if symptoms worsen or fail to improve, for Virtual Visit.

## 2023-06-27 ENCOUNTER — OFFICE VISIT (OUTPATIENT)
Dept: OBSTETRICS AND GYNECOLOGY | Facility: CLINIC | Age: 53
End: 2023-06-27
Payer: COMMERCIAL

## 2023-06-27 VITALS
SYSTOLIC BLOOD PRESSURE: 116 MMHG | BODY MASS INDEX: 26.31 KG/M2 | WEIGHT: 154.13 LBS | DIASTOLIC BLOOD PRESSURE: 74 MMHG | HEIGHT: 64 IN

## 2023-06-27 DIAGNOSIS — N95.1 HOT FLASHES DUE TO MENOPAUSE: Primary | ICD-10-CM

## 2023-06-27 PROCEDURE — 3074F PR MOST RECENT SYSTOLIC BLOOD PRESSURE < 130 MM HG: ICD-10-PCS | Mod: CPTII,S$GLB,, | Performed by: OBSTETRICS & GYNECOLOGY

## 2023-06-27 PROCEDURE — 1159F MED LIST DOCD IN RCRD: CPT | Mod: CPTII,S$GLB,, | Performed by: OBSTETRICS & GYNECOLOGY

## 2023-06-27 PROCEDURE — 99213 PR OFFICE/OUTPT VISIT, EST, LEVL III, 20-29 MIN: ICD-10-PCS | Mod: S$GLB,,, | Performed by: OBSTETRICS & GYNECOLOGY

## 2023-06-27 PROCEDURE — 3078F PR MOST RECENT DIASTOLIC BLOOD PRESSURE < 80 MM HG: ICD-10-PCS | Mod: CPTII,S$GLB,, | Performed by: OBSTETRICS & GYNECOLOGY

## 2023-06-27 PROCEDURE — 99999 PR PBB SHADOW E&M-EST. PATIENT-LVL IV: ICD-10-PCS | Mod: PBBFAC,,, | Performed by: OBSTETRICS & GYNECOLOGY

## 2023-06-27 PROCEDURE — 99999 PR PBB SHADOW E&M-EST. PATIENT-LVL IV: CPT | Mod: PBBFAC,,, | Performed by: OBSTETRICS & GYNECOLOGY

## 2023-06-27 PROCEDURE — 1160F PR REVIEW ALL MEDS BY PRESCRIBER/CLIN PHARMACIST DOCUMENTED: ICD-10-PCS | Mod: CPTII,S$GLB,, | Performed by: OBSTETRICS & GYNECOLOGY

## 2023-06-27 PROCEDURE — 3074F SYST BP LT 130 MM HG: CPT | Mod: CPTII,S$GLB,, | Performed by: OBSTETRICS & GYNECOLOGY

## 2023-06-27 PROCEDURE — 3008F PR BODY MASS INDEX (BMI) DOCUMENTED: ICD-10-PCS | Mod: CPTII,S$GLB,, | Performed by: OBSTETRICS & GYNECOLOGY

## 2023-06-27 PROCEDURE — 1159F PR MEDICATION LIST DOCUMENTED IN MEDICAL RECORD: ICD-10-PCS | Mod: CPTII,S$GLB,, | Performed by: OBSTETRICS & GYNECOLOGY

## 2023-06-27 PROCEDURE — 3008F BODY MASS INDEX DOCD: CPT | Mod: CPTII,S$GLB,, | Performed by: OBSTETRICS & GYNECOLOGY

## 2023-06-27 PROCEDURE — 1160F RVW MEDS BY RX/DR IN RCRD: CPT | Mod: CPTII,S$GLB,, | Performed by: OBSTETRICS & GYNECOLOGY

## 2023-06-27 PROCEDURE — 3078F DIAST BP <80 MM HG: CPT | Mod: CPTII,S$GLB,, | Performed by: OBSTETRICS & GYNECOLOGY

## 2023-06-27 PROCEDURE — 99213 OFFICE O/P EST LOW 20 MIN: CPT | Mod: S$GLB,,, | Performed by: OBSTETRICS & GYNECOLOGY

## 2023-06-27 NOTE — PROGRESS NOTES
Subjective:      Patient ID: Liana Keane is a 52 y.o. female.    Chief Complaint:  Hot Flashes      History of Present Illness  HPI  Pt complains of worsening hot flashes and night sweats.  Would like to discuss options.    GYN & OB History  Patient's last menstrual period was 2021.   Date of Last Pap: No result found    OB History    Para Term  AB Living   4 2 2   2 2   SAB IAB Ectopic Multiple Live Births   1 1     1      # Outcome Date GA Lbr Shahram/2nd Weight Sex Delivery Anes PTL Lv   4 IAB            3 SAB            2 Term     M Vag-Spont      1 Term     M Vag-Spont   BALDOMERO       Review of Systems  Review of Systems   Constitutional:  Negative for activity change, appetite change, chills, fatigue, fever and unexpected weight change.   Respiratory:  Negative for shortness of breath.    Cardiovascular:  Negative for chest pain, palpitations and leg swelling.   Gastrointestinal:  Negative for abdominal pain, bloating, blood in stool, constipation, diarrhea, nausea and vomiting.   Genitourinary:  Positive for hot flashes. Negative for dyspareunia, dysuria, flank pain, frequency, genital sores, hematuria, pelvic pain, urgency, vaginal bleeding, vaginal discharge, vaginal pain, urinary incontinence, postmenopausal bleeding, vaginal dryness and vaginal odor.   Musculoskeletal:  Negative for back pain.   Neurological:  Negative for syncope and headaches.        Objective:     Physical Exam:   Constitutional: She is oriented to person, place, and time. She appears well-developed and well-nourished. No distress.                           Neurological: She is alert and oriented to person, place, and time.     Psychiatric: She has a normal mood and affect. Her behavior is normal. Thought content normal.       Assessment:     1. Hot flashes due to menopause             Plan:     Hot flashes due to menopause  -     Pt was extensively counseled on menopause, including treatment options and lifestyle  modifications.  Risks associated with HRT use were reviewed with pt, including increased risk of heart disease, cardiac event, breast cancer, stoke, VTE, etc.  Medical history was reviewed and pt is a candidate for HRT use if she desires.  Pt voiced understanding and expressed desire to proceed with lifestyle modifications and pt will consider OTC options first.         Follow up in about 6 months (around 12/27/2023) for Annual exam.

## 2023-10-31 ENCOUNTER — HOSPITAL ENCOUNTER (OUTPATIENT)
Dept: RADIOLOGY | Facility: HOSPITAL | Age: 53
Discharge: HOME | End: 2023-10-31
Attending: PHYSICIAN ASSISTANT
Payer: COMMERCIAL

## 2023-10-31 DIAGNOSIS — E04.2 MULTIPLE THYROID NODULES: Chronic | ICD-10-CM

## 2023-10-31 PROCEDURE — 76536 US EXAM OF HEAD AND NECK: CPT | Mod: TC

## 2023-10-31 PROCEDURE — 76536 US EXAM OF HEAD AND NECK: CPT | Mod: 26,,, | Performed by: RADIOLOGY

## 2023-10-31 PROCEDURE — 76536 US SOFT TISSUE HEAD NECK THYROID: ICD-10-PCS | Mod: 26,,, | Performed by: RADIOLOGY

## 2023-11-13 ENCOUNTER — PATIENT MESSAGE (OUTPATIENT)
Dept: ADMINISTRATIVE | Facility: HOSPITAL | Age: 53
End: 2023-11-13
Payer: COMMERCIAL

## 2023-11-28 ENCOUNTER — OFFICE VISIT (OUTPATIENT)
Dept: INTERNAL MEDICINE | Facility: CLINIC | Age: 53
End: 2023-11-28
Payer: COMMERCIAL

## 2023-11-28 ENCOUNTER — LAB VISIT (OUTPATIENT)
Dept: LAB | Facility: HOSPITAL | Age: 53
End: 2023-11-28
Attending: FAMILY MEDICINE
Payer: COMMERCIAL

## 2023-11-28 VITALS
TEMPERATURE: 98 F | WEIGHT: 151.88 LBS | SYSTOLIC BLOOD PRESSURE: 110 MMHG | BODY MASS INDEX: 26.07 KG/M2 | DIASTOLIC BLOOD PRESSURE: 70 MMHG | RESPIRATION RATE: 18 BRPM | HEART RATE: 100 BPM | OXYGEN SATURATION: 100 %

## 2023-11-28 DIAGNOSIS — Z23 NEED FOR COVID-19 VACCINE: ICD-10-CM

## 2023-11-28 DIAGNOSIS — Z13.1 SCREENING FOR DIABETES MELLITUS: ICD-10-CM

## 2023-11-28 DIAGNOSIS — Z00.00 PREVENTATIVE HEALTH CARE: Primary | ICD-10-CM

## 2023-11-28 DIAGNOSIS — F51.04 PSYCHOPHYSIOLOGIC INSOMNIA: ICD-10-CM

## 2023-11-28 DIAGNOSIS — J30.1 SEASONAL ALLERGIC RHINITIS DUE TO POLLEN: ICD-10-CM

## 2023-11-28 DIAGNOSIS — F41.9 ANXIETY: ICD-10-CM

## 2023-11-28 DIAGNOSIS — Z23 NEED FOR INFLUENZA VACCINATION: ICD-10-CM

## 2023-11-28 DIAGNOSIS — Z78.0 ASYMPTOMATIC MENOPAUSAL STATE: ICD-10-CM

## 2023-11-28 DIAGNOSIS — J45.20 MILD INTERMITTENT ASTHMA WITHOUT COMPLICATION: Chronic | ICD-10-CM

## 2023-11-28 DIAGNOSIS — Z12.31 BREAST CANCER SCREENING BY MAMMOGRAM: ICD-10-CM

## 2023-11-28 LAB
ESTIMATED AVG GLUCOSE: 120 MG/DL (ref 68–131)
HBA1C MFR BLD: 5.8 % (ref 4–5.6)

## 2023-11-28 PROCEDURE — 99396 PREV VISIT EST AGE 40-64: CPT | Mod: S$GLB,,, | Performed by: FAMILY MEDICINE

## 2023-11-28 PROCEDURE — 99999 PR PBB SHADOW E&M-EST. PATIENT-LVL IV: CPT | Mod: PBBFAC,,, | Performed by: FAMILY MEDICINE

## 2023-11-28 PROCEDURE — 3044F HG A1C LEVEL LT 7.0%: CPT | Mod: CPTII,S$GLB,, | Performed by: FAMILY MEDICINE

## 2023-11-28 PROCEDURE — 3078F DIAST BP <80 MM HG: CPT | Mod: CPTII,S$GLB,, | Performed by: FAMILY MEDICINE

## 2023-11-28 PROCEDURE — 1160F PR REVIEW ALL MEDS BY PRESCRIBER/CLIN PHARMACIST DOCUMENTED: ICD-10-PCS | Mod: CPTII,S$GLB,, | Performed by: FAMILY MEDICINE

## 2023-11-28 PROCEDURE — 3078F PR MOST RECENT DIASTOLIC BLOOD PRESSURE < 80 MM HG: ICD-10-PCS | Mod: CPTII,S$GLB,, | Performed by: FAMILY MEDICINE

## 2023-11-28 PROCEDURE — 99214 OFFICE O/P EST MOD 30 MIN: CPT | Mod: 25,S$GLB,, | Performed by: FAMILY MEDICINE

## 2023-11-28 PROCEDURE — 3074F PR MOST RECENT SYSTOLIC BLOOD PRESSURE < 130 MM HG: ICD-10-PCS | Mod: CPTII,S$GLB,, | Performed by: FAMILY MEDICINE

## 2023-11-28 PROCEDURE — 1159F PR MEDICATION LIST DOCUMENTED IN MEDICAL RECORD: ICD-10-PCS | Mod: CPTII,S$GLB,, | Performed by: FAMILY MEDICINE

## 2023-11-28 PROCEDURE — 3008F BODY MASS INDEX DOCD: CPT | Mod: CPTII,S$GLB,, | Performed by: FAMILY MEDICINE

## 2023-11-28 PROCEDURE — 99214 PR OFFICE/OUTPT VISIT, EST, LEVL IV, 30-39 MIN: ICD-10-PCS | Mod: 25,S$GLB,, | Performed by: FAMILY MEDICINE

## 2023-11-28 PROCEDURE — 1159F MED LIST DOCD IN RCRD: CPT | Mod: CPTII,S$GLB,, | Performed by: FAMILY MEDICINE

## 2023-11-28 PROCEDURE — 99999 PR PBB SHADOW E&M-EST. PATIENT-LVL IV: ICD-10-PCS | Mod: PBBFAC,,, | Performed by: FAMILY MEDICINE

## 2023-11-28 PROCEDURE — 3074F SYST BP LT 130 MM HG: CPT | Mod: CPTII,S$GLB,, | Performed by: FAMILY MEDICINE

## 2023-11-28 PROCEDURE — 99396 PR PREVENTIVE VISIT,EST,40-64: ICD-10-PCS | Mod: S$GLB,,, | Performed by: FAMILY MEDICINE

## 2023-11-28 PROCEDURE — 1160F RVW MEDS BY RX/DR IN RCRD: CPT | Mod: CPTII,S$GLB,, | Performed by: FAMILY MEDICINE

## 2023-11-28 PROCEDURE — 83036 HEMOGLOBIN GLYCOSYLATED A1C: CPT | Performed by: FAMILY MEDICINE

## 2023-11-28 PROCEDURE — 3008F PR BODY MASS INDEX (BMI) DOCUMENTED: ICD-10-PCS | Mod: CPTII,S$GLB,, | Performed by: FAMILY MEDICINE

## 2023-11-28 PROCEDURE — 36415 COLL VENOUS BLD VENIPUNCTURE: CPT | Performed by: FAMILY MEDICINE

## 2023-11-28 PROCEDURE — 3044F PR MOST RECENT HEMOGLOBIN A1C LEVEL <7.0%: ICD-10-PCS | Mod: CPTII,S$GLB,, | Performed by: FAMILY MEDICINE

## 2023-11-28 RX ORDER — FLUTICASONE PROPIONATE 50 MCG
2 SPRAY, SUSPENSION (ML) NASAL DAILY
Qty: 16 G | Refills: 11 | Status: SHIPPED | OUTPATIENT
Start: 2023-11-28

## 2023-11-28 RX ORDER — ZOLPIDEM TARTRATE 5 MG/1
5 TABLET ORAL NIGHTLY PRN
Qty: 30 TABLET | Refills: 5 | Status: SHIPPED | OUTPATIENT
Start: 2023-11-28 | End: 2024-01-29 | Stop reason: SDUPTHER

## 2023-11-28 RX ORDER — MINERAL OIL
180 ENEMA (ML) RECTAL DAILY
COMMUNITY
Start: 2023-11-28

## 2023-11-28 RX ORDER — ALBUTEROL SULFATE 90 UG/1
2 AEROSOL, METERED RESPIRATORY (INHALATION) EVERY 6 HOURS PRN
Qty: 8.5 G | Refills: 5 | Status: SHIPPED | OUTPATIENT
Start: 2023-11-28

## 2023-11-28 RX ORDER — CETIRIZINE HYDROCHLORIDE 10 MG/1
10 TABLET ORAL DAILY
COMMUNITY
Start: 2023-11-28

## 2023-11-28 RX ORDER — BUSPIRONE HYDROCHLORIDE 15 MG/1
15 TABLET ORAL 2 TIMES DAILY
Qty: 60 TABLET | Refills: 2 | Status: SHIPPED | OUTPATIENT
Start: 2023-11-28 | End: 2024-01-29 | Stop reason: SDUPTHER

## 2023-11-28 RX ORDER — MULTIVITAMIN
1 TABLET ORAL 2 TIMES DAILY
COMMUNITY
Start: 2023-11-28 | End: 2025-04-11

## 2023-11-28 NOTE — PROGRESS NOTES
ANNUAL WELLNESS VISIT (PREVENTIVE SERVICES)  11/28/23  8:20 AM CST    CHIEF COMPLAINT: Annual Exam    HEALTH MAINTENANCE INTERVENTIONS - UP TO DATE  Health Maintenance Topics with due status: Not Due       Topic Last Completion Date    TETANUS VACCINE 10/19/2015    Colorectal Cancer Screening 04/25/2021    Cervical Cancer Screening 11/30/2021    Lipid Panel 03/31/2023    Hemoglobin A1c (Prediabetes) 11/28/2023     HEALTH MAINTENANCE INTERVENTIONS - DUE OR DUE SOON  Health Maintenance Due   Topic Date Due    Pneumococcal Vaccines (Age 0-64) (2 - PCV) 07/15/2022    Influenza Vaccine (1) 09/01/2023    COVID-19 Vaccine (5 - 2023-24 season) 09/01/2023    Mammogram  01/17/2024      Liana is here for annual wellness and preventive services visit.  Health maintenance care gaps reviewed; relevant closure actions recommended.  CDC-recommended immunizations advocated.  USPSTF-recommended screenings advocated.  Therapeutic lifestyle changes encouraged.       Liana is also here for evaluation and management of unrelated problems reported separately.      1. Preventative health care  -     multivitamin-Ca-iron-minerals Tab; Take 1 tablet by mouth once daily.    2. Need for influenza vaccination    3. Need for COVID-19 vaccine    4. Screening for diabetes mellitus  -     Hemoglobin A1C; Future; Expected date: 11/28/2023    5. Breast cancer screening by mammogram  -     Mammo Digital Screening Bilat; Future; Expected date: 01/19/2024    Review of Systems   Respiratory:  Negative for chest tightness and shortness of breath.    Cardiovascular:  Negative for chest pain.   Endocrine: Negative for polydipsia and polyuria.   Psychiatric/Behavioral:  Positive for sleep disturbance.        Vitals:    11/28/23 0825   BP: 110/70   Pulse: 100   Resp: 18   Temp: 97.9 °F (36.6 °C)   SpO2: 100%   Weight: 68.9 kg (151 lb 14.4 oz)   Physical Exam  Vitals reviewed.   Constitutional:       General: She is not in acute distress.     Appearance: Normal  "appearance. She is not ill-appearing, toxic-appearing or diaphoretic.   Cardiovascular:      Rate and Rhythm: Normal rate.   Pulmonary:      Effort: Pulmonary effort is normal.   Neurological:      General: No focal deficit present.      Mental Status: She is alert and oriented to person, place, and time.   Psychiatric:         Mood and Affect: Mood normal.         Behavior: Behavior normal.         Thought Content: Thought content normal.         Judgment: Judgment normal.       Documentation entered by me for this encounter may have been done in part using speech-recognition technology. Although I have made an effort to ensure accuracy, "sound like" errors may exist and should be interpreted in context.    ADDITIONAL E&M SERVICES PROVIDED - UNRELATED TO PREVENTIVE SERVICES  11/28/23  8:20 AM CST    In addition to and unrelated to the preventive services provided this date, the following condition(s) were also evaluated and managed.    CHIEF COMPLAINT: Anxiety    HPI: Her worsening problem is anxiety. 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 She is seeing a mental health counselor using [employer]. She tried bupropion previously for depression symptoms, but she said she did not tolerate it due to GI side effects. She identifies anxiety as the predominant complaint today. We discussed the risks and benefits of treatment options. It was agreed to begin her on a trial of therapy with bupropion and reevaluate in about two months.    She reports chronic recurrent insomnia since she had COVID a year or so ago, but it is getting worse, exacerbated by anxiety. She has tried over-the-counter melatonin, Benadryl, and guided meditation. None of these were effective. She has no history of chemical dependency. We discussed treatment options. It was agreed to begin a trial " of therapy with Ambien. We discussed how she should take the medication and the potential for anterograde amnesia.    Allergies and asthma are well-controlled    6. Anxiety  Overview:  Sees [facility] mental health counselor once a week.    Orders:  -     busPIRone (BUSPAR) 15 MG tablet; Take 1 tablet (15 mg total) by mouth 2 (two) times daily.  Dispense: 60 tablet; Refill: 2    7. Psychophysiologic insomnia  -     zolpidem (AMBIEN) 5 MG Tab; Take 1 tablet (5 mg total) by mouth nightly as needed (for insomnia).  Dispense: 30 tablet; Refill: 5    8. Mild intermittent asthma without complication  -     albuterol (PROAIR HFA) 90 mcg/actuation inhaler; Inhale 2 puffs into the lungs every 6 (six) hours as needed for Wheezing or Shortness of Breath (or Cough).  Dispense: 8.5 g; Refill: 5    9. Seasonal allergic rhinitis due to pollen  -     cetirizine (ZYRTEC) 10 MG tablet; Take 1 tablet (10 mg total) by mouth once daily. Do not take cetirizine (Zyrtec) and fexofenadine (Allegra) on the same day.  -     fexofenadine (ALLEGRA) 180 MG tablet; Take 1 tablet (180 mg total) by mouth once daily. Do not take cetirizine (Zyrtec) and fexofenadine (Allegra) on the same day.  -     fluticasone propionate (FLONASE) 50 mcg/actuation nasal spray; 2 sprays (100 mcg total) by Each Nostril route once daily.  Dispense: 16 g; Refill: 11    10. Asymptomatic menopausal state  -     multivitamin-Ca-iron-minerals Tab; Take 1 tablet by mouth once daily.  -     calcium-vitamin D (CALCIUM WITH VITAMIN D) 600 mg-10 mcg (400 unit) Tab; Take 1 tablet by mouth 2 (two) times a day. for 999 doses    Unless noted herein, any chronic conditions are represented as and appear compensated/controlled and stable, and no other significant complaints or concerns were reported.    PHYSICAL EXAM  Vitals:    11/28/23 0825   BP: 110/70   Pulse: 100   Resp: 18   Temp: 97.9 °F (36.6 °C)   SpO2: 100%   Weight: 68.9 kg (151 lb 14.4 oz)     Physical Exam  Vitals reviewed.  "  Constitutional:       General: She is not in acute distress.     Appearance: Normal appearance. She is not ill-appearing, toxic-appearing or diaphoretic.   Cardiovascular:      Rate and Rhythm: Normal rate.   Pulmonary:      Effort: Pulmonary effort is normal.   Neurological:      General: No focal deficit present.      Mental Status: She is alert and oriented to person, place, and time.   Psychiatric:         Mood and Affect: Mood normal.         Behavior: Behavior normal.         Thought Content: Thought content normal.         Judgment: Judgment normal.       Follow up in about 2 months (around 1/28/2024) for virtual visit, re-evaluation.  Documentation entered by me for this encounter may have been done in part using speech-recognition technology. Although I have made an effort to ensure accuracy, "sound like" errors may exist and should be interpreted in context.    "

## 2023-12-03 PROBLEM — R73.03 PREDIABETES: Chronic | Status: ACTIVE | Noted: 2023-12-03

## 2023-12-03 NOTE — PROGRESS NOTES
"Liana Mcfadden.    Your hemoglobin A1c ("A1c" - a test used to evaluate for diabetes) is abnormal (too high). Yours is in the 5.7 - 6.4 range, which means you have pre-diabetes and are at increased risk of developing diabetes in the future. You would be considered to have diabetes if your A1c was 6.5 or greater.    Pre-diabetes means that your blood sugar levels are higher than normal but not yet high enough to be called diabetes. People with pre-diabetes are likely to progress to diabetes unless they make some healthy lifestyle changes.    The best treatments for pre-diabetes are (1) healthy physical activity, and (2) healthy eating habits - the same kind of healthy eating that would be good for someone with diabetes -- for example, lots of healthy, colorful vegetables and lean sources of protein.    Please take time to learn about healthy eating habits to prevent diabetes at the website of the American Diabetes Association, www.diabetes.org.    When you return for your next appointment, we can talk more about pre-diabetes and what can be done to lower your risk for developing diabetes, heart disease, and stroke.    In the meantime: (1) Eat healthy foods. (2) Get more physical activity. (3) Work on losing excess fat.     Thanks for letting me care for you, and thanks for trusting [facility] with your healthcare needs.    All the best,    LATISHA Simmons MD"

## 2023-12-27 ENCOUNTER — OFFICE VISIT (OUTPATIENT)
Dept: OBSTETRICS AND GYNECOLOGY | Facility: CLINIC | Age: 53
End: 2023-12-27
Payer: COMMERCIAL

## 2023-12-27 VITALS
WEIGHT: 151.69 LBS | HEIGHT: 64 IN | DIASTOLIC BLOOD PRESSURE: 80 MMHG | SYSTOLIC BLOOD PRESSURE: 128 MMHG | BODY MASS INDEX: 25.9 KG/M2

## 2023-12-27 DIAGNOSIS — N95.1 HOT FLASHES DUE TO MENOPAUSE: ICD-10-CM

## 2023-12-27 DIAGNOSIS — Z01.419 WELL WOMAN EXAM WITH ROUTINE GYNECOLOGICAL EXAM: Primary | ICD-10-CM

## 2023-12-27 PROCEDURE — 3008F PR BODY MASS INDEX (BMI) DOCUMENTED: ICD-10-PCS | Mod: CPTII,S$GLB,, | Performed by: OBSTETRICS & GYNECOLOGY

## 2023-12-27 PROCEDURE — 1159F MED LIST DOCD IN RCRD: CPT | Mod: CPTII,S$GLB,, | Performed by: OBSTETRICS & GYNECOLOGY

## 2023-12-27 PROCEDURE — 1160F PR REVIEW ALL MEDS BY PRESCRIBER/CLIN PHARMACIST DOCUMENTED: ICD-10-PCS | Mod: CPTII,S$GLB,, | Performed by: OBSTETRICS & GYNECOLOGY

## 2023-12-27 PROCEDURE — 1159F PR MEDICATION LIST DOCUMENTED IN MEDICAL RECORD: ICD-10-PCS | Mod: CPTII,S$GLB,, | Performed by: OBSTETRICS & GYNECOLOGY

## 2023-12-27 PROCEDURE — 3079F DIAST BP 80-89 MM HG: CPT | Mod: CPTII,S$GLB,, | Performed by: OBSTETRICS & GYNECOLOGY

## 2023-12-27 PROCEDURE — 99999 PR PBB SHADOW E&M-EST. PATIENT-LVL III: CPT | Mod: PBBFAC,,, | Performed by: OBSTETRICS & GYNECOLOGY

## 2023-12-27 PROCEDURE — 3008F BODY MASS INDEX DOCD: CPT | Mod: CPTII,S$GLB,, | Performed by: OBSTETRICS & GYNECOLOGY

## 2023-12-27 PROCEDURE — 3044F PR MOST RECENT HEMOGLOBIN A1C LEVEL <7.0%: ICD-10-PCS | Mod: CPTII,S$GLB,, | Performed by: OBSTETRICS & GYNECOLOGY

## 2023-12-27 PROCEDURE — 3079F PR MOST RECENT DIASTOLIC BLOOD PRESSURE 80-89 MM HG: ICD-10-PCS | Mod: CPTII,S$GLB,, | Performed by: OBSTETRICS & GYNECOLOGY

## 2023-12-27 PROCEDURE — 3044F HG A1C LEVEL LT 7.0%: CPT | Mod: CPTII,S$GLB,, | Performed by: OBSTETRICS & GYNECOLOGY

## 2023-12-27 PROCEDURE — 3074F SYST BP LT 130 MM HG: CPT | Mod: CPTII,S$GLB,, | Performed by: OBSTETRICS & GYNECOLOGY

## 2023-12-27 PROCEDURE — 99396 PREV VISIT EST AGE 40-64: CPT | Mod: S$GLB,,, | Performed by: OBSTETRICS & GYNECOLOGY

## 2023-12-27 PROCEDURE — 3074F PR MOST RECENT SYSTOLIC BLOOD PRESSURE < 130 MM HG: ICD-10-PCS | Mod: CPTII,S$GLB,, | Performed by: OBSTETRICS & GYNECOLOGY

## 2023-12-27 PROCEDURE — 99396 PR PREVENTIVE VISIT,EST,40-64: ICD-10-PCS | Mod: S$GLB,,, | Performed by: OBSTETRICS & GYNECOLOGY

## 2023-12-27 PROCEDURE — 1160F RVW MEDS BY RX/DR IN RCRD: CPT | Mod: CPTII,S$GLB,, | Performed by: OBSTETRICS & GYNECOLOGY

## 2023-12-27 PROCEDURE — 99999 PR PBB SHADOW E&M-EST. PATIENT-LVL III: ICD-10-PCS | Mod: PBBFAC,,, | Performed by: OBSTETRICS & GYNECOLOGY

## 2023-12-27 NOTE — PROGRESS NOTES
Subjective:      Patient ID: Liana Keane is a 53 y.o. female.    Chief Complaint:  Annual Exam      History of Present Illness  HPI  Annual Exam-Postmenopausal  Patient presents for annual exam. The patient has no complaints today. The patient is sexually active. GYN screening history: last pap: approximate date  and was normal and last mammogram: approximate date  and was normal. The patient is not taking hormone replacement therapy. Patient denies post-menopausal vaginal bleeding. The patient wears seatbelts: yes. The patient participates in regular exercise: yes. Has the patient ever been transfused or tattooed?: no. The patient reports that there is not domestic violence in her life.  Doing much better with hot flashes.    GYN & OB History  Patient's last menstrual period was 2021.   Date of Last Pap: No result found    OB History    Para Term  AB Living   4 2 2   2 2   SAB IAB Ectopic Multiple Live Births   1 1     1      # Outcome Date GA Lbr Shahram/2nd Weight Sex Delivery Anes PTL Lv   4 IAB            3 SAB            2 Term     M Vag-Spont      1 Term     M Vag-Spont   BALDOMERO       Review of Systems  Review of Systems   Constitutional:  Negative for activity change, appetite change, chills, fatigue, fever and unexpected weight change.   Respiratory:  Negative for shortness of breath.    Cardiovascular:  Negative for chest pain, palpitations and leg swelling.   Gastrointestinal:  Negative for abdominal pain, bloating, blood in stool, constipation, diarrhea, nausea and vomiting.   Genitourinary:  Positive for hot flashes. Negative for dyspareunia, dysuria, flank pain, frequency, genital sores, hematuria, pelvic pain, urgency, vaginal bleeding, vaginal discharge, vaginal pain, urinary incontinence, postcoital bleeding, vaginal dryness and vaginal odor.   Musculoskeletal:  Negative for back pain.   Integumentary:  Negative for breast mass, nipple discharge, breast skin changes and  breast tenderness.   Neurological:  Negative for syncope and headaches.   Breast: Negative for asymmetry, lump, mass, mastodynia, nipple discharge, skin changes and tenderness         Objective:     Physical Exam:   Constitutional: She is oriented to person, place, and time. She appears well-developed and well-nourished. No distress.    HENT:   Head: Normocephalic and atraumatic.    Eyes: Pupils are equal, round, and reactive to light. EOM are normal.     Cardiovascular:  Normal rate, regular rhythm and normal heart sounds.             Pulmonary/Chest: Effort normal and breath sounds normal.        Abdominal: Soft. Bowel sounds are normal. She exhibits no distension. There is no abdominal tenderness.     Genitourinary:    Vagina, uterus, right adnexa and left adnexa normal.      Pelvic exam was performed with patient supine.   There is no rash, tenderness, lesion or injury on the right labia. There is no rash, tenderness, lesion or injury on the left labia. Cervix is normal. Right adnexum displays no mass, no tenderness and no fullness. Left adnexum displays no mass, no tenderness and no fullness. No erythema, vaginal discharge, tenderness or bleeding in the vagina.    No foreign body in the vagina.      No signs of injury in the vagina.   Cervix exhibits no motion tenderness, no discharge and no friability. Uterus is not deviated, not enlarged and not tender.           Musculoskeletal: Normal range of motion and moves all extremeties. No tenderness or edema.       Neurological: She is alert and oriented to person, place, and time.    Skin: Skin is warm and dry.    Psychiatric: She has a normal mood and affect. Her behavior is normal. Thought content normal.         Assessment:     1. Well woman exam with routine gynecological exam    2. Hot flashes due to menopause             Plan:     Well woman exam with routine gynecological exam  -     Pt was counseled on cervical/vaginal screening guidelines and  recommendations.  Last pap NILM on 2021.  As per current ASCCP guidelines, next pap is due 2024.  -     Pt was advised on current breast cancer screening recommendations.  Pt desires to proceed with screening MMG.  -     Follow up with PCP for routine health maintenance needs.    Hot flashes due to menopause  -     Improving and doing well with lifestyle modifications.      Follow up in about 1 year (around 12/27/2024).

## 2024-01-19 ENCOUNTER — HOSPITAL ENCOUNTER (OUTPATIENT)
Dept: RADIOLOGY | Facility: HOSPITAL | Age: 54
Discharge: HOME | End: 2024-01-19
Attending: FAMILY MEDICINE
Payer: COMMERCIAL

## 2024-01-19 VITALS — HEIGHT: 64 IN | WEIGHT: 149.94 LBS | BODY MASS INDEX: 25.6 KG/M2

## 2024-01-19 DIAGNOSIS — Z12.31 BREAST CANCER SCREENING BY MAMMOGRAM: ICD-10-CM

## 2024-01-19 PROCEDURE — 77067 SCR MAMMO BI INCL CAD: CPT | Mod: 26,,, | Performed by: RADIOLOGY

## 2024-01-19 PROCEDURE — 77067 SCR MAMMO BI INCL CAD: CPT | Mod: TC

## 2024-01-19 PROCEDURE — 77063 BREAST TOMOSYNTHESIS BI: CPT | Mod: 26,,, | Performed by: RADIOLOGY

## 2024-01-22 ENCOUNTER — TELEPHONE (OUTPATIENT)
Dept: INTERNAL MEDICINE | Facility: CLINIC | Age: 54
End: 2024-01-22
Payer: COMMERCIAL

## 2024-01-23 ENCOUNTER — TELEPHONE (OUTPATIENT)
Dept: INTERNAL MEDICINE | Facility: CLINIC | Age: 54
End: 2024-01-23
Payer: COMMERCIAL

## 2024-01-29 ENCOUNTER — OFFICE VISIT (OUTPATIENT)
Dept: INTERNAL MEDICINE | Facility: CLINIC | Age: 54
End: 2024-01-29
Payer: COMMERCIAL

## 2024-01-29 DIAGNOSIS — F41.9 ANXIETY: Primary | ICD-10-CM

## 2024-01-29 DIAGNOSIS — J45.20 MILD INTERMITTENT ASTHMA WITHOUT COMPLICATION: Chronic | ICD-10-CM

## 2024-01-29 DIAGNOSIS — F51.04 PSYCHOPHYSIOLOGIC INSOMNIA: ICD-10-CM

## 2024-01-29 PROCEDURE — 1160F RVW MEDS BY RX/DR IN RCRD: CPT | Mod: CPTII,95,, | Performed by: FAMILY MEDICINE

## 2024-01-29 PROCEDURE — 99214 OFFICE O/P EST MOD 30 MIN: CPT | Mod: 95,,, | Performed by: FAMILY MEDICINE

## 2024-01-29 PROCEDURE — 1159F MED LIST DOCD IN RCRD: CPT | Mod: CPTII,95,, | Performed by: FAMILY MEDICINE

## 2024-01-29 RX ORDER — BUSPIRONE HYDROCHLORIDE 15 MG/1
15 TABLET ORAL 2 TIMES DAILY
Qty: 180 TABLET | Refills: 3 | Status: SHIPPED | OUTPATIENT
Start: 2024-01-29

## 2024-01-29 RX ORDER — ZOLPIDEM TARTRATE 5 MG/1
5 TABLET ORAL NIGHTLY PRN
Qty: 90 TABLET | Refills: 1 | Status: SHIPPED | OUTPATIENT
Start: 2024-01-29 | End: 2024-06-13 | Stop reason: SDUPTHER

## 2024-01-29 NOTE — PROGRESS NOTES
TELEMEDICINE VIRTUAL VIDEO VISIT  1/29/24  7:00 AM CST    Visit Type: Audiovisual    Patient's Location: Liana represents that they are located within the [facility].    CHIEF COMPLAINT: Follow-up, Refills    Chronic conditions are represented as and appear to be compensated/controlled and stable.  No new complaints or concerns reported.        1. Anxiety  Overview:  Sees [facility] mental health counselor once a week.    Assessment & Plan:  This is a chronic problem that appears compensated/controlled and stable/improved on buspirone 15 mg BID. Liana reports that she is doing well on current medicine, she perceives no adverse side effects, and she wants to continue her current treatment.     Orders:  -     busPIRone (BUSPAR) 15 MG tablet; Take 1 tablet (15 mg total) by mouth 2 (two) times daily.  Dispense: 180 tablet; Refill: 3    2. Psychophysiologic insomnia  Assessment & Plan:  This is a chronic problem that appears compensated/controlled and stable/improved on zolpidem 5 mg QHS PRN (taking most nights). Liana reports that she is doing well on current medicine, she perceives no adverse side effects, and she wants to continue her current treatment.     Orders:  -     zolpidem (AMBIEN) 5 MG Tab; Take 1 tablet (5 mg total) by mouth nightly as needed (for insomnia).  Dispense: 90 tablet; Refill: 1    3. Mild intermittent asthma without complication  Assessment & Plan:  This is a chronic problem that appears compensated/controlled and stable on current treatment.      Unless noted herein, any chronic conditions are represented as and appear stable, and no other significant complaints or concerns were reported.    Review of Systems   Constitutional:  Negative for activity change and unexpected weight change.   HENT:  Negative for hearing loss, rhinorrhea and trouble swallowing.    Eyes:  Negative for discharge and visual disturbance.   Respiratory:  Negative for chest tightness and wheezing.    Cardiovascular:   "Negative for chest pain and palpitations.   Gastrointestinal:  Negative for blood in stool, constipation, diarrhea and vomiting.   Endocrine: Negative for polydipsia and polyuria.   Genitourinary:  Negative for difficulty urinating, dysuria, hematuria and menstrual problem.   Musculoskeletal:  Negative for arthralgias, joint swelling and neck pain.   Neurological:  Negative for weakness and headaches.   Psychiatric/Behavioral:  Negative for confusion and dysphoric mood.        Physical Exam  Constitutional:       General: She is not in acute distress.     Appearance: Normal appearance. She is not ill-appearing.   Pulmonary:      Effort: Pulmonary effort is normal. No respiratory distress.   Skin:     Coloration: Skin is not jaundiced.   Neurological:      Mental Status: She is alert. Mental status is at baseline.   Psychiatric:         Mood and Affect: Mood normal.         Behavior: Behavior normal.         Thought Content: Thought content normal.       Follow up in about 6 months (around 7/19/2024) for re-evaluation.     Documentation entered by me for this encounter may have been done in part using speech-recognition technology. Although I have made an effort to ensure accuracy, "sound like" errors may exist and should be interpreted in context.   TOTAL TIME evaluating and managing this patient for this encounter was greater than or equal to 12 minutes.  This time was exclusive of any separately billable procedures for this patient and exclusive of time spent treating any other patient.    Documentation entered by me for this encounter may have been done in part using speech-recognition technology. Although I have made an effort to ensure accuracy, "sound like" errors may exist and should be interpreted in context.    Each patient to whom medical services are provided by telemedicine is: (1) informed of the relationship between the physician and patient and the respective role of any other health care provider " with respect to management of the patient; and (2) notified that he or she may decline to receive medical services by telemedicine and may withdraw from such care at any time.

## 2024-01-29 NOTE — ASSESSMENT & PLAN NOTE
This is a chronic problem that appears compensated/controlled and stable/improved on buspirone 15 mg BID. Liana reports that she is doing well on current medicine, she perceives no adverse side effects, and she wants to continue her current treatment.

## 2024-01-29 NOTE — ASSESSMENT & PLAN NOTE
This is a chronic problem that appears compensated/controlled and stable/improved on zolpidem 5 mg QHS PRN (taking most nights). Liana reports that she is doing well on current medicine, she perceives no adverse side effects, and she wants to continue her current treatment.

## 2024-02-19 ENCOUNTER — PATIENT OUTREACH (OUTPATIENT)
Dept: ADMINISTRATIVE | Facility: HOSPITAL | Age: 54
End: 2024-02-19
Payer: COMMERCIAL

## 2024-02-19 ENCOUNTER — PATIENT MESSAGE (OUTPATIENT)
Dept: ADMINISTRATIVE | Facility: HOSPITAL | Age: 54
End: 2024-02-19
Payer: COMMERCIAL

## 2024-02-19 DIAGNOSIS — Z12.11 SCREENING FOR COLON CANCER: Primary | ICD-10-CM

## 2024-02-19 NOTE — PROGRESS NOTES
EPIC CAMPAIGN: per pt portal response colon cancer screen requested, will verify with pt if she would like to repeat cologuard.  Waiting for pt reply.

## 2024-03-06 ENCOUNTER — HOSPITAL ENCOUNTER (OUTPATIENT)
Dept: PREADMISSION TESTING | Facility: HOSPITAL | Age: 54
Discharge: HOME | End: 2024-03-06
Attending: FAMILY MEDICINE
Payer: COMMERCIAL

## 2024-03-06 DIAGNOSIS — Z12.11 SCREENING FOR COLON CANCER: Primary | ICD-10-CM

## 2024-03-06 RX ORDER — SODIUM, POTASSIUM,MAG SULFATES 17.5-3.13G
1 SOLUTION, RECONSTITUTED, ORAL ORAL DAILY
Qty: 1 KIT | Refills: 0 | Status: SHIPPED | OUTPATIENT
Start: 2024-03-06 | End: 2024-03-09

## 2024-03-29 ENCOUNTER — PATIENT MESSAGE (OUTPATIENT)
Dept: INTERNAL MEDICINE | Facility: CLINIC | Age: 54
End: 2024-03-29
Payer: COMMERCIAL

## 2024-04-08 ENCOUNTER — ANESTHESIA EVENT (OUTPATIENT)
Dept: ENDOSCOPY | Facility: HOSPITAL | Age: 54
End: 2024-04-08
Payer: COMMERCIAL

## 2024-04-08 ENCOUNTER — HOSPITAL ENCOUNTER (OUTPATIENT)
Facility: HOSPITAL | Age: 54
Discharge: HOME | End: 2024-04-08
Attending: INTERNAL MEDICINE | Admitting: INTERNAL MEDICINE
Payer: COMMERCIAL

## 2024-04-08 ENCOUNTER — ANESTHESIA (OUTPATIENT)
Dept: ENDOSCOPY | Facility: HOSPITAL | Age: 54
End: 2024-04-08
Payer: COMMERCIAL

## 2024-04-08 DIAGNOSIS — Z12.11 COLON CANCER SCREENING: ICD-10-CM

## 2024-04-08 PROCEDURE — 25000003 PHARM REV CODE 250: Performed by: NURSE ANESTHETIST, CERTIFIED REGISTERED

## 2024-04-08 PROCEDURE — 88305 TISSUE EXAM BY PATHOLOGIST: CPT | Performed by: PATHOLOGY

## 2024-04-08 PROCEDURE — 45380 COLONOSCOPY AND BIOPSY: CPT | Mod: 33,,, | Performed by: INTERNAL MEDICINE

## 2024-04-08 PROCEDURE — 37000009 HC ANESTHESIA EA ADD 15 MINS: Performed by: INTERNAL MEDICINE

## 2024-04-08 PROCEDURE — 88305 TISSUE EXAM BY PATHOLOGIST: CPT | Mod: 26,,, | Performed by: PATHOLOGY

## 2024-04-08 PROCEDURE — 27201012 HC FORCEPS, HOT/COLD, DISP: Performed by: INTERNAL MEDICINE

## 2024-04-08 PROCEDURE — 25000003 PHARM REV CODE 250: Performed by: INTERNAL MEDICINE

## 2024-04-08 PROCEDURE — 63600175 PHARM REV CODE 636 W HCPCS: Performed by: NURSE ANESTHETIST, CERTIFIED REGISTERED

## 2024-04-08 PROCEDURE — 45385 COLONOSCOPY W/LESION REMOVAL: CPT | Performed by: INTERNAL MEDICINE

## 2024-04-08 PROCEDURE — 37000008 HC ANESTHESIA 1ST 15 MINUTES: Performed by: INTERNAL MEDICINE

## 2024-04-08 RX ORDER — PROPOFOL 10 MG/ML
VIAL (ML) INTRAVENOUS
Status: DISCONTINUED | OUTPATIENT
Start: 2024-04-08 | End: 2024-04-08

## 2024-04-08 RX ORDER — LIDOCAINE HYDROCHLORIDE 10 MG/ML
INJECTION, SOLUTION EPIDURAL; INFILTRATION; INTRACAUDAL; PERINEURAL
Status: DISCONTINUED | OUTPATIENT
Start: 2024-04-08 | End: 2024-04-08

## 2024-04-08 RX ORDER — DEXTROMETHORPHAN/PSEUDOEPHED 2.5-7.5/.8
DROPS ORAL
Status: DISCONTINUED | OUTPATIENT
Start: 2024-04-08 | End: 2024-04-08 | Stop reason: HOSPADM

## 2024-04-08 RX ORDER — SODIUM CHLORIDE 9 MG/ML
INJECTION, SOLUTION INTRAVENOUS CONTINUOUS
Status: CANCELLED | OUTPATIENT
Start: 2024-04-08

## 2024-04-08 RX ADMIN — LIDOCAINE HYDROCHLORIDE 50 MG: 10 SOLUTION INTRAVENOUS at 09:04

## 2024-04-08 RX ADMIN — PROPOFOL 30 MG: 10 INJECTION, EMULSION INTRAVENOUS at 09:04

## 2024-04-08 RX ADMIN — PROPOFOL 90 MG: 10 INJECTION, EMULSION INTRAVENOUS at 09:04

## 2024-04-08 RX ADMIN — SODIUM CHLORIDE, SODIUM LACTATE, POTASSIUM CHLORIDE, AND CALCIUM CHLORIDE: .6; .31; .03; .02 INJECTION, SOLUTION INTRAVENOUS at 09:04

## 2024-04-08 NOTE — DISCHARGE SUMMARY
[facility] - Endoscopy (Hospital)  Discharge Note  Short Stay    Procedure(s) (LRB):  COLONOSCOPY (N/A)      OUTCOME: Patient tolerated treatment/procedure well without complication and is now ready for discharge.    DISPOSITION: home    FINAL DIAGNOSIS:  Colon cancer screening    FOLLOWUP: With primary care provider    DISCHARGE INSTRUCTIONS:  No discharge procedures on file.     TIME SPENT ON DISCHARGE: 20 minutes

## 2024-04-08 NOTE — H&P
Endoscopy History and Physical    PCP - JEFF Simmons MD  Referring Physician - JEFF Simmons MD  [id number] [address] [facility]  [place] [address]      ASA - per anesthesia  Mallampati - per anesthesia  History of Anesthesia problems - no  Family history Anesthesia problems -  no   Plan of anesthesia - General    HPI  53 y.o. female    Planned Procedure: Colonoscopy  Diagnosis: screening for colon cancer  Chief Complaint: Same as above    Personnel H/o colon polyps:index  FH of colon cancer:no  Anticoagulation:no      ROS:  Constitutional: No fevers, chills, No weight loss  CV: No chest pain  Pulm: No cough, No shortness of breath  GI: see HPI    Medical History:  has a past medical history of Acne, Anemia, Herpes simplex type II infection, History of COVID-19 (12/23/2020) (03/25/2021), History of partial thyroidectomy (03/25/2021), Mild intermittent asthma without complication, Multinodular goiter, Multiple thyroid nodules (03/25/2021), Prediabetes (12/03/2023), and Seasonal allergic rhinitis due to pollen.    Surgical History:  has a past surgical history that includes FNA thyroid nodule (04/2014); Thyroidectomy, partial (02/2015); Hernia repair; and uterine ablation (12/27/2017).    Family History: family history includes Allergic rhinitis in her sister; Arthritis in her maternal grandmother; Asthma in her sister; Cancer in an other family member; Diabetes in her maternal grandfather; Hypertension in her maternal grandmother and mother; Thyroid disease in her mother..    Social History:  reports that she has never smoked. She has never used smokeless tobacco. She reports that she does not currently use alcohol. She reports that she does not use drugs.    Review of patient's allergies indicates:   Allergen Reactions    Adhesive Rash     bandaids    Neosporin [neomycin-bacitracin-polymyxin] Rash       Medications:   Medications Prior to Admission   Medication Sig Dispense Refill Last Dose    albuterol (PROAIR  HFA) 90 mcg/actuation inhaler Inhale 2 puffs into the lungs every 6 (six) hours as needed for Wheezing or Shortness of Breath (or Cough). 8.5 g 5 Past Month    busPIRone (BUSPAR) 15 MG tablet Take 1 tablet (15 mg total) by mouth 2 (two) times daily. 180 tablet 3 Past Week    calcium-vitamin D (CALCIUM WITH VITAMIN D) 600 mg-10 mcg (400 unit) Tab Take 1 tablet by mouth 2 (two) times a day. for 999 doses   Past Week    cetirizine (ZYRTEC) 10 MG tablet Take 1 tablet (10 mg total) by mouth once daily. Do not take cetirizine (Zyrtec) and fexofenadine (Allegra) on the same day.   Past Week    fexofenadine (ALLEGRA) 180 MG tablet Take 1 tablet (180 mg total) by mouth once daily. Do not take cetirizine (Zyrtec) and fexofenadine (Allegra) on the same day.   Past Week    fluticasone propionate (FLONASE) 50 mcg/actuation nasal spray 2 sprays (100 mcg total) by Each Nostril route once daily. 16 g 11 Past Week    multivitamin-Ca-iron-minerals Tab Take 1 tablet by mouth once daily.   Past Week    zolpidem (AMBIEN) 5 MG Tab Take 1 tablet (5 mg total) by mouth nightly as needed (for insomnia). 90 tablet 1 Past Week       Physical Exam:    Vital Signs:   Vitals:    04/08/24 0825   Temp: 98.4 °F (36.9 °C)       General Appearance: Well appearing in no acute distress  Abdomen: Soft, non tender, non distended with normal bowel sounds, no masses    Labs:  Lab Results   Component Value Date    WBC 4.36 03/31/2023    HGB 12.9 03/31/2023    HCT 39.9 03/31/2023     03/31/2023    CHOL 239 (H) 03/31/2023    TRIG 68 03/31/2023    HDL 81 (H) 03/31/2023    ALT 25 03/31/2023    AST 23 03/31/2023     03/31/2023    K 4.1 03/31/2023     03/31/2023    CREATININE 1.0 03/31/2023    BUN 9 03/31/2023    CO2 27 03/31/2023    TSH 0.660 03/31/2023    HGBA1C 5.8 (H) 11/28/2023       I have explained the risks and benefits of this endoscopic procedure to the patient including but not limited to bleeding, inflammation, infection,  perforation, and death.    SEDATION PLAN: per anesthesia       History reviewed, vital signs satisfactory, cardiopulmonary status satisfactory, sedation options, risks and plans have been discussed with the patient  All their questions were answered and the patient agrees to the sedation procedures as planned and the patient is deemed an appropriate candidate for the sedation as planned.     The risks, benefits and alternatives of the procedure were discussed with the patient in detail. This discussion was had in the presence of endoscopy staff. The risks include, risks of adverse reaction to sedation requiring the use of reversal agents, bleeding requiring blood transfusion, perforation requiring surgical intervention and technical failure. Other risks include aspiration leading to respiratory distress and respiratory failure resulting in endotracheal intubation and mechanical ventilation including death. If anesthesia is being utilized for this procedure, it is up to the anesthesiologist to determine airway safety including elective endotracheal intubation. Questions were answered, they agree to proceed. There was no language barriers.       Procedure explained to patient, informed consent obtained and placed in chart.       Gurwinder Llanos MD

## 2024-04-08 NOTE — PROVATION PATIENT INSTRUCTIONS
Discharge Summary/Instructions after an Endoscopic Procedure  Patient Name: Liana Keane  Patient MRN: [id number]  Patient YOB: 1970 Monday, April 8, 2024 Gurwinder Llanos MD  Dear patient,  As a result of recent federal legislation (The Federal Cures Act), you may   receive lab or pathology results from your procedure in your [facility]   account before your physician is able to contact you. Your physician or   their representative will relay the results to you with their   recommendations at their soonest availability.  Thank you,  RESTRICTIONS:  During your procedure today, you received medications for sedation.  These   medications may affect your judgment, balance and coordination.  Therefore,   for 24 hours, you have the following restrictions:   - DO NOT drive a car, operate machinery, make legal/financial decisions,   sign important papers or drink alcohol.    ACTIVITY:  Today: no heavy lifting, straining or running due to procedural   sedation/anesthesia.  The following day: return to full activity including work.  DIET:  Eat and drink normally unless instructed otherwise.     TREATMENT FOR COMMON SIDE EFFECTS:  - Mild abdominal pain, nausea, belching, bloating or excessive gas:  rest,   eat lightly and use a heating pad.  - Sore Throat: treat with throat lozenges and/or gargle with warm salt   water.  - Because air was used during the procedure, expelling large amounts of air   from your rectum or belching is normal.  - If a bowel prep was taken, you may not have a bowel movement for 1-3 days.    This is normal.  SYMPTOMS TO WATCH FOR AND REPORT TO YOUR PHYSICIAN:  1. Abdominal pain or bloating, other than gas cramps.  2. Chest pain.  3. Back pain.  4. Signs of infection such as: chills or fever occurring within 24 hours   after the procedure.  5. Rectal bleeding, which would show as bright red, maroon, or black stools.   (A tablespoon of blood from the rectum is not serious, especially if    hemorrhoids are present.)  6. Vomiting.  7. Weakness or dizziness.  GO DIRECTLY TO THE NEAREST EMERGENCY ROOM IF YOU HAVE ANY OF THE FOLLOWING:      Difficulty breathing              Chills and/or fever over 101 F   Persistent vomiting and/or vomiting blood   Severe abdominal pain   Severe chest pain   Black, tarry stools   Bleeding- more than one tablespoon   Any other symptom or condition that you feel may need urgent attention  Your doctor recommends these additional instructions:  If any biopsies were taken, your doctors clinic will contact you in 1 to 2   weeks with any results.  - Discharge patient to home.   - Resume previous diet.   - Continue present medications.   - Await pathology results.   - Repeat colonoscopy in 7-10 years for surveillance.   - Return to referring physician as previously scheduled.   - Patient has a contact number available for emergencies.  The signs and   symptoms of potential delayed complications were discussed with the   patient.  Return to normal activities tomorrow.  Written discharge   instructions were provided to the patient.  For questions, problems or results please call your physician Gurwinder Llanos MD at Work:  [phone]  If you have any questions about the above instructions, call the GI   department at [phone] or call the endoscopy unit at [phone]   from 7am until 3 pm.  [facility] [place], EMERGENCY ROOM PHONE NUMBER:   [phone]  IF A COMPLICATION OR EMERGENCY SITUATION ARISES AND YOU ARE UNABLE TO REACH   YOUR PHYSICIAN - GO DIRECTLY TO THE EMERGENCY ROOM.  I have read or have had read to me these discharge instructions for my   procedure and have received a written copy.  I understand these   instructions and will follow-up with my physician if I have any questions.     __________________________________       _____________________________________  Nurse Signature                                           Patient/Designated   Responsible Party Signature  MD Gurwinder Ibrahim MD  4/8/2024 9:56:12 AM  This report has been verified and signed electronically.  Dear patient,  As a result of recent federal legislation (The Federal Cures Act), you may   receive lab or pathology results from your procedure in your [facility]   account before your physician is able to contact you. Your physician or   their representative will relay the results to you with their   recommendations at their soonest availability.  Thank you,  PROVATION

## 2024-04-08 NOTE — TRANSFER OF CARE
"Anesthesia Transfer of Care Note    Patient: Liana Keane    Procedure(s) Performed: Procedure(s) (LRB):  COLONOSCOPY (N/A)    Patient location: PACU    Anesthesia Type: MAC    Transport from OR: Transported from OR on room air with adequate spontaneous ventilation    Post pain: adequate analgesia    Post assessment: no apparent anesthetic complications    Post vital signs: stable    Level of consciousness: responds to stimulation    Nausea/Vomiting: no nausea/vomiting    Complications: none    Transfer of care protocol was followed      Last vitals: Visit Vitals  Temp 36.9 °C (98.4 °F)   Ht 5' 4" (1.626 m)   Wt 66.7 kg (147 lb)   LMP 11/05/2021   SpO2 98%   Breastfeeding No   BMI 25.23 kg/m²     "

## 2024-04-08 NOTE — ANESTHESIA POSTPROCEDURE EVALUATION
Anesthesia Post Evaluation    Patient: Liana Keane    Procedure(s) Performed: Procedure(s) (LRB):  COLONOSCOPY (N/A)    Final Anesthesia Type: MAC      Patient location during evaluation: PACU  Patient participation: Yes- Able to Participate  Level of consciousness: awake and alert  Post-procedure vital signs: reviewed and stable  Pain management: adequate  Airway patency: patent    PONV status at discharge: No PONV  Anesthetic complications: no      Cardiovascular status: blood pressure returned to baseline  Respiratory status: unassisted and room air  Hydration status: euvolemic  Follow-up not needed.              Vitals Value Taken Time   /78 04/08/24 1004   Temp 36.8 °C (98.2 °F) 04/08/24 0954   Pulse 83 04/08/24 1004   Resp 16 04/08/24 1004   SpO2 100 % 04/08/24 1004         No case tracking events are documented in the log.      Pain/Janes Score: Janes Score: 10 (4/8/2024 10:04 AM)

## 2024-04-08 NOTE — ANESTHESIA PREPROCEDURE EVALUATION
04/08/2024  Liana Keane is a 53 y.o., female.      Pre-op Assessment    I have reviewed the Patient Summary Reports.     I have reviewed the Nursing Notes. I have reviewed the NPO Status.   I have reviewed the Medications.     Review of Systems  Anesthesia Hx:  No problems with previous Anesthesia             Denies Family Hx of Anesthesia complications.    Denies Personal Hx of Anesthesia complications.                    Social:  Non-Smoker       Hematology/Oncology:    Oncology Normal                                   EENT/Dental:  EENT/Dental Normal           Cardiovascular:  Exercise tolerance: good                                           Pulmonary:    Asthma mild                   Renal/:  Renal/ Normal                 Hepatic/GI:  Hepatic/GI Normal                 Musculoskeletal:  Musculoskeletal Normal                Neurological:  Neurology Normal                                      Endocrine:  Endocrine Normal            Dermatological:  Skin Normal    Psych:  Psychiatric History                Physical Exam  General: Well nourished, Cooperative, Alert and Oriented    Airway:  Mallampati: II   Mouth Opening: Normal  TM Distance: Normal  Tongue: Normal  Neck ROM: Normal ROM    Dental:  Intact    Chest/Lungs:  Clear to auscultation, Normal Respiratory Rate    Heart:  Rate: Normal  Rhythm: Regular Rhythm    Anesthesia Plan  Type of Anesthesia, risks & benefits discussed:    Anesthesia Type: MAC  Intra-op Monitoring Plan: Standard ASA Monitors  Induction:  IV  Informed Consent: Informed consent signed with the Patient and all parties understand the risks and agree with anesthesia plan.  All questions answered.   ASA Score: 2  Day of Surgery Review of History & Physical: H&P Update referred to the surgeon/provider.I have interviewed and examined the patient. I have reviewed the patient's  H&P dated: There are no significant changes.     Ready For Surgery From Anesthesia Perspective.   .

## 2024-04-09 VITALS
OXYGEN SATURATION: 100 % | TEMPERATURE: 98 F | BODY MASS INDEX: 25.1 KG/M2 | HEART RATE: 86 BPM | WEIGHT: 147 LBS | DIASTOLIC BLOOD PRESSURE: 86 MMHG | HEIGHT: 64 IN | SYSTOLIC BLOOD PRESSURE: 136 MMHG | RESPIRATION RATE: 18 BRPM

## 2024-04-09 LAB
FINAL PATHOLOGIC DIAGNOSIS: NORMAL
GROSS: NORMAL
Lab: NORMAL

## 2024-06-13 ENCOUNTER — OFFICE VISIT (OUTPATIENT)
Dept: INTERNAL MEDICINE | Facility: CLINIC | Age: 54
End: 2024-06-13
Payer: COMMERCIAL

## 2024-06-13 ENCOUNTER — HOSPITAL ENCOUNTER (OUTPATIENT)
Dept: RADIOLOGY | Facility: HOSPITAL | Age: 54
Discharge: HOME | End: 2024-06-13
Attending: FAMILY MEDICINE
Payer: COMMERCIAL

## 2024-06-13 VITALS
RESPIRATION RATE: 18 BRPM | HEIGHT: 64 IN | TEMPERATURE: 98 F | DIASTOLIC BLOOD PRESSURE: 80 MMHG | HEART RATE: 89 BPM | BODY MASS INDEX: 25.6 KG/M2 | WEIGHT: 149.94 LBS | OXYGEN SATURATION: 100 % | SYSTOLIC BLOOD PRESSURE: 132 MMHG

## 2024-06-13 DIAGNOSIS — M25.551 CHRONIC RIGHT HIP PAIN: ICD-10-CM

## 2024-06-13 DIAGNOSIS — F51.04 PSYCHOPHYSIOLOGIC INSOMNIA: Chronic | ICD-10-CM

## 2024-06-13 DIAGNOSIS — E78.2 MODERATE MIXED HYPERLIPIDEMIA NOT REQUIRING STATIN THERAPY: Chronic | ICD-10-CM

## 2024-06-13 DIAGNOSIS — R73.03 PREDIABETES: Chronic | ICD-10-CM

## 2024-06-13 DIAGNOSIS — G89.29 CHRONIC RIGHT HIP PAIN: ICD-10-CM

## 2024-06-13 DIAGNOSIS — G89.29 CHRONIC RIGHT HIP PAIN: Primary | ICD-10-CM

## 2024-06-13 DIAGNOSIS — E04.2 MULTIPLE THYROID NODULES: Chronic | ICD-10-CM

## 2024-06-13 DIAGNOSIS — M25.551 CHRONIC RIGHT HIP PAIN: Primary | ICD-10-CM

## 2024-06-13 LAB
CHOLEST SERPL-MCNC: 232 MG/DL (ref 120–199)
CHOLEST/HDLC SERPL: 2.6 {RATIO} (ref 2–5)
ESTIMATED AVG GLUCOSE: 117 MG/DL (ref 68–131)
HBA1C MFR BLD: 5.7 % (ref 4–5.6)
HDLC SERPL-MCNC: 90 MG/DL (ref 40–75)
HDLC SERPL: 38.8 % (ref 20–50)
LDLC SERPL CALC-MCNC: 130.6 MG/DL (ref 63–159)
NONHDLC SERPL-MCNC: 142 MG/DL
TRIGL SERPL-MCNC: 57 MG/DL (ref 30–150)

## 2024-06-13 PROCEDURE — 3079F DIAST BP 80-89 MM HG: CPT | Mod: CPTII,S$GLB,, | Performed by: FAMILY MEDICINE

## 2024-06-13 PROCEDURE — 73502 X-RAY EXAM HIP UNI 2-3 VIEWS: CPT | Mod: TC,RT

## 2024-06-13 PROCEDURE — 3044F HG A1C LEVEL LT 7.0%: CPT | Mod: CPTII,S$GLB,, | Performed by: FAMILY MEDICINE

## 2024-06-13 PROCEDURE — G2211 COMPLEX E/M VISIT ADD ON: HCPCS | Mod: S$GLB,,, | Performed by: FAMILY MEDICINE

## 2024-06-13 PROCEDURE — 99214 OFFICE O/P EST MOD 30 MIN: CPT | Mod: S$GLB,,, | Performed by: FAMILY MEDICINE

## 2024-06-13 PROCEDURE — 3008F BODY MASS INDEX DOCD: CPT | Mod: CPTII,S$GLB,, | Performed by: FAMILY MEDICINE

## 2024-06-13 PROCEDURE — 99999 PR PBB SHADOW E&M-EST. PATIENT-LVL V: CPT | Mod: PBBFAC,,, | Performed by: FAMILY MEDICINE

## 2024-06-13 PROCEDURE — 80061 LIPID PANEL: CPT | Performed by: FAMILY MEDICINE

## 2024-06-13 PROCEDURE — 83036 HEMOGLOBIN GLYCOSYLATED A1C: CPT | Performed by: FAMILY MEDICINE

## 2024-06-13 PROCEDURE — 3075F SYST BP GE 130 - 139MM HG: CPT | Mod: CPTII,S$GLB,, | Performed by: FAMILY MEDICINE

## 2024-06-13 PROCEDURE — 73502 X-RAY EXAM HIP UNI 2-3 VIEWS: CPT | Mod: 26,RT,, | Performed by: RADIOLOGY

## 2024-06-13 RX ORDER — MELOXICAM 15 MG/1
15 TABLET ORAL DAILY PRN
Qty: 30 TABLET | Refills: 1 | Status: SHIPPED | OUTPATIENT
Start: 2024-06-13

## 2024-06-13 RX ORDER — ZOLPIDEM TARTRATE 5 MG/1
5 TABLET ORAL NIGHTLY PRN
Qty: 90 TABLET | Refills: 1 | Status: SHIPPED | OUTPATIENT
Start: 2024-06-13

## 2024-06-13 NOTE — PROGRESS NOTES
"***DeepScribe<<<Awaiting Transcription>>>  Health Maintenance Due   Topic Date Due    Pneumococcal Vaccines (Age 0-64) (2 of 2 - PCV) 07/15/2022    COVID-19 Vaccine (5 - 2023-24 season) 09/01/2023    Lipid Panel  03/31/2024   ***  Future Appointments   Date Time Provider Department Center   7/9/2024  8:40 AM JEFF Simmons MD [facility]   [place]   ***  OFFICE VISIT 6/13/24 10:40 AM CDT    History of Present Illness            Review of Systems{LM ROS Otherwise Negative (Optional):88714::": Except as noted herein, ROS is otherwise negative. "}    Assessment & Plan            {There are no diagnoses linked to this encounter. (Refresh or delete this SmartLink)}No other significant complaints or concerns were reported.    {Chronic Disease Complex Visit?:93514}  Vitals:    06/13/24 1036   BP: 132/80   BP Location: Left arm   Patient Position: Sitting   BP Method: Large (Manual)   Pulse: 89   Resp: 18   Temp: 98.1 °F (36.7 °C)   SpO2: 100%   Weight: 68 kg (149 lb 14.6 oz)   Height: 5' 4" (1.626 m)   Physical Exam***  Physical Exam              This note was generated with the assistance of ambient listening technology. Verbal consent was obtained by the patient and accompanying visitor(s) for the recording of patient appointment to facilitate this note. I attest to having reviewed and edited the generated note for accuracy, though some syntax or spelling errors may persist. Please contact the author of this note for any clarification.    Documentation entered by me for this encounter may have been done in part using speech-recognition technology. Although I have made an effort to ensure accuracy, "sound like" errors may exist and should be interpreted in context.   " Thyroid ultrasound done in October was stable.   Assessed the patient's thyroid nodules and multinodular goiter as stable.   Plan to handle the patient's nontoxic multinodular goiter at the next annual wellness exam.  M25.551 PAIN IN RIGHT HIP:   The patient has chronic right hip and leg pain, worse with prolonged standing and sleeping on right side, but exam findings are inconsistent with greater trochanteric bursitis.   Referred the patient to an orthopedic sports medicine specialist for further evaluation and treatment of right hip/leg pain, which may include physical therapy and/or cortisone injection.   Prescribed meloxicam 1 tablet daily as needed for musculoskeletal pain control.   Advised the patient to take Tylenol 2 tablets in the morning and 2 tablets in the evening, and use meloxicam on top of that if needed.   The patient is waking up in middle of night with right leg pain and switching to left leg due to pain, which has been happening off and on for the last couple months.   Last week the patient worked standing up all day and the pain got very agitated, hurting every day.   Sleeping on that side hurts.   Prolonged standing makes the pain worse.   No specific injury recalled.   It is a chronic problem that is getting worse.   Examination showed good range of motion of hip with no significant pain elicited.   Assessed that it sounds like a pulled ligament but odd that sleeping on that side makes it hurt.   Initially thought bursitis but exam findings are inconsistent.  J45.20 MILD INTERMITTENT ASTHMA, UNCOMPLICATED:   Continued the patient on albuterol as needed.  Z91.010 ALLERGY TO PEANUTS:   Evaluated that the patient is allergic to adhesives and Neosporin.  Z79.899 OTHER LONG TERM (CURRENT) DRUG THERAPY:   Continued the patient on Ambien, seeing the patient every 6 months for this medication.   Scheduled follow up every 6 months for Ambien.   The patient reports Ambien is really helping with sleep  and they are able to function better.  E78.5 HYPERLIPIDEMIA, UNSPECIFIED:   Ordered A1C and cholesterol panel labs for the patient today.   Assessed to do cholesterol panel labs for the patient.  G47.00 INSOMNIA, UNSPECIFIED:   Continued the patient on Ambien, seeing the patient every 6 months for this medication.   Scheduled follow up every 6 months for Ambien.   The patient reports Ambien is really helping with sleep and they are able to function better.       1. Chronic right hip pain  -     X-Ray Hip 2 or 3 views Right with Pelvis when performed; Future; Expected date: 06/13/2024  -     Ambulatory referral/consult to Orthopedics; Future; Expected date: 06/20/2024  -     meloxicam (MOBIC) 15 MG tablet; Take 1 tablet (15 mg total) by mouth daily as needed (for musculoskeletal pain).  Dispense: 30 tablet; Refill: 1    2. Prediabetes  -     Hemoglobin A1C; Future; Expected date: 06/13/2024    3. Moderate mixed hyperlipidemia not requiring statin therapy  -     Lipid Panel; Future; Expected date: 06/13/2024    4. Multiple thyroid nodules  Overview:  US Soft Tissue Head Neck Thyroid  Narrative: EXAMINATION:  US SOFT TISSUE HEAD NECK THYROID    CLINICAL HISTORY:  Nontoxic multinodular goiter    TECHNIQUE:  Ultrasound of the thyroid and cervical lymph nodes was performed.    COMPARISON:  11/04/2014    FINDINGS:  The patient is status post right hemithyroidectomy.  Right thyroid fossa appears unremarkable.  The left lobe is large in size measuring 5.9 x 2.0 x 2.8 cm. Thyroid isthmus measures 3 mm AP.  Total thyroid volume measures approximately 16 mL.  There is a dominant solid isoechoic to hyperechoic nodule at the mid to lower pole of the left lobe which measures up to 3 cm and may be minimally increased in size from prior.  Additional solid more hypoechoic nodule noted at the upper to mid pole of the left lobe measures slightly larger than prior at 17 mm.  No suspicious microcalcifications visualized.  No  "lymphadenopathy is seen.  Impression: 1. Interval right hemithyroidectomy.  2. Recommend FNA of the 2 nodules in the left lobe as described above.  This report was flagged in Epic as abnormal.    Electronically signed by: Michael Villegas MD  Date:    04/12/2021  Time:    09:41       THYROID ULTRASOUND 11/04/14  Findings: The right lobe measures 5.2 x 2.7 x 3cm and contains a solitary dominant solid nodule measuring 4.6 x 2.5 x 3.1 cm.  The isthmus is 4.2 mm in AP diameter.  The left lobe measured 5.4 x 2 x 2.3 cm and contained 2 lower pole solid nodules.  The largest nodule in the left lobe measured 28 x 16 x 24mm lower pole in location.  IMPRESSION:  No significant change from previous in April.      5. Psychophysiologic insomnia  -     zolpidem (AMBIEN) 5 MG Tab; Take 1 tablet (5 mg total) by mouth nightly as needed (for insomnia).  Dispense: 90 tablet; Refill: 1    No other significant complaints or concerns were reported.    Today's visit involved the intricate management of episodic problem(s) and the ongoing care for the patient's serious or complex condition(s) listed above, reflecting the inherent complexity of providing longitudinal, comprehensive evaluation and management as the central hub for the patient's primary care services.  Vitals:    06/13/24 1036   BP: 132/80   BP Location: Left arm   Patient Position: Sitting   BP Method: Large (Manual)   Pulse: 89   Resp: 18   Temp: 98.1 °F (36.7 °C)   SpO2: 100%   Weight: 68 kg (149 lb 14.6 oz)   Height: 5' 4" (1.626 m)   Physical Exam  Vitals reviewed.   Constitutional:       General: She is not in acute distress.     Appearance: Normal appearance. She is not ill-appearing or diaphoretic.   Cardiovascular:      Rate and Rhythm: Normal rate and regular rhythm.   Pulmonary:      Effort: Pulmonary effort is normal.      Breath sounds: Normal breath sounds.   Musculoskeletal:         General: No swelling or tenderness.   Skin:     General: Skin is warm and dry. " "  Neurological:      Mental Status: She is alert and oriented to person, place, and time. Mental status is at baseline.   Psychiatric:         Mood and Affect: Mood normal.         Behavior: Behavior normal.         Judgment: Judgment normal.         This note was generated with the assistance of ambient listening technology. Verbal consent was obtained by the patient and accompanying visitor(s) for the recording of patient appointment to facilitate this note. I attest to having reviewed and edited the generated note for accuracy, though some syntax or spelling errors may persist. Please contact the author of this note for any clarification.    Documentation entered by me for this encounter may have been done in part using speech-recognition technology. Although I have made an effort to ensure accuracy, "sound like" errors may exist and should be interpreted in context.   "

## 2024-06-19 ENCOUNTER — TELEPHONE (OUTPATIENT)
Dept: PHYSICAL MEDICINE AND REHAB | Facility: CLINIC | Age: 54
End: 2024-06-19
Payer: COMMERCIAL

## 2024-06-19 ENCOUNTER — OFFICE VISIT (OUTPATIENT)
Dept: SPORTS MEDICINE | Facility: CLINIC | Age: 54
End: 2024-06-19
Payer: COMMERCIAL

## 2024-06-19 ENCOUNTER — HOSPITAL ENCOUNTER (OUTPATIENT)
Dept: RADIOLOGY | Facility: HOSPITAL | Age: 54
Discharge: HOME | End: 2024-06-19
Attending: STUDENT IN AN ORGANIZED HEALTH CARE EDUCATION/TRAINING PROGRAM
Payer: COMMERCIAL

## 2024-06-19 VITALS — BODY MASS INDEX: 25.6 KG/M2 | HEIGHT: 64 IN | WEIGHT: 149.94 LBS

## 2024-06-19 DIAGNOSIS — M25.551 CHRONIC RIGHT HIP PAIN: ICD-10-CM

## 2024-06-19 DIAGNOSIS — M79.2 NEUROPATHIC PAIN OF RIGHT THIGH: ICD-10-CM

## 2024-06-19 DIAGNOSIS — G89.29 CHRONIC RIGHT HIP PAIN: ICD-10-CM

## 2024-06-19 DIAGNOSIS — M79.2 NEUROPATHIC PAIN OF RIGHT THIGH: Primary | ICD-10-CM

## 2024-06-19 PROCEDURE — 73552 X-RAY EXAM OF FEMUR 2/>: CPT | Mod: 26,RT,, | Performed by: RADIOLOGY

## 2024-06-19 PROCEDURE — 3044F HG A1C LEVEL LT 7.0%: CPT | Mod: CPTII,S$GLB,, | Performed by: STUDENT IN AN ORGANIZED HEALTH CARE EDUCATION/TRAINING PROGRAM

## 2024-06-19 PROCEDURE — 1159F MED LIST DOCD IN RCRD: CPT | Mod: CPTII,S$GLB,, | Performed by: STUDENT IN AN ORGANIZED HEALTH CARE EDUCATION/TRAINING PROGRAM

## 2024-06-19 PROCEDURE — 73552 X-RAY EXAM OF FEMUR 2/>: CPT | Mod: TC,RT

## 2024-06-19 PROCEDURE — 99204 OFFICE O/P NEW MOD 45 MIN: CPT | Mod: S$GLB,,, | Performed by: STUDENT IN AN ORGANIZED HEALTH CARE EDUCATION/TRAINING PROGRAM

## 2024-06-19 PROCEDURE — 99999 PR PBB SHADOW E&M-EST. PATIENT-LVL III: CPT | Mod: PBBFAC,,, | Performed by: STUDENT IN AN ORGANIZED HEALTH CARE EDUCATION/TRAINING PROGRAM

## 2024-06-19 PROCEDURE — 3008F BODY MASS INDEX DOCD: CPT | Mod: CPTII,S$GLB,, | Performed by: STUDENT IN AN ORGANIZED HEALTH CARE EDUCATION/TRAINING PROGRAM

## 2024-06-19 NOTE — PROGRESS NOTES
Patient ID: Liana Keane  YOB: 1970  MRN: [id number]    Chief Complaint: Pain of the Right Hip    Referred By:  PCP for right hip    History of Present Illness: Liana Keane is a 53 y.o. female who presents today with right thigh pain. Liana Keane states it is Acute in nature and there was not a specific mechanism. One month of anterior thigh pain without inciting event or trauma.  Liana Keane describes the pain as a intermittent ache over thigh. Current pain level at rest is 0/10 (Numeric Pain Rating Scale).  Associated symptoms include: Swelling No, Instability No, Pain that affects your sleep Yes, Mechanical No, locking/catching No, Neurological No, limited range of motion No. Aggravating activities include sleep, lying on right side, moving at work. Predominantly present during sleep.  They have tried  OTC tylenol so far for this. They believe that they are unchanged with this treatment. They denies formal physical therapy for this. Previous pertinent orthopedic injuries include lumbar spine pain with no prior treatment.     Hemoglobin A1C   Date Value Ref Range Status   06/13/2024 5.7 (H) 4.0 - 5.6 % Final     Comment:     ADA Screening Guidelines:  5.7-6.4%  Consistent with prediabetes  >or=6.5%  Consistent with diabetes    High levels of fetal hemoglobin interfere with the HbA1C  assay. Heterozygous hemoglobin variants (HbS, HgC, etc)do  not significantly interfere with this assay.   However, presence of multiple variants may affect accuracy.     11/28/2023 5.8 (H) 4.0 - 5.6 % Final     Comment:     ADA Screening Guidelines:  5.7-6.4%  Consistent with prediabetes  >or=6.5%  Consistent with diabetes    High levels of fetal hemoglobin interfere with the HbA1C  assay. Heterozygous hemoglobin variants (HbS, HgC, etc)do  not significantly interfere with this assay.   However, presence of multiple variants may affect accuracy.       Occupation: [employer]    Past  Medical History:   Past Medical History:   Diagnosis Date    Acne     Anemia     Herpes simplex type II infection     History of COVID-19 (12/23/2020) 03/25/2021    History of partial thyroidectomy 03/25/2021    Mild intermittent asthma without complication     Multinodular goiter     Multiple thyroid nodules 03/25/2021    US Soft Tissue Head Neck Thyroid Narrative: EXAMINATION: US SOFT TISSUE HEAD NECK THYROID  CLINICAL HISTORY: Nontoxic multinodular goiter  TECHNIQUE: Ultrasound of the thyroid and cervical lymph nodes was performed.  COMPARISON: 11/04/2014  FINDINGS: The patient is status post right hemithyroidectomy.  Right thyroid fossa appears unremarkable.  The left lobe is large in size measuring 5.9 x 2.0 x     Prediabetes 12/03/2023    Seasonal allergic rhinitis due to pollen      Past Surgical History:   Procedure Laterality Date    COLONOSCOPY N/A 4/8/2024    Procedure: COLONOSCOPY;  Surgeon: Gurwinder Llanos MD;  Location: [facility];  Service: Endoscopy;  Laterality: N/A;    FNA thyroid nodule  04/2014    HERNIA REPAIR      umbilical     THYROIDECTOMY, PARTIAL  02/2015    complicated by post-op hematoma    uterine ablation  12/27/2017     Family History   Problem Relation Name Age of Onset    Hypertension Mother Ameena Hoang     Thyroid disease Mother Ameena Hoang     Hypertension Maternal Grandmother Vandana Hoang     Arthritis Maternal Grandmother Vandana Hoang     Cancer Other          Maternal great-grandmother    Diabetes Maternal Grandfather Marcelo     Asthma Sister      Allergic rhinitis Sister       Social History     Socioeconomic History    Marital status:    Occupational History    Occupation: [employer]     Employer: [employer]   Tobacco Use    Smoking status: Never    Smokeless tobacco: Never   Substance and Sexual Activity    Alcohol use: Not Currently     Comment: Occasionally  No alcohol 72 h prior to sx    Drug use: No    Sexual activity: Yes     Partners: Male     Birth  control/protection: Condom   Social History Narrative    Patient is  and has 2 children. She works as a .     Social Determinants of Health     Financial Resource Strain: Low Risk  (11/27/2023)    Overall Financial Resource Strain (CARDIA)     Difficulty of Paying Living Expenses: Not hard at all   Food Insecurity: No Food Insecurity (11/27/2023)    Hunger Vital Sign     Worried About Running Out of Food in the Last Year: Never true     Ran Out of Food in the Last Year: Never true   Transportation Needs: No Transportation Needs (11/27/2023)    PRAPARE - Transportation     Lack of Transportation (Medical): No     Lack of Transportation (Non-Medical): No   Physical Activity: Inactive (11/27/2023)    Exercise Vital Sign     Days of Exercise per Week: 0 days     Minutes of Exercise per Session: 0 min   Stress: Stress Concern Present (11/27/2023)    [place] [employer] - Occupational Stress Questionnaire     Feeling of Stress : To some extent   Housing Stability: Low Risk  (11/27/2023)    Housing Stability Vital Sign     Unable to Pay for Housing in the Last Year: No     Number of Places Lived in the Last Year: 1     Unstable Housing in the Last Year: No     Medication List with Changes/Refills   Current Medications    ALBUTEROL (PROAIR HFA) 90 MCG/ACTUATION INHALER    Inhale 2 puffs into the lungs every 6 (six) hours as needed for Wheezing or Shortness of Breath (or Cough).    BUSPIRONE (BUSPAR) 15 MG TABLET    Take 1 tablet (15 mg total) by mouth 2 (two) times daily.    CALCIUM-VITAMIN D (CALCIUM WITH VITAMIN D) 600 MG-10 MCG (400 UNIT) TAB    Take 1 tablet by mouth 2 (two) times a day. for 999 doses    CETIRIZINE (ZYRTEC) 10 MG TABLET    Take 1 tablet (10 mg total) by mouth once daily. Do not take cetirizine (Zyrtec) and fexofenadine (Allegra) on the same day.    FEXOFENADINE (ALLEGRA) 180 MG TABLET    Take 1 tablet (180 mg total) by mouth once daily. Do not take cetirizine  (Zyrtec) and fexofenadine (Allegra) on the same day.    FLUTICASONE PROPIONATE (FLONASE) 50 MCG/ACTUATION NASAL SPRAY    2 sprays (100 mcg total) by Each Nostril route once daily.    MELOXICAM (MOBIC) 15 MG TABLET    Take 1 tablet (15 mg total) by mouth daily as needed (for musculoskeletal pain).    MULTIVITAMIN-CA-IRON-MINERALS TAB    Take 1 tablet by mouth once daily.    ZOLPIDEM (AMBIEN) 5 MG TAB    Take 1 tablet (5 mg total) by mouth nightly as needed (for insomnia).     Review of patient's allergies indicates:   Allergen Reactions    Adhesive Rash     bandaids    Neosporin [neomycin-bacitracin-polymyxin] Rash       Physical Exam:   Body mass index is 25.73 kg/m².    GENERAL: Well appearing, in no acute distress.  HEAD: Normocephalic and atraumatic.  ENT: External ears and nose grossly normal.  EYES: EOMI bilaterally  PULMONARY: Respirations are grossly even and non-labored.  NEURO: Awake, alert, and oriented x 3.  SKIN: No obvious rashes appreciated.  PSYCH: Mood & affect are appropriate.    Detailed MSK exam:     R hip: flexion 130, ER 60, IR 25; all symmetrical with no pain  Tenderness to palpation anterior facet GT, posterior facet GT, glute min. Log roll negative. FADIR negative. JOHNY negative  Resisted hip flexion negative. Resisted hip abduction negative. Resisted hip ER negative.  Negative fulcrum test negative single leg hop    Imaging:  X-Ray Hip 2 or 3 views Right with Pelvis when performed  Narrative: EXAM: XR HIP WITH PELVIS WHEN PERFORMED 2 OR 3 VIEWS RIGHT    CLINICAL HISTORY: Right hip pain    FINDINGS:  No pelvic fracture.  No fracture dislocation or degenerative change of the right hip.  No evidence for avascular necrosis of the right femoral head.  Impression:  Normal pelvis and right hip x-ray.    Finalized on: 6/13/2024 7:48 PM By:  Leonardo Shook MD  [facility]# [id number]      2024-06-13 19:50:[id number]    [facility]      Relevant imaging results were reviewed and interpreted by me and per my read as above.   This was discussed with the patient and / or family today.     Assessment:  Liana Keane is a 53 y.o. female presents today for right anterior thigh pain mostly at night.  Has some signs of glute medius tendinopathy and some pain and weakness on that side with a positive Trendelenburg but most her pain is over the anterior thigh.  Negative Tinel's at the inguinal canal, does not appear to have any signs of meralgia paresthetica.  Does have a history of some chronic low back pain possible radicular in nature.  I discussed getting x-ray of the femur to rule out any bony changes deep to that consider nerve conduction study test if normal.  If all the tests are normal would consider possible diagnostic injection to the right lateral hip as she does have some symptoms there just very atypical they gave anterior thigh pain associated.  Left hip joint appears normal no gross bony abnormalities no signs of arthritis and good range of motion overall.    Neuropathic pain of right thigh  -     X-Ray Femur 2 View Right; Future; Expected date: 06/19/2024  -     EMG W/ ULTRASOUND AND NERVE CONDUCTION TEST 1 Extremity; Future    Chronic right hip pain  -     Ambulatory referral/consult to Orthopedics  -     EMG W/ ULTRASOUND AND NERVE CONDUCTION TEST 1 Extremity; Future         A copy of today's visit note has been sent to the referring provider.       Hector Barker MD    Disclaimer: This note was prepared using a voice recognition system and is likely to have sound alike errors within the text.

## 2024-07-09 ENCOUNTER — OFFICE VISIT (OUTPATIENT)
Dept: PHYSICAL MEDICINE AND REHAB | Facility: CLINIC | Age: 54
End: 2024-07-09
Payer: COMMERCIAL

## 2024-07-09 VITALS — BODY MASS INDEX: 25.6 KG/M2 | WEIGHT: 149.94 LBS | HEIGHT: 64 IN | RESPIRATION RATE: 13 BRPM

## 2024-07-09 DIAGNOSIS — M54.16 LUMBAR RADICULOPATHY: ICD-10-CM

## 2024-07-09 DIAGNOSIS — M79.2 NEUROPATHIC PAIN OF RIGHT THIGH: Primary | ICD-10-CM

## 2024-07-09 PROCEDURE — 99999 PR PBB SHADOW E&M-EST. PATIENT-LVL III: CPT | Mod: PBBFAC,,, | Performed by: PHYSICAL MEDICINE & REHABILITATION

## 2024-07-09 PROCEDURE — 95908 NRV CNDJ TST 3-4 STUDIES: CPT | Mod: S$GLB,,, | Performed by: PHYSICAL MEDICINE & REHABILITATION

## 2024-07-09 PROCEDURE — 99499 UNLISTED E&M SERVICE: CPT | Mod: S$GLB,,, | Performed by: PHYSICAL MEDICINE & REHABILITATION

## 2024-07-09 PROCEDURE — 95885 MUSC TST DONE W/NERV TST LIM: CPT | Mod: S$GLB,,, | Performed by: PHYSICAL MEDICINE & REHABILITATION

## 2024-07-09 NOTE — PROGRESS NOTES
[facility]   [id number]  [place] [address]  Phone: [phone]        Full Name: Liana Keane YOB: 1970  Patient ID: [id number]      Visit Date: 7/9/2024 13:53  Age: 53 Years 9 Months Old  Examining Physician: Elba Kim M.D.  Referring Physician:   Reason for Referral: lower extr        Chief Complaint   Patient presents with    Leg Pain       HPI: This is a 53 y.o.  female being seen in clinic today for evaluation of right leg/ant -lateral thigh pain that has bothered her over the past few weeks-months.  Her symptoms can appear more aggravating at night when trying to rest or after prolonged standing. Position change or rubbing provides some relief.  She denies major weakness but has some achy low back pain at times.    History obtained from patient    Past family, medical, social, and surgical history reviewed in chart    Review of Systems:     General- denies lethargy, weight change, fever, chills  Head/neck- denies swallowing difficulties  ENT- denies hearing changes  Cardiovascular-denies chest pain  Pulmonary- denies shortness of breath  GI- denies constipation or bowel incontinence  - denies bladder incontinence  Skin- denies wounds or rashes  Musculoskeletal- denies weakness, + pain  Neurologic- denies numbness and tingling  Psychiatric- denies depressive or psychotic features, + anxiety  Lymphatic-denies swelling  Endocrine- denies hypoglycemic symptoms/DM history  All other pertinent systems negative     Physical Examination:  General: Well developed, well nourished female, NAD  HEENT:NCAT EOMI bilaterally   Pulmonary:Normal respirations    Spinal Examination: CERVICAL  Active ROM is within normal limits.  Inspection: No deformity of spinal alignment.    Spinal Examination: LUMBAR or THORACIC  Active ROM is within normal limits.  Inspection: No deformity of spinal alignment.    Slr neg    Bilateral Upper and Lower Extremities:  Pulses are 2+ at radial  bilaterally.  Shoulder/Elbow/Wrist/Hand ROM   Hip/Knee/Ankle ROM wnl  Bilateral Extremities show normal capillary refill.  No signs of cyanosis, rubor, edema, skin changes, or dysvascular changes of appendages.  Nails appear intact.    Neurological Exam:  Cranial Nerves:  II-XII grossly intact    Manual Muscle Testing: (Motor 5=normal)  5/5 strength bilateral lower extremities except 4/5 hip add/abd    No focal atrophy is noted of either lower extremity.    Bilateral Reflexes:  No clonus at knee or ankle.    Sensation: tested to light touch  - intact in legs    Gait: Narrow base and good arm swing.      Entire procedure explained to patient prior to proceeding.  Verbal consent obtained    SNC      Nerve / Sites Rec. Site Onset Lat Peak Lat Amp Segments Distance Velocity     ms ms µV  mm m/s   R Sural - Ankle (Calf)      Calf Ankle 2.8 3.5 13.4 Calf - Ankle 140 51   R Superficial peroneal - Ankle      Lat leg Ankle 1.9 2.9 11.8 Lat leg - Ankle 140 73       MNC      Nerve / Sites Muscle Latency Amplitude Duration Rel Amp Segments Distance Lat Diff Velocity     ms mV ms %  mm ms m/s   R Peroneal - EDB      Ankle EDB 4.0 3.2 5.5 100 Ankle - EDB 80        Fibular head EDB 9.9 3.1 5.6 96.4 Fibular head - Ankle 330 5.9 56      Pop fossa EDB 11.1 3.4 5.6 109 Pop fossa - Fibular head 70 1.3 56   R Tibial - AH      Ankle AH 3.4 11.5 5.6 100 Ankle - Ankle 80        Pop fossa AH 12.1 10.1 6.5 87.8 Pop fossa - Ankle 445 8.7 51       EMG         EMG Summary Table     Spontaneous MUAP Recruitment   Muscle IA Fib PSW Fasc Other Dur. Dur Amp Dur Polys Pattern Effort   R. Rectus femoris N None None None . _NFT_ _NFT_ N N N N Max   R. Extensor digitorum brevis Incr 1+ None None . _NFT_ _NFT_ N Sl Incr 1+ sl red Max   R. Abductor hallucis N None None None . _NFT_ _NFT_ N N N N Max   L. Extensor digitorum brevis N None None None . _NFT_ _NFT_ N N N N Max       INTERPRETATION  -Right superficial peroneal sensory nerve conduction study  showed normal peak latency and amplitude  -Right sural sensory nerve conduction study showed normal peak latency and amplitude  -Right peroneal motor nerve conduction study showed normal latency, amplitude, and conduction velocity  -Right tibial motor nerve conduction study showed normal latency, amplitude, and conduction velocity  -Needle EMG examination performed to above mentioned muscles       IMPRESSION  ABNORMAL study  2. There is electrodiagnostic evidence of a mild subacute on chronic radiculopathy of the right L5 nerve root    PLAN  Discussed in detail for greater than 30 minutes about diagnosis and treatment plan    1. Follow up with referring provider: Dr. Hector Barker  2. Handouts on back exercise, radic provided. Rec referral to PT and Lspine imaging.  3. This study is good for one year. If symptoms worsen or do not improve, please re-consult.    Elba Kim M.D.  Physical Medicine and Rehab

## 2024-07-10 ENCOUNTER — TELEPHONE (OUTPATIENT)
Dept: PAIN MEDICINE | Facility: CLINIC | Age: 54
End: 2024-07-10
Payer: COMMERCIAL

## 2024-07-11 ENCOUNTER — TELEPHONE (OUTPATIENT)
Dept: PAIN MEDICINE | Facility: CLINIC | Age: 54
End: 2024-07-11
Payer: COMMERCIAL

## 2024-08-13 ENCOUNTER — OFFICE VISIT (OUTPATIENT)
Dept: PAIN MEDICINE | Facility: CLINIC | Age: 54
End: 2024-08-13
Payer: COMMERCIAL

## 2024-08-13 ENCOUNTER — HOSPITAL ENCOUNTER (OUTPATIENT)
Dept: RADIOLOGY | Facility: HOSPITAL | Age: 54
Discharge: HOME | End: 2024-08-13
Attending: ANESTHESIOLOGY
Payer: COMMERCIAL

## 2024-08-13 DIAGNOSIS — M54.16 LUMBAR RADICULOPATHY, CHRONIC: ICD-10-CM

## 2024-08-13 DIAGNOSIS — M54.16 LUMBAR RADICULOPATHY: ICD-10-CM

## 2024-08-13 DIAGNOSIS — M16.11 LOCALIZED OSTEOARTHROSIS OF RIGHT HIP: ICD-10-CM

## 2024-08-13 DIAGNOSIS — M54.16 LUMBAR RADICULOPATHY: Primary | ICD-10-CM

## 2024-08-13 DIAGNOSIS — M47.816 LUMBAR SPONDYLOSIS: ICD-10-CM

## 2024-08-13 DIAGNOSIS — M51.36 DDD (DEGENERATIVE DISC DISEASE), LUMBAR: ICD-10-CM

## 2024-08-13 DIAGNOSIS — M54.9 DORSALGIA, UNSPECIFIED: ICD-10-CM

## 2024-08-13 PROCEDURE — 99204 OFFICE O/P NEW MOD 45 MIN: CPT | Mod: S$GLB,,, | Performed by: ANESTHESIOLOGY

## 2024-08-13 PROCEDURE — 3044F HG A1C LEVEL LT 7.0%: CPT | Mod: CPTII,S$GLB,, | Performed by: ANESTHESIOLOGY

## 2024-08-13 PROCEDURE — 72114 X-RAY EXAM L-S SPINE BENDING: CPT | Mod: TC,PN

## 2024-08-13 PROCEDURE — 99999 PR PBB SHADOW E&M-EST. PATIENT-LVL IV: CPT | Mod: PBBFAC,,, | Performed by: ANESTHESIOLOGY

## 2024-08-13 PROCEDURE — 72114 X-RAY EXAM L-S SPINE BENDING: CPT | Mod: 26,,, | Performed by: RADIOLOGY

## 2024-08-13 PROCEDURE — 1159F MED LIST DOCD IN RCRD: CPT | Mod: CPTII,S$GLB,, | Performed by: ANESTHESIOLOGY

## 2024-08-13 RX ORDER — PREGABALIN 50 MG/1
50 CAPSULE ORAL NIGHTLY
Qty: 30 CAPSULE | Refills: 1 | Status: SHIPPED | OUTPATIENT
Start: 2024-08-13

## 2024-08-13 NOTE — PROGRESS NOTES
New Patient Interventional Pain Note (Initial Visit)    Referring Physician: Hector Barker MD    PCP: JEFF Simmons MD    Chief Complaint:     Chief Complaint   Patient presents with    Low-back Pain     Patient has pain in lower back that radiates in right hip/leg.  Pain level 2/10        SUBJECTIVE:    Liana Keane is a 53 y.o. female who presents to the clinic for the evaluation of lower back pain.   Patient reports 2 month history of lower back pain.  Patient denies any inciting traumas to her lower back pain.  Patient denies any previous surgeries on her lumbar spine or major joints.  Pain is described as a throbbing aching pain in the lower back in the right lower extremity.  Patient reports that most painful location is the right anterior thigh that is described as a burning pain.  Patient reports that lower back pain does seem to radiate to her right anterior thigh.  Patient reports occasional radiation to her left lower extremity, but reports that her right-sided pain is her primary pain.  Pain is worse when we up in the morning and lying on her right side, better with rest.  Pain is currently rated a 2/10, but can increase to an 8/10 with exacerbating activity.  Patient denies any fevers, chills, saddle anesthesia, or bowel and bladder incontinence.      Non-Pharmacologic Treatments:  Physical Therapy/Home Exercise: yes  Ice/Heat:yes  TENS: no  Acupuncture: no  Massage: yes  Chiropractic: no        Previous Pain Medications:  Tylenol, meloxicam, ibuprofen, Flexeril, topicals     report:  Reviewed and consistent with medication use as prescribed.    Pain Procedures:   none    Pain Disability Index Review:          No data to display                Imaging:       Results for orders placed during the hospital encounter of 06/13/24    X-Ray Hip 2 or 3 views Right with Pelvis when performed    Narrative  EXAM: XR HIP WITH PELVIS WHEN PERFORMED 2 OR 3 VIEWS RIGHT    CLINICAL HISTORY: Right hip  pain    FINDINGS:  No pelvic fracture.  No fracture dislocation or degenerative change of the right hip.  No evidence for avascular necrosis of the right femoral head.    Impression  Normal pelvis and right hip x-ray.    Finalized on: 6/13/2024 7:48 PM By:  Leonardo Shook MD  [facility]# [id number]      2024-06-13 19:50:[id number]    [facility]        Past Medical History:   Diagnosis Date    Acne     Anemia     Herpes simplex type II infection     History of COVID-19 (12/23/2020) 03/25/2021    History of partial thyroidectomy 03/25/2021    Mild intermittent asthma without complication     Multinodular goiter     Multiple thyroid nodules 03/25/2021    US Soft Tissue Head Neck Thyroid Narrative: EXAMINATION: US SOFT TISSUE HEAD NECK THYROID  CLINICAL HISTORY: Nontoxic multinodular goiter  TECHNIQUE: Ultrasound of the thyroid and cervical lymph nodes was performed.  COMPARISON: 11/04/2014  FINDINGS: The patient is status post right hemithyroidectomy.  Right thyroid fossa appears unremarkable.  The left lobe is large in size measuring 5.9 x 2.0 x     Prediabetes 12/03/2023    Seasonal allergic rhinitis due to pollen      Past Surgical History:   Procedure Laterality Date    COLONOSCOPY N/A 4/8/2024    Procedure: COLONOSCOPY;  Surgeon: Gurwinder Llanos MD;  Location: [facility];  Service: Endoscopy;  Laterality: N/A;    FNA thyroid nodule  04/2014    HERNIA REPAIR      umbilical     THYROIDECTOMY, PARTIAL  02/2015    complicated by post-op hematoma    uterine ablation  12/27/2017     Social History     Socioeconomic History    Marital status:    Occupational History    Occupation: [employer]     Employer: [employer]   Tobacco Use    Smoking status: Never    Smokeless tobacco: Never   Substance and Sexual Activity    Alcohol use: Not Currently     Comment: Occasionally  No alcohol 72 h prior to sx    Drug use: No    Sexual activity: Yes     Partners: Male     Birth control/protection: Condom   Social History Narrative     Patient is  and has 2 children. She works as a .     Social Determinants of Health     Financial Resource Strain: Low Risk  (11/27/2023)    Overall Financial Resource Strain (CARDIA)     Difficulty of Paying Living Expenses: Not hard at all   Food Insecurity: No Food Insecurity (11/27/2023)    Hunger Vital Sign     Worried About Running Out of Food in the Last Year: Never true     Ran Out of Food in the Last Year: Never true   Transportation Needs: No Transportation Needs (11/27/2023)    PRAPARE - Transportation     Lack of Transportation (Medical): No     Lack of Transportation (Non-Medical): No   Physical Activity: Inactive (11/27/2023)    Exercise Vital Sign     Days of Exercise per Week: 0 days     Minutes of Exercise per Session: 0 min   Stress: Stress Concern Present (11/27/2023)    [employer] [place] [employer] - Occupational Stress Questionnaire     Feeling of Stress : To some extent   Housing Stability: Low Risk  (11/27/2023)    Housing Stability Vital Sign     Unable to Pay for Housing in the Last Year: No     Number of Places Lived in the Last Year: 1     Unstable Housing in the Last Year: No     Family History   Problem Relation Name Age of Onset    Hypertension Mother Ameena West     Thyroid disease Mother [facility]     Hypertension Maternal Grandmother Vandana West     Arthritis Maternal Grandmother [facility]     Cancer Other          Maternal great-grandmother    Diabetes Maternal Grandfather Marcelo     Asthma Sister      Allergic rhinitis Sister         Review of patient's allergies indicates:   Allergen Reactions    Adhesive Rash     bandaids    Neosporin [neomycin-bacitracin-polymyxin] Rash       Current Outpatient Medications   Medication Sig    albuterol (PROAIR HFA) 90 mcg/actuation inhaler Inhale 2 puffs into the lungs every 6 (six) hours as needed for Wheezing or Shortness of Breath (or Cough).    busPIRone (BUSPAR) 15 MG tablet Take 1 tablet (15 mg total) by  mouth 2 (two) times daily.    calcium-vitamin D (CALCIUM WITH VITAMIN D) 600 mg-10 mcg (400 unit) Tab Take 1 tablet by mouth 2 (two) times a day. for 999 doses    cetirizine (ZYRTEC) 10 MG tablet Take 1 tablet (10 mg total) by mouth once daily. Do not take cetirizine (Zyrtec) and fexofenadine (Allegra) on the same day.    fexofenadine (ALLEGRA) 180 MG tablet Take 1 tablet (180 mg total) by mouth once daily. Do not take cetirizine (Zyrtec) and fexofenadine (Allegra) on the same day.    fluticasone propionate (FLONASE) 50 mcg/actuation nasal spray 2 sprays (100 mcg total) by Each Nostril route once daily.    meloxicam (MOBIC) 15 MG tablet Take 1 tablet (15 mg total) by mouth daily as needed (for musculoskeletal pain).    multivitamin-Ca-iron-minerals Tab Take 1 tablet by mouth once daily.    zolpidem (AMBIEN) 5 MG Tab Take 1 tablet (5 mg total) by mouth nightly as needed (for insomnia).    pregabalin (LYRICA) 50 MG capsule Take 1 capsule (50 mg total) by mouth every evening.     No current facility-administered medications for this visit.         ROS  Review of Systems   Constitutional:  Negative for chills, diaphoresis, fatigue and fever.   HENT:  Negative for ear discharge, ear pain, rhinorrhea, trouble swallowing and voice change.    Respiratory:  Negative for chest tightness, shortness of breath, wheezing and stridor.    Cardiovascular:  Negative for chest pain and leg swelling.   Gastrointestinal:  Negative for blood in stool, diarrhea, nausea and vomiting.   Endocrine: Negative for cold intolerance and heat intolerance.   Genitourinary:  Negative for dysuria, hematuria and urgency.   Musculoskeletal:  Positive for arthralgias, back pain, gait problem, joint swelling and myalgias. Negative for neck pain and neck stiffness.   Skin:  Negative for rash.   Neurological:  Positive for weakness and numbness. Negative for tremors, seizures, speech difficulty, light-headedness and headaches.   Hematological:  Does not  bruise/bleed easily.   Psychiatric/Behavioral:  Negative for agitation, confusion and suicidal ideas.             OBJECTIVE:  LMP 11/05/2021         Physical Exam  Constitutional:       General: She is not in acute distress.     Appearance: Normal appearance. She is not ill-appearing.   HENT:      Head: Normocephalic and atraumatic.      Nose: No congestion or rhinorrhea.   Eyes:      Extraocular Movements: Extraocular movements intact.      Pupils: Pupils are equal, round, and reactive to light.   Cardiovascular:      Pulses: Normal pulses.   Pulmonary:      Effort: Pulmonary effort is normal.   Musculoskeletal:      Right hip: Tenderness present. No deformity, lacerations, bony tenderness or crepitus. Decreased range of motion. Decreased strength.      Left hip: Normal.   Skin:     General: Skin is warm and dry.      Capillary Refill: Capillary refill takes less than 2 seconds.   Neurological:      General: No focal deficit present.      Mental Status: She is alert and oriented to person, place, and time.      Sensory: No sensory deficit.      Motor: Weakness present. No abnormal muscle tone.      Gait: Gait normal.      Deep Tendon Reflexes:      Reflex Scores:       Patellar reflexes are 1+ on the right side and 2+ on the left side.       Achilles reflexes are 1+ on the right side and 2+ on the left side.     Comments: 4/5 strength in right hip adduction in right knee extension   Psychiatric:         Mood and Affect: Mood normal.         Behavior: Behavior normal.         Thought Content: Thought content normal.           Musculoskeletal:        Lumbar Exam  Incision: no  Pain with Flexion: yes  Pain with Extension: yes  ROM:  Decreased  Paraspinous TTP:  Right-greater-than-left  Facet TTP:  L5-S1  Facet Loading:  Negative bilaterally  SLR:  Positive on the right at 80°  SIJ TTP:  Positive on the right  JOHNY:  Negative bilaterally      LABS:  Lab Results   Component Value Date    WBC 4.36 03/31/2023    HGB 12.9  03/31/2023    HCT 39.9 03/31/2023    MCV 93 03/31/2023     03/31/2023       CMP  Sodium   Date Value Ref Range Status   03/31/2023 140 136 - 145 mmol/L Final     Potassium   Date Value Ref Range Status   03/31/2023 4.1 3.5 - 5.1 mmol/L Final     Chloride   Date Value Ref Range Status   03/31/2023 104 95 - 110 mmol/L Final     CO2   Date Value Ref Range Status   03/31/2023 27 23 - 29 mmol/L Final     Glucose   Date Value Ref Range Status   03/31/2023 87 70 - 110 mg/dL Final     BUN   Date Value Ref Range Status   03/31/2023 9 6 - 20 mg/dL Final     Creatinine   Date Value Ref Range Status   03/31/2023 1.0 0.5 - 1.4 mg/dL Final     Calcium   Date Value Ref Range Status   03/31/2023 10.2 8.7 - 10.5 mg/dL Final     Total Protein   Date Value Ref Range Status   03/31/2023 8.0 6.0 - 8.4 g/dL Final     Albumin   Date Value Ref Range Status   03/31/2023 4.1 3.5 - 5.2 g/dL Final     Total Bilirubin   Date Value Ref Range Status   03/31/2023 0.5 0.1 - 1.0 mg/dL Final     Comment:     For infants and newborns, interpretation of results should be based  on gestational age, weight and in agreement with clinical  observations.    Premature Infant recommended reference ranges:  Up to 24 hours.............<8.0 mg/dL  Up to 48 hours............<12.0 mg/dL  3-5 days..................<15.0 mg/dL  6-29 days.................<15.0 mg/dL       Alkaline Phosphatase   Date Value Ref Range Status   03/31/2023 52 (L) 55 - 135 U/L Final     AST   Date Value Ref Range Status   03/31/2023 23 10 - 40 U/L Final     ALT   Date Value Ref Range Status   03/31/2023 25 10 - 44 U/L Final     Anion Gap   Date Value Ref Range Status   03/31/2023 9 8 - 16 mmol/L Final     eGFR if    Date Value Ref Range Status   08/14/2018 >60.0 >60 mL/min/1.73 m^2 Final     eGFR if non    Date Value Ref Range Status   08/14/2018 >60.0 >60 mL/min/1.73 m^2 Final     Comment:     Calculation used to obtain the estimated glomerular  filtration  rate (eGFR) is the CKD-EPI equation.          Lab Results   Component Value Date    HGBA1C 5.7 (H) 06/13/2024             ASSESSMENT:       53 y.o. year old female with lower back and right lower extremity pain, consistent with     1. Lumbar radiculopathy  Ambulatory referral/consult to [facility]    X-Ray Lumbar Complete Including Flex And Ext    MRI Lumbar Spine Without Contrast    Ambulatory referral/consult to Physical/Occupational Therapy    pregabalin (LYRICA) 50 MG capsule      2. DDD (degenerative disc disease), lumbar  pregabalin (LYRICA) 50 MG capsule      3. Lumbar spondylosis  pregabalin (LYRICA) 50 MG capsule      4. Localized osteoarthrosis of right hip        5. Dorsalgia, unspecified        6. Lumbar radiculopathy, chronic          Lumbar radiculopathy  -     Ambulatory referral/consult to [facility] Sp[facility] Clinic  -     X-Ray Lumbar Complete Including Flex And Ext; Future; Expected date: 08/13/2024  -     MRI Lumbar Spine Without Contrast; Future; Expected date: 08/13/2024  -     Ambulatory referral/consult to Physical/Occupational Therapy; Future; Expected date: 08/20/2024  -     pregabalin (LYRICA) 50 MG capsule; Take 1 capsule (50 mg total) by mouth every evening.  Dispense: 30 capsule; Refill: 1    DDD (degenerative disc disease), lumbar  -     pregabalin (LYRICA) 50 MG capsule; Take 1 capsule (50 mg total) by mouth every evening.  Dispense: 30 capsule; Refill: 1    Lumbar spondylosis  -     pregabalin (LYRICA) 50 MG capsule; Take 1 capsule (50 mg total) by mouth every evening.  Dispense: 30 capsule; Refill: 1    Localized osteoarthrosis of right hip    Dorsalgia, unspecified    Lumbar radiculopathy, chronic             PLAN:   - Interventions:   - pain appears to be most consistent with right lumbar radiculopathy with possible overlapping hip pathology and sacroiliac joint dysfunction.  We will obtain updated imaging to further evaluate etiology of this pain    - Anticoagulation  use:   No no anticoagulation    - Medications:  - start pregabalin 50 mg at night  - continue meloxicam 15 mg daily p.r.n.    - Therapy:   - patient has completed 6 weeks of physician lead therapy with the last 3 months with mild relief.  Continue home exercises  - refer patient to formal physical therapy for lower back and right lower extremity pain    - Imaging/Diagnostic:  - we will obtain updated x-rays of lumbar spine as well as MRI lumbar spine without contrast to further evaluate lower back pain with right lumbar radiculopathy that has continued despite over 8 weeks conservative therapy  - x-rays of right hip reveal no osseous abnormality  - EMG and nerve conduction study of bilateral lower extremities reveals mild subacute on chronic right L5 radiculopathy    - Consults:   - continue follow up with Orthopedics for  right hip pain    - Patient Questions: Answered all of the patient's questions regarding diagnosis, therapy, and treatment     This condition does not require this patient to take time off of work, and the primary goal of our Pain Management services is to improve the patient's functional capacity.     - Follow up visit: return to clinic after MRI        The above plan and management options were discussed at length with patient. Patient is in agreement with the above and verbalized understanding.    I discussed the goals of interventional chronic pain management with the patient on today's visit.  I explained the utility of injections for diagnostic and therapeutic purposes.  We discussed a multimodal approach to pain including treating the patient's given worst pain at any given time.  We will use a systematic approach to addressing pain.  We will also adopt a multimodal approach that includes injections, adjuvant medications, physical therapy, at times psychiatry.  There may be a limited role for opioid use intermittently in the treatment of pain, more particularly for acute pain although no  one approach can be used as a sole treatment modality.    I emphasized the importance of regular exercise, core strengthening and stretching, diet and weight loss as a cornerstone of long-term pain management.      Leander Song MD  Interventional Pain Management  [facility]        Disclaimer:  This note was prepared using voice recognition system and is likely to have sound alike errors that may have been overlooked even after proof reading.  Please call me with any questions      This includes face to face time and non-face to face time preparing to see the patient (eg, review of tests), obtaining and/or reviewing separately obtained history, documenting clinical information in the electronic or other health record, independently interpreting results and communicating results to the patient/family/caregiver, or care coordinator.    I spent a total of 45 minutes on the day of the visit.  This includes face to face time and non-face to face time preparing to see the patient (eg, review of tests), obtaining and/or reviewing separately obtained history, documenting clinical information in the electronic or other health record, independently interpreting results and communicating results to the patient/family/caregiver, or care coordinator.

## 2024-08-14 ENCOUNTER — CLINICAL SUPPORT (OUTPATIENT)
Dept: REHABILITATION | Facility: HOSPITAL | Age: 54
End: 2024-08-14
Attending: ANESTHESIOLOGY
Payer: COMMERCIAL

## 2024-08-14 DIAGNOSIS — M62.81 WEAKNESS OF TRUNK MUSCULATURE: Primary | ICD-10-CM

## 2024-08-14 DIAGNOSIS — M54.16 LUMBAR RADICULOPATHY: ICD-10-CM

## 2024-08-14 PROCEDURE — 97530 THERAPEUTIC ACTIVITIES: CPT | Mod: PN

## 2024-08-14 PROCEDURE — 97161 PT EVAL LOW COMPLEX 20 MIN: CPT | Mod: PN

## 2024-08-14 PROCEDURE — 97110 THERAPEUTIC EXERCISES: CPT | Mod: PN

## 2024-08-14 NOTE — PLAN OF CARE
"[facility]   Physical Therapy Initial Evaluation      Name: Liana Keane  Clinic Number: [id number]    Therapy Diagnosis:   Encounter Diagnoses   Name Primary?    Lumbar radiculopathy     Weakness of trunk musculature Yes        Physician: Leander Song MD    Physician Orders: PT Eval and Treat   Medical Diagnosis from Referral: Lumbar radiculopathy [M54.16]   Evaluation Date: 8/14/2024  Authorization Period Expiration: 12/31/2024  Plan of Care Expiration: 9/25/2024  Progress Note Due: 9/13/2024  Visit # / Visits authorized: 1/ 1   FOTO: 1/3    Precautions: Standard     Time In: 7:30 am   Time Out: 8:20 am  Total Appointment Time (timed & untimed codes): 50 minutes    Subjective     Date of onset: about a month ago     History of current condition - Liana reports: midline low back and R anterior thigh pain. Pt states pain started with insidious onset in the middle of the night waking her up in the R lower extremity. Pt states this has only progressed over the past month causing her to seek further care. Patient notes trouble with sleeping, as pain is worst at night. Pt reports some tingling in her foot with prolonged sitting, but denies any other symptoms of numbness/ tingling. Pt denies any history of similar pains. MRI is reportedly planned for Monday.     Imaging: Lumbar x-rays: "No evidence of acute fracture or dislocation. Bony mineralization is normal. Soft tissues are unremarkable. Mild spondylosis throughout the visualized thoracolumbar spine. Mild facet arthropathy lower lumbar spine. Normal alignment on flexion extension views."    Social History: lives with their daughter in single story home   Occupation: banker at [employer]   Prior Level of Function: Independent and pain free with all activities of daily living   Current Level of Function: Independent though pain limited with walking and and functional mobility, requiring modification and rest breaks needed to complete " activities of daily living     Pain:  Current 0/10, worst 10/10   Location: low back and R lower extremity   Description: Aching  Aggravating Factors: walking, cleaning home, prolonged standing  Easing Factors: heat, OTC medication     Patients goals: to decrease pain presence      Medical History:   Past Medical History:   Diagnosis Date    Acne     Anemia     Herpes simplex type II infection     History of COVID-19 (12/23/2020) 03/25/2021    History of partial thyroidectomy 03/25/2021    Mild intermittent asthma without complication     Multinodular goiter     Multiple thyroid nodules 03/25/2021    US Soft Tissue Head Neck Thyroid Narrative: EXAMINATION: US SOFT TISSUE HEAD NECK THYROID  CLINICAL HISTORY: Nontoxic multinodular goiter  TECHNIQUE: Ultrasound of the thyroid and cervical lymph nodes was performed.  COMPARISON: 11/04/2014  FINDINGS: The patient is status post right hemithyroidectomy.  Right thyroid fossa appears unremarkable.  The left lobe is large in size measuring 5.9 x 2.0 x     Prediabetes 12/03/2023    Seasonal allergic rhinitis due to pollen        Surgical History:   Liana Keane  has a past surgical history that includes FNA thyroid nodule (04/2014); Thyroidectomy, partial (02/2015); Hernia repair; uterine ablation (12/27/2017); and Colonoscopy (N/A, 4/8/2024).    Medications:   Liana has a current medication list which includes the following prescription(s): albuterol, buspirone, calcium-vitamin d, cetirizine, fexofenadine, fluticasone propionate, meloxicam, multivitamin-ca-iron-minerals, pregabalin, and zolpidem.    Allergies:   Review of patient's allergies indicates:   Allergen Reactions    Adhesive Rash     bandaids    Neosporin [neomycin-bacitracin-polymyxin] Rash        Objective      RANGE OF MOTION:    Lumbar AROM   (% of norm ROM present)  Right  (spine) Left   Pain/Dysfunction with Movement   Lumbar Flexion  100% --- ---   Lumbar Extension  100% --- ---    Lumbar Side Bending   100% 100% ---   Lumbar Rotation 100% 100% Minor discomfort      STRENGTH:    L/E MMT Right Left Goal   Hip Flexion  4/5 4/5 5/5 B   Hip Extension  4-/5 4-/5 4+/5 B   Hip Abduction  4-/5 4-/5 4+/5 B   Knee Extension 4+/5 4+/5 ---   Knee Flexion 4+/5 4/5 5/5 B   Hip IR 5/5 5/5 ---   Hip ER 4/5 4/5 5/5 B   *Pt denies any pain reproduction with MMT    JOINT MOBILITY:     Joint Motion Tested Right  (spine)   Lumbar   WNL though painful at all levels     SPECIAL TESTS:     Right  (spine) Left    SLR  Negative Negative     Palpation: Increased tone and tenderness noted with palpation of lumbar spinous processes, lumbar paraspinals, and glutes     Posture:  Pt presents with postural abnormalities which include: rounded shoulders  and anterior pelvic tilt (R>L)      Gait Analysis: The patient ambulated without noteworthy abnormality.       Movement Analysis Observations noted   Plank on elbows 8 seconds held before fatigue and min pain onset causes drop        FOTO Lumbar Survey    Therapist reviewed FOTO scores for Liana Keane on 8/14/2024.   FOTO documents entered into [employer] - see Media section.    Score: 62         Treatment     Total Treatment time (time-based codes) separate from Evaluation: 24 minutes     Liana received the treatments listed below:      therapeutic exercises to develop strength, endurance, ROM, and flexibility for 8 minutes including:    Open Books   R SB trunk stretch     neuromuscular re-education activities to improve: Balance, Proprioception, and Posture for 4 minutes. The following activities were included:    Abdominal bracing with marching     therapeutic activities to improve functional performance for 12  minutes, including:    HEP and plan of care education     Patient Education and Home Exercises     Education provided:   - justification and explanation of HEP and treatment   - plan of care   - anatomical and biomechanical mechanisms in involved regions     Written Home Exercises  Provided: yes. Exercises were reviewed and Liana was able to demonstrate them prior to the end of the session.  Liana demonstrated good  understanding of the education provided. See EMR under Patient Instructions for exercises provided during therapy sessions.    Assessment     Liana is a 53 y.o. female referred to outpatient Physical Therapy with a medical diagnosis of lumbar radiculopathy. Patient presents with deficits in strength, core endurance/ mobility, posture and function. Patient is currently limited with her tolerance of postures for work and mobility for household chores due to pain and deficits.     Patient prognosis is Good.   Patient will benefit from skilled outpatient Physical Therapy to address the deficits stated above and in the chart below, provide patient /family education, and to maximize patientt's level of independence.     Plan of care discussed with patient: Yes  Patient's spiritual, cultural and educational needs considered and patient is agreeable to the plan of care and goals as stated below:     Anticipated Barriers for therapy: none    Medical Necessity is demonstrated by the following  History  Co-morbidities and personal factors that may impact the plan of care [x] LOW: no personal factors / co-morbidities  [] MODERATE: 1-2 personal factors / co-morbidities  [] HIGH: 3+ personal factors / co-morbidities    Moderate / High Support Documentation: n/a     Examination  Body Structures and Functions, activity limitations and participation restrictions that may impact the plan of care [x] LOW: addressing 1-2 elements  [] MODERATE: 3+ elements  [] HIGH: 4+ elements (please support below)    Moderate / High Support Documentation:   [] Head / Neck  [] Spine  [] Upper Quarter  [] Lower Quarter  [] Range of motion Deficits  [] Gross Symmetry Deficits  [] Strength Deficits  [] Balance Deficits  [] Gait Deficits  [] Unable to participate in daily activities  [] Unable to perform functional  tasks  [] Unable to Care for Self or others  [] Community/ Social Life changes due to impairments     Clinical Presentation [x] LOW: stable  [] MODERATE: Evolving  [] HIGH: Unstable     Decision Making/ Complexity Score: low       Goals:    Short Term Goals:  3 weeks Progress   8/14/2024   Pain: Pt will demonstrate improved pain by reports of less than or equal to 5/10 worst pain on the verbal rating scale in order to progress toward maximal functional ability and improve QOL. PC   HEP: Patient will demonstrate independence with HEP in order to progress toward functional independence. PC     Long Term Goals:  6 weeks Progress  8/14/2024   Pain: Pt will demonstrate improved pain by reports of less than or equal to 2/10 worst pain on the verbal rating scale in order to progress toward maximal functional ability and improve QOL.   PC   Function: Patient will demonstrate improved function as indicated by a functional score of greater than or equal to 80 out of 100 on FOTO. PC   Postural Endurance: Patient will demonstrate improved core stability and trunk muscle endurance with performance of plank on elbows for 30 seconds without pain or significant shaking for better postural tolerance at work. PC   Strength: Patient will improve strength to stated goals, listed in objective measures above, in order to improve functional independence and quality of life. PC   GOALS KEY:   PC= progressing/continue; PM= partially met;        DC= discontinue  Plan     Plan of care Certification: 8/14/2024 to 9/25/2024.    Outpatient Physical Therapy 2 times weekly for 6 weeks to include the following interventions: Cervical/Lumbar Traction, Electrical Stimulation with or without FDN, Gait Training, Manual Therapy, Moist Heat/ Ice, Neuromuscular Re-ed, Orthotic Management and Training, Patient Education, Self Care, Therapeutic Activities, Therapeutic Exercise, and Dry Needling .     Adriana Zapien, PT

## 2024-08-16 ENCOUNTER — CLINICAL SUPPORT (OUTPATIENT)
Dept: REHABILITATION | Facility: HOSPITAL | Age: 54
End: 2024-08-16
Payer: COMMERCIAL

## 2024-08-16 DIAGNOSIS — M62.81 WEAKNESS OF TRUNK MUSCULATURE: Primary | ICD-10-CM

## 2024-08-16 PROCEDURE — 97110 THERAPEUTIC EXERCISES: CPT | Mod: PN

## 2024-08-16 PROCEDURE — 97112 NEUROMUSCULAR REEDUCATION: CPT | Mod: PN

## 2024-08-18 NOTE — PROGRESS NOTES
"[facility]   Physical Therapy Treatment Note      Name: Liana Keane  Clinic Number: [id number]    Therapy Diagnosis:   Encounter Diagnosis   Name Primary?    Weakness of trunk musculature Yes     Physician: Leander Song MD    Visit Date: 8/16/2024    Physician Orders: PT Eval and Treat   Medical Diagnosis from Referral: Lumbar radiculopathy [M54.16]   Evaluation Date: 8/14/2024  Authorization Period Expiration: 12/31/2024  Plan of Care Expiration: 9/25/2024  Progress Note Due: 9/13/2024  Visit # / Visits authorized: 1/ 12   FOTO: 1/3     Precautions: Standard     PTA Visit #: -/5     Time In: 3:00 pm  Time Out: 3:55 pm   Total Billable Time: 55 minutes    Subjective     Pt reports: she has been feeling okay.  She was compliant with home exercise program.  Response to previous treatment: n/a today  Functional change: n/a today    Pain: 3/10  Location: low back and R thigh     Objective      Objective Measures updated at progress report unless specified.     Treatment     Liana received the following treatments:     CPT Intervention  JointFocus Duration / Intensity  8/16/2024   TE Nustep 6 minutes    TE Seated R side bend stretch   X 10 5" holds  with swiss ball   TE Open Book  X 10 B    NMR PPT + Supine march  2 x 10 3" holds    NMR TA activation with ball press  3 x 10 5" holds   NMR SL clamshells  YTB x10 10" holds    NMR Bridges  2 x 10    TE   LTR X 10 B            PLAN             CPT Codes available for Billing:   (-) minutes of Manual therapy (MT) to improve pain and ROM.  (15) minutes of Therapeutic Exercise (TE) to develop strength, endurance, range of motion, and flexibility.  (40) minutes of Neuromuscular Re-Education (NMR)  to improve: Balance, Coordination, Kinesthetic, Sense, Proprioception, and Posture.  (-) minutes of Therapeutic Activities (TA) to improve functional performance.  (-) minutes of Gait Training (GT) to improve functional mobility and " safety  Unattended Electrical Stimulation (ES) for muscle performance or pain modulation.  Vasopneumatic Device Therapy () for management of swelling/edema. (22210)  BFR: Blood flow restriction applied during exercise  NP or (-): Not Performed     Patient Education and Home Exercises       Education provided:   - education and justification of treatments performed     Written Home Exercises Provided: Patient instructed to cont prior HEP. Exercises were reviewed and Liana was able to demonstrate them prior to the end of the session.  Liana demonstrated good  understanding of the education provided. See EMR under Patient Instructions for exercises provided during therapy sessions    Assessment   Pt tolerates treatment well with lower pain levels presenting. Treatment was done focusing on neuromotor control development and mobility. Pt tolerates this well without any increased pain. Cues were needed for technique and to facilitate proper muscle activation. Will continue to progress per tolerance.    Liana Is progressing well towards her goals.   Pt prognosis is Good.     Pt will continue to benefit from skilled outpatient physical therapy to address the deficits listed in the problem list box on initial evaluation, provide pt/family education and to maximize pt's level of independence in the home and community environment.     Pt's spiritual, cultural and educational needs considered and pt agreeable to plan of care and goals.     Anticipated barriers to physical therapy: none    Goals:   Short Term Goals:  3 weeks Progress   8/14/2024   Pain: Pt will demonstrate improved pain by reports of less than or equal to 5/10 worst pain on the verbal rating scale in order to progress toward maximal functional ability and improve QOL. PC   HEP: Patient will demonstrate independence with HEP in order to progress toward functional independence. PC      Long Term Goals:  6 weeks Progress  8/14/2024   Pain: Pt will demonstrate  improved pain by reports of less than or equal to 2/10 worst pain on the verbal rating scale in order to progress toward maximal functional ability and improve QOL.   PC   Function: Patient will demonstrate improved function as indicated by a functional score of greater than or equal to 80 out of 100 on FOTO. PC   Postural Endurance: Patient will demonstrate improved core stability and trunk muscle endurance with performance of plank on elbows for 30 seconds without pain or significant shaking for better postural tolerance at work. PC   Strength: Patient will improve strength to stated goals, listed in objective measures above, in order to improve functional independence and quality of life. PC   GOALS KEY:   PC= progressing/continue;   PM= partially met;             DC= discontinue    Plan     Plan of care Certification: 8/14/2024 to 9/25/2024.     Outpatient Physical Therapy 2 times weekly for 6 weeks to include the following interventions: Cervical/Lumbar Traction, Electrical Stimulation with or without FDN, Gait Training, Manual Therapy, Moist Heat/ Ice, Neuromuscular Re-ed, Orthotic Management and Training, Patient Education, Self Care, Therapeutic Activities, Therapeutic Exercise, and Dry Needling .     Adriana Zapien, PT

## 2024-08-19 ENCOUNTER — HOSPITAL ENCOUNTER (OUTPATIENT)
Dept: RADIOLOGY | Facility: HOSPITAL | Age: 54
Discharge: HOME | End: 2024-08-19
Attending: ANESTHESIOLOGY
Payer: COMMERCIAL

## 2024-08-19 DIAGNOSIS — M54.16 LUMBAR RADICULOPATHY: ICD-10-CM

## 2024-08-19 PROCEDURE — 72148 MRI LUMBAR SPINE W/O DYE: CPT | Mod: 26,,, | Performed by: RADIOLOGY

## 2024-08-19 PROCEDURE — 72148 MRI LUMBAR SPINE W/O DYE: CPT | Mod: TC

## 2024-08-21 ENCOUNTER — CLINICAL SUPPORT (OUTPATIENT)
Dept: REHABILITATION | Facility: HOSPITAL | Age: 54
End: 2024-08-21
Payer: COMMERCIAL

## 2024-08-21 DIAGNOSIS — M62.81 WEAKNESS OF TRUNK MUSCULATURE: Primary | ICD-10-CM

## 2024-08-21 PROCEDURE — 97110 THERAPEUTIC EXERCISES: CPT | Mod: PN

## 2024-08-21 PROCEDURE — 97112 NEUROMUSCULAR REEDUCATION: CPT | Mod: PN

## 2024-08-21 NOTE — PROGRESS NOTES
"[facility]   Physical Therapy Treatment Note      Name: Liana Keane  Clinic Number: [id number]    Therapy Diagnosis:   Encounter Diagnosis   Name Primary?    Weakness of trunk musculature Yes     Physician: Leander Song MD    Visit Date: 8/21/2024    Physician Orders: PT Eval and Treat   Medical Diagnosis from Referral: Lumbar radiculopathy [M54.16]   Evaluation Date: 8/14/2024  Authorization Period Expiration: 12/31/2024  Plan of Care Expiration: 9/25/2024  Progress Note Due: 9/13/2024  Visit # / Visits authorized: 2/ 12   FOTO: 1/3     Precautions: Standard     PTA Visit #: -/5     Time In: 8:30 am   Time Out: 9:27 am   Total Billable Time: 57 minutes    Subjective     Pt reports: minor pain in the R thigh.   She was compliant with home exercise program.  Response to previous treatment: no soreness, felt good  Functional change: n/a today    Pain: 2/10  Location: low back and R thigh     Objective      Objective Measures updated at progress report unless specified.     Treatment     Liana received the following treatments:     CPT Intervention  JointFocus Duration / Intensity  8/21/2024 Duration / Intensity  8/16/2024   TE Nustep - 6 minutes    TE Figure four QL stretch B x 5      TE Open Book  X 6 B  X 10 B    NMR PPT + Supine march  2 x 10 3" holds  2 x 10 3" holds    NMR TA activation with ball press  +Mod dead bug 2 x 10  3 x 10 5" holds   NMR SL clamshells  RTB 2 x 10 5"  YTB x10 10" holds    NMR Bridges  2x 10  2 x 10    TE   LTR X 10 B  X 10 B    NMR Bird Dog X 10 B     NMR Quadruped cat/cow X 10 B      NMR Raised quadruped holds  X 3 10" holds     NMR  Mod plank rocking to prayer stretch X 5     PLAN               CPT Codes available for Billing:   (-) minutes of Manual therapy (MT) to improve pain and ROM.  (17) minutes of Therapeutic Exercise (TE) to develop strength, endurance, range of motion, and flexibility.  (40) minutes of Neuromuscular Re-Education (NMR)  to " improve: Balance, Coordination, Kinesthetic, Sense, Proprioception, and Posture.  (-) minutes of Therapeutic Activities (TA) to improve functional performance.  (-) minutes of Gait Training (GT) to improve functional mobility and safety  Unattended Electrical Stimulation (ES) for muscle performance or pain modulation.  Vasopneumatic Device Therapy () for management of swelling/edema. (36395)  BFR: Blood flow restriction applied during exercise  NP or (-): Not Performed     Patient Education and Home Exercises       Education provided:   - education and justification of treatments performed     Written Home Exercises Provided: Patient instructed to cont prior HEP. Exercises were reviewed and Liana was able to demonstrate them prior to the end of the session.  Liana demonstrated good  understanding of the education provided. See EMR under Patient Instructions for exercises provided during therapy sessions    Assessment   Pt tolerates treatment well with lower pain levels presenting. Treatment was done focusing on neuromotor control development and mobility with progressions to further postural training/core control. Pt tolerates this well noting no pain by midway through session. Cues were needed for technique and to facilitate proper muscle activation still. Will continue to progress per tolerance.    Liana Is progressing well towards her goals.   Pt prognosis is Good.     Pt will continue to benefit from skilled outpatient physical therapy to address the deficits listed in the problem list box on initial evaluation, provide pt/family education and to maximize pt's level of independence in the home and community environment.     Pt's spiritual, cultural and educational needs considered and pt agreeable to plan of care and goals.     Anticipated barriers to physical therapy: none    Goals:   Short Term Goals:  3 weeks Progress   8/14/2024   Pain: Pt will demonstrate improved pain by reports of less than or equal to  5/10 worst pain on the verbal rating scale in order to progress toward maximal functional ability and improve QOL. PC   HEP: Patient will demonstrate independence with HEP in order to progress toward functional independence. PC      Long Term Goals:  6 weeks Progress  8/14/2024   Pain: Pt will demonstrate improved pain by reports of less than or equal to 2/10 worst pain on the verbal rating scale in order to progress toward maximal functional ability and improve QOL.   PC   Function: Patient will demonstrate improved function as indicated by a functional score of greater than or equal to 80 out of 100 on FOTO. PC   Postural Endurance: Patient will demonstrate improved core stability and trunk muscle endurance with performance of plank on elbows for 30 seconds without pain or significant shaking for better postural tolerance at work. PC   Strength: Patient will improve strength to stated goals, listed in objective measures above, in order to improve functional independence and quality of life. PC   GOALS KEY:   PC= progressing/continue;   PM= partially met;             DC= discontinue    Plan     Plan of care Certification: 8/14/2024 to 9/25/2024.     Outpatient Physical Therapy 2 times weekly for 6 weeks to include the following interventions: Cervical/Lumbar Traction, Electrical Stimulation with or without FDN, Gait Training, Manual Therapy, Moist Heat/ Ice, Neuromuscular Re-ed, Orthotic Management and Training, Patient Education, Self Care, Therapeutic Activities, Therapeutic Exercise, and Dry Needling .     Adriana Zapien, PT

## 2024-08-23 ENCOUNTER — CLINICAL SUPPORT (OUTPATIENT)
Dept: REHABILITATION | Facility: HOSPITAL | Age: 54
End: 2024-08-23
Payer: COMMERCIAL

## 2024-08-23 DIAGNOSIS — M62.81 WEAKNESS OF TRUNK MUSCULATURE: Primary | ICD-10-CM

## 2024-08-23 PROCEDURE — 97110 THERAPEUTIC EXERCISES: CPT | Mod: PN

## 2024-08-23 PROCEDURE — 97112 NEUROMUSCULAR REEDUCATION: CPT | Mod: PN

## 2024-08-23 NOTE — PROGRESS NOTES
"[facility]   Physical Therapy Treatment Note      Name: Liana Keane  Clinic Number: [id number]    Therapy Diagnosis:   Encounter Diagnosis   Name Primary?    Weakness of trunk musculature Yes     Physician: Leander Song MD    Visit Date: 8/23/2024    Physician Orders: PT Eval and Treat   Medical Diagnosis from Referral: Lumbar radiculopathy [M54.16]   Evaluation Date: 8/14/2024  Authorization Period Expiration: 12/31/2024  Plan of Care Expiration: 9/25/2024  Progress Note Due: 9/13/2024  Visit # / Visits authorized: 3/ 12   FOTO: 1/3     Precautions: Standard     PTA Visit #: -/5     Time In: 8:30 am   Time Out: 9:30 am   Total Billable Time: 60 minutes    Subjective     Pt reports: pain is more localized at the low back today with a mild aching.   She was compliant with home exercise program.  Response to previous treatment: no soreness, felt good  Functional change: n/a today    Pain: 2/10  Location: low back    Objective      Objective Measures updated at progress report unless specified.     Treatment     Liana received the following treatments:      CPT Intervention  JointFocus Duration / Intensity  8/23/2024 Duration / Intensity  8/21/2024 Duration / Intensity  8/16/2024   TE Nustep - - 6 minutes    TE Figure four QL stretch X5 B B x 5      TE Open Book  - X 6 B  X 10 B    NMR PPT + Supine march  2 x 10 3" holds 2 x 10 3" holds  2 x 10 3" holds    NMR TA activation with ball press  Mod dead bug  2 x 10  +Mod dead bug 2 x 10  3 x 10 5" holds   NMR SL clamshells  RTB 2 x 10 5" holds RTB 2 x 10 5"  YTB x10 10" holds    NMR Bridges  3 x 10  2x 10  2 x 10    TE   LTR X 20 X 10 B  X 10 B    NMR Bird Dog X 10 B  X 10 B     NMR Quadruped cat/cow X 10 B  X 10 B      NMR Raised quadruped holds  X 3 10" holds X 3 10" holds     NMR Side planks  X 3 10" holds B        NMR  Mod plank rocking to prayer stretch X5 X 5     PLAN                 CPT Codes available for Billing:   (-) minutes " of Manual therapy (MT) to improve pain and ROM.  (8) minutes of Therapeutic Exercise (TE) to develop strength, endurance, range of motion, and flexibility.  (52) minutes of Neuromuscular Re-Education (NMR)  to improve: Balance, Coordination, Kinesthetic, Sense, Proprioception, and Posture.  (-) minutes of Therapeutic Activities (TA) to improve functional performance.  (-) minutes of Gait Training (GT) to improve functional mobility and safety  Unattended Electrical Stimulation (ES) for muscle performance or pain modulation.  Vasopneumatic Device Therapy () for management of swelling/edema. (77706)  BFR: Blood flow restriction applied during exercise  NP or (-): Not Performed     Patient Education and Home Exercises       Education provided:   - education and justification of treatments performed     Written Home Exercises Provided: Patient instructed to cont prior HEP. Exercises were reviewed and Liana was able to demonstrate them prior to the end of the session.  Liana demonstrated good  understanding of the education provided. See EMR under Patient Instructions for exercises provided during therapy sessions    Assessment   Treatment continued focusing on neuromotor control development and mobility with progressions to further postural training/core control. Pt tolerates this well noting no pain by midway through session. Cues were needed for technique and to facilitate proper muscle activation still. Shaking is noted still will attempted sustained core activation.  Will continue to progress per tolerance.    Liana Is progressing well towards her goals.   Pt prognosis is Good.     Pt will continue to benefit from skilled outpatient physical therapy to address the deficits listed in the problem list box on initial evaluation, provide pt/family education and to maximize pt's level of independence in the home and community environment.     Pt's spiritual, cultural and educational needs considered and pt agreeable to  plan of care and goals.     Anticipated barriers to physical therapy: none    Goals:   Short Term Goals:  3 weeks Progress   8/14/2024   Pain: Pt will demonstrate improved pain by reports of less than or equal to 5/10 worst pain on the verbal rating scale in order to progress toward maximal functional ability and improve QOL. PC   HEP: Patient will demonstrate independence with HEP in order to progress toward functional independence. PC      Long Term Goals:  6 weeks Progress  8/14/2024   Pain: Pt will demonstrate improved pain by reports of less than or equal to 2/10 worst pain on the verbal rating scale in order to progress toward maximal functional ability and improve QOL.   PC   Function: Patient will demonstrate improved function as indicated by a functional score of greater than or equal to 80 out of 100 on FOTO. PC   Postural Endurance: Patient will demonstrate improved core stability and trunk muscle endurance with performance of plank on elbows for 30 seconds without pain or significant shaking for better postural tolerance at work. PC   Strength: Patient will improve strength to stated goals, listed in objective measures above, in order to improve functional independence and quality of life. PC   GOALS KEY:   PC= progressing/continue;   PM= partially met;             DC= discontinue    Plan     Plan of care Certification: 8/14/2024 to 9/25/2024.     Outpatient Physical Therapy 2 times weekly for 6 weeks to include the following interventions: Cervical/Lumbar Traction, Electrical Stimulation with or without FDN, Gait Training, Manual Therapy, Moist Heat/ Ice, Neuromuscular Re-ed, Orthotic Management and Training, Patient Education, Self Care, Therapeutic Activities, Therapeutic Exercise, and Dry Needling .     Adriana Zapien, PT

## 2024-08-26 NOTE — PROGRESS NOTES
"[facility]   Physical Therapy Treatment Note      Name: Liana Keane  Clinic Number: [id number]    Therapy Diagnosis:   Encounter Diagnosis   Name Primary?    Weakness of trunk musculature Yes       Physician: Leander Song MD    Visit Date: 8/27/2024    Physician Orders: PT Eval and Treat   Medical Diagnosis from Referral: Lumbar radiculopathy [M54.16]   Evaluation Date: 8/14/2024  Authorization Period Expiration: 12/31/2024  Plan of Care Expiration: 9/25/2024  Progress Note Due: 9/13/2024  Visit # / Visits authorized: 4/ 12   FOTO: 1/3(FOTO NV)     Precautions: Standard     PTA Visit #: -/5     Time In: 5:30 pm   Time Out: 6:25 pm   Total Billable Time: 55 minutes (partially 1:1 with trained technician)     Subjective     Pt reports: she has been having more discomfort in the R side of her low back over the past few days.   She was compliant with home exercise program.  Response to previous treatment: soreness in R side of back  Functional change: n/a today    Pain: 3/10  Location: low back    Objective      Objective Measures updated at progress report unless specified.     Treatment     Liana received the following treatments:     CPT Intervention  JointFocus Duration / Intensity  8/27/2024 Duration / Intensity  8/23/2024 Duration / Intensity  8/21/2024   TE Nustep 5min  - -   TE Figure four QL stretch X5 B   X5 B X5 B B x 5    TE Open Book  X 6 B  - X 6 B    NMR PPT + Supine march  2x10 3"holds  2 x 10 3" holds 2 x 10 3" holds    NMR TA activation with ball press  Mod dead bug 2x10 Mod dead bug  2 x 10  +Mod dead bug 2 x 10    NMR SL clamshells  RTB 2X10 5" holds  RTB 2 x 10 5" holds RTB 2 x 10 5"    NMR Bridges  3x10 3 x 10  2x 10    TE   LTR X20 X 20 X 10 B    NMR Bird Dog X 10 B   X10B X 10 B  X 10 B   NMR Quadruped cat/cow X 10 B X 10 B  X 10 B    NMR Raised quadruped holds  X 3 10" holds X 3 10" holds X 3 10" holds   NMR Side planks  - X 3 10" holds B      NMR  Mod plank " rocking to prayer stretch X5  X5 X5 X 5   PLAN               CPT Codes available for Billing:   (-) minutes of Manual therapy (MT) to improve pain and ROM.  (8) minutes of Therapeutic Exercise (TE) to develop strength, endurance, range of motion, and flexibility.  (47) minutes of Neuromuscular Re-Education (NMR)  to improve: Balance, Coordination, Kinesthetic, Sense, Proprioception, and Posture.  (-) minutes of Therapeutic Activities (TA) to improve functional performance.  (-) minutes of Gait Training (GT) to improve functional mobility and safety  Unattended Electrical Stimulation (ES) for muscle performance or pain modulation.  Vasopneumatic Device Therapy () for management of swelling/edema. (59200)  BFR: Blood flow restriction applied during exercise  NP or (-): Not Performed     Patient Education and Home Exercises       Education provided:   - education and justification of treatments performed     Written Home Exercises Provided: Patient instructed to cont prior HEP. Exercises were reviewed and Liana was able to demonstrate them prior to the end of the session.  Liana demonstrated good  understanding of the education provided. See EMR under Patient Instructions for exercises provided during therapy sessions    Assessment   Treatment continued focusing on neuromotor control development and mobility. Held side planks today to not exacerbate unilateral soreness at trunk. Pt tolerates this well noting a decrease in pain by midway through session. Cues were needed for technique and to facilitate proper muscle activation still. Will continue to progress per tolerance.    Liana Is progressing well towards her goals.   Pt prognosis is Good.     Pt will continue to benefit from skilled outpatient physical therapy to address the deficits listed in the problem list box on initial evaluation, provide pt/family education and to maximize pt's level of independence in the home and community environment.     Pt's spiritual,  cultural and educational needs considered and pt agreeable to plan of care and goals.     Anticipated barriers to physical therapy: none    Goals:   Short Term Goals:  3 weeks Progress   8/14/2024   Pain: Pt will demonstrate improved pain by reports of less than or equal to 5/10 worst pain on the verbal rating scale in order to progress toward maximal functional ability and improve QOL. PC   HEP: Patient will demonstrate independence with HEP in order to progress toward functional independence. PC      Long Term Goals:  6 weeks Progress  8/14/2024   Pain: Pt will demonstrate improved pain by reports of less than or equal to 2/10 worst pain on the verbal rating scale in order to progress toward maximal functional ability and improve QOL.   PC   Function: Patient will demonstrate improved function as indicated by a functional score of greater than or equal to 80 out of 100 on FOTO. PC   Postural Endurance: Patient will demonstrate improved core stability and trunk muscle endurance with performance of plank on elbows for 30 seconds without pain or significant shaking for better postural tolerance at work. PC   Strength: Patient will improve strength to stated goals, listed in objective measures above, in order to improve functional independence and quality of life. PC   GOALS KEY:   PC= progressing/continue;   PM= partially met;             DC= discontinue    Plan     Plan of care Certification: 8/14/2024 to 9/25/2024.     Outpatient Physical Therapy 2 times weekly for 6 weeks to include the following interventions: Cervical/Lumbar Traction, Electrical Stimulation with or without FDN, Gait Training, Manual Therapy, Moist Heat/ Ice, Neuromuscular Re-ed, Orthotic Management and Training, Patient Education, Self Care, Therapeutic Activities, Therapeutic Exercise, and Dry Needling .     Adriana Zapien, PT

## 2024-08-27 ENCOUNTER — CLINICAL SUPPORT (OUTPATIENT)
Dept: REHABILITATION | Facility: HOSPITAL | Age: 54
End: 2024-08-27
Payer: COMMERCIAL

## 2024-08-27 DIAGNOSIS — M62.81 WEAKNESS OF TRUNK MUSCULATURE: Primary | ICD-10-CM

## 2024-08-27 PROCEDURE — 97112 NEUROMUSCULAR REEDUCATION: CPT | Mod: PN

## 2024-08-27 PROCEDURE — 97110 THERAPEUTIC EXERCISES: CPT | Mod: PN

## 2024-08-29 ENCOUNTER — CLINICAL SUPPORT (OUTPATIENT)
Dept: REHABILITATION | Facility: HOSPITAL | Age: 54
End: 2024-08-29
Payer: COMMERCIAL

## 2024-08-29 DIAGNOSIS — M62.81 WEAKNESS OF TRUNK MUSCULATURE: Primary | ICD-10-CM

## 2024-08-29 PROCEDURE — 97112 NEUROMUSCULAR REEDUCATION: CPT | Mod: PN

## 2024-08-29 PROCEDURE — 97110 THERAPEUTIC EXERCISES: CPT | Mod: PN

## 2024-08-30 NOTE — PROGRESS NOTES
"[facility]   Physical Therapy Treatment Note      Name: Liana Keane  Clinic Number: [id number]    Therapy Diagnosis:   Encounter Diagnosis   Name Primary?    Weakness of trunk musculature Yes       Physician: Leander Song MD    Visit Date: 8/29/2024    Physician Orders: PT Eval and Treat   Medical Diagnosis from Referral: Lumbar radiculopathy [M54.16]   Evaluation Date: 8/14/2024  Authorization Period Expiration: 12/31/2024  Plan of Care Expiration: 9/25/2024  Progress Note Due: 9/13/2024  Visit # / Visits authorized: 5/ 12   FOTO: 2/3     Precautions: Standard     PTA Visit #: -/5     Time In: 5:30 pm   Time Out: 6:25 pm   Total Billable Time: 55 minutes (partially 1:1 with trained technician)     Subjective     Pt reports: she is having some L flank pain still.  She was compliant with home exercise program.  Response to previous treatment: soreness   Functional change: n/a today    Pain: 2/10  Location: low back    Objective      Objective Measures updated at progress report unless specified.     Treatment     Liana received the following treatments:     CPT Intervention  JointFocus Duration / Intensity  8/29/2024 Duration / Intensity  8/26/2024 Duration / Intensity  8/23/2024   TE Nustep 5 mins 5min  -   TE Side bend stretch  5 x10" holds       TE Figure four QL stretch X5 B X5 B X5 B   TE Open Book  X8 B X 6 B  -   NMR PPT + Supine march  2x10 3" holds 2x10 3"holds  2 x 10 3" holds   NMR TA activation with ball press  Mod dead bug 2 x 10   Mod dead bug 2x10 Mod dead bug 2x10 Mod dead bug  2 x 10    NMR SL clamshells  RTB 2 x 10 5" holds  RTB 2x10 5" holds RTB 2X10 5" holds  RTB 2 x 10 5" holds   NMR Bridges  3x10 3x10 3 x 10    TE   LTR X 20  X20 X 20   NMR Bird Dog - X 10 B   X10B X 10 B    NMR Quadruped cat/cow - X 10 B X 10 B    NMR Raised quadruped holds  - X 3 10" holds X 3 10" holds   NMR Side planks  3 x 10" holds B  - X 3 10" holds B    NMR  Mod plank rocking to " td stretch - X5  X5 X5   PLAN                 CPT Codes available for Billing:   (-) minutes of Manual therapy (MT) to improve pain and ROM.  (8) minutes of Therapeutic Exercise (TE) to develop strength, endurance, range of motion, and flexibility.  (47) minutes of Neuromuscular Re-Education (NMR)  to improve: Balance, Coordination, Kinesthetic, Sense, Proprioception, and Posture.  (-) minutes of Therapeutic Activities (TA) to improve functional performance.  (-) minutes of Gait Training (GT) to improve functional mobility and safety  Unattended Electrical Stimulation (ES) for muscle performance or pain modulation.  Vasopneumatic Device Therapy () for management of swelling/edema. (04658)  BFR: Blood flow restriction applied during exercise  NP or (-): Not Performed     Patient Education and Home Exercises       Education provided:   - education and justification of treatments performed     Written Home Exercises Provided: Patient instructed to cont prior HEP. Exercises were reviewed and Liana was able to demonstrate them prior to the end of the session.  Liana demonstrated good  understanding of the education provided. See EMR under Patient Instructions for exercises provided during therapy sessions    Assessment   Treatment continued focusing on neuromotor control development and mobility. Added in more unilateral stretching for discomfort at the L side today.  Also, more of the quadruped exercises were avoided, as it seems pain onset is surrounding the latissimus dorsi. Cues were needed for technique and to facilitate proper muscle activation still. Will continue to progress per tolerance.    Liana Is progressing well towards her goals.   Pt prognosis is Good.     Pt will continue to benefit from skilled outpatient physical therapy to address the deficits listed in the problem list box on initial evaluation, provide pt/family education and to maximize pt's level of independence in the home and community  environment.     Pt's spiritual, cultural and educational needs considered and pt agreeable to plan of care and goals.     Anticipated barriers to physical therapy: none    Goals:   Short Term Goals:  3 weeks Progress   8/14/2024   Pain: Pt will demonstrate improved pain by reports of less than or equal to 5/10 worst pain on the verbal rating scale in order to progress toward maximal functional ability and improve QOL. PC   HEP: Patient will demonstrate independence with HEP in order to progress toward functional independence. PC      Long Term Goals:  6 weeks Progress  8/14/2024   Pain: Pt will demonstrate improved pain by reports of less than or equal to 2/10 worst pain on the verbal rating scale in order to progress toward maximal functional ability and improve QOL.   PC   Function: Patient will demonstrate improved function as indicated by a functional score of greater than or equal to 80 out of 100 on FOTO. PC   Postural Endurance: Patient will demonstrate improved core stability and trunk muscle endurance with performance of plank on elbows for 30 seconds without pain or significant shaking for better postural tolerance at work. PC   Strength: Patient will improve strength to stated goals, listed in objective measures above, in order to improve functional independence and quality of life. PC   GOALS KEY:   PC= progressing/continue;   PM= partially met;             DC= discontinue    Plan     Plan of care Certification: 8/14/2024 to 9/25/2024.     Outpatient Physical Therapy 2 times weekly for 6 weeks to include the following interventions: Cervical/Lumbar Traction, Electrical Stimulation with or without FDN, Gait Training, Manual Therapy, Moist Heat/ Ice, Neuromuscular Re-ed, Orthotic Management and Training, Patient Education, Self Care, Therapeutic Activities, Therapeutic Exercise, and Dry Needling .     Adriana Zapien, PT

## 2024-09-04 ENCOUNTER — CLINICAL SUPPORT (OUTPATIENT)
Dept: REHABILITATION | Facility: HOSPITAL | Age: 54
End: 2024-09-04
Payer: COMMERCIAL

## 2024-09-04 ENCOUNTER — DOCUMENTATION ONLY (OUTPATIENT)
Dept: REHABILITATION | Facility: HOSPITAL | Age: 54
End: 2024-09-04

## 2024-09-04 DIAGNOSIS — M62.81 WEAKNESS OF TRUNK MUSCULATURE: Primary | ICD-10-CM

## 2024-09-04 PROCEDURE — 97140 MANUAL THERAPY 1/> REGIONS: CPT | Mod: PN,CQ

## 2024-09-04 PROCEDURE — 97112 NEUROMUSCULAR REEDUCATION: CPT | Mod: PN,CQ

## 2024-09-04 PROCEDURE — 97110 THERAPEUTIC EXERCISES: CPT | Mod: PN,CQ

## 2024-09-04 NOTE — PROGRESS NOTES
PT/PTA met face to face to discuss pt's treatment plan and progress towards established goals. Pt will be seen by a physical therapist minimally every 6th visit or every 30 days.    Aruna Santana PTA

## 2024-09-04 NOTE — PROGRESS NOTES
"[facility]   Physical Therapy Treatment Note      Name: Liana Keane  Clinic Number: [id number]    Therapy Diagnosis:   Encounter Diagnosis   Name Primary?    Weakness of trunk musculature Yes         Physician: Leander Song MD    Visit Date: 9/4/2024    Physician Orders: PT Eval and Treat   Medical Diagnosis from Referral: Lumbar radiculopathy [M54.16]   Evaluation Date: 8/14/2024  Authorization Period Expiration: 12/31/2024  Plan of Care Expiration: 9/25/2024  Progress Note Due: 9/13/2024  Visit # / Visits authorized: 6/ 12   FOTO: 2/3     Precautions: Standard     PTA Visit #: 1/5     Time In: 3:05 pm   Time Out: 3:55 pm   Total Billable Time: 50 minutes    Subjective     Pt reports: continued L flank pain still.  She was compliant with home exercise program.  Response to previous treatment: soreness   Functional change: n/a today    Pain: 0/10  Location: low back    Objective      Objective Measures updated at progress report unless specified.     Treatment     Liana received the following treatments:     CPT Intervention  JointFocus Duration / Intensity  9/4/2024 Duration / Intensity  8/26/2024 Duration / Intensity  8/23/2024    Nustep  5min  -   MAN STM to left QL in side lying over pillow        TE Figure four QL stretch X5 B X5 B X5 B   TE Open Book  10x B X 6 B  -   NMR PPT + Supine march  2x10 3" holds 2x10 3"holds  2 x 10 3" holds   NMR TA activation with ball press  x10  Mod dead bug 2 x 10  Mod dead bug 2x10 Mod dead bug  2 x 10    NMR SL clamshells  RTB 2 x 10 5" holds RTB 2X10 5" holds  RTB 2 x 10 5" holds   NMR Bridges  3x10 3x10 3 x 10    TE  LTR X 20  X20 X 20   NMR Bird Dog 2X10 B X 10 B   X10B X 10 B    NMR Quadruped cat/cow - X 10 B X 10 B    NMR Raised quadruped holds  - X 3 10" holds X 3 10" holds   NMR Side planks  3 x 10" holds B  - X 3 10" holds B    NMR  Mod plank rocking to prayer stretch - X5  X5 X5   PLAN                 CPT Codes available for " Billing:   (10) minutes of Manual therapy (MT) to improve pain and ROM.  (10) minutes of Therapeutic Exercise (TE) to develop strength, endurance, range of motion, and flexibility.  (30) minutes of Neuromuscular Re-Education (NMR)  to improve: Balance, Coordination, Kinesthetic, Sense, Proprioception, and Posture.  (-) minutes of Therapeutic Activities (TA) to improve functional performance.  (-) minutes of Gait Training (GT) to improve functional mobility and safety  Unattended Electrical Stimulation (ES) for muscle performance or pain modulation.  Vasopneumatic Device Therapy () for management of swelling/edema. (64537)  BFR: Blood flow restriction applied during exercise  NP or (-): Not Performed     Patient Education and Home Exercises       Education provided:   - education and justification of treatments performed     Written Home Exercises Provided: Patient instructed to cont prior HEP. Exercises were reviewed and Liana was able to demonstrate them prior to the end of the session.  Liana demonstrated good  understanding of the education provided. See EMR under Patient Instructions for exercises provided during therapy sessions    Assessment   Treatment consisted of manual therapy and core stabilization exercises. Patient has good tolerance to side lying lat and ql release. Core strengthening exercises performed with focus on exhaling with core activation    Liana Is progressing well towards her goals.   Pt prognosis is Good.     Pt will continue to benefit from skilled outpatient physical therapy to address the deficits listed in the problem list box on initial evaluation, provide pt/family education and to maximize pt's level of independence in the home and community environment.     Pt's spiritual, cultural and educational needs considered and pt agreeable to plan of care and goals.     Anticipated barriers to physical therapy: none    Goals:   Short Term Goals:  3 weeks Progress   8/14/2024   Pain: Pt will  demonstrate improved pain by reports of less than or equal to 5/10 worst pain on the verbal rating scale in order to progress toward maximal functional ability and improve QOL. PC   HEP: Patient will demonstrate independence with HEP in order to progress toward functional independence. PC      Long Term Goals:  6 weeks Progress  8/14/2024   Pain: Pt will demonstrate improved pain by reports of less than or equal to 2/10 worst pain on the verbal rating scale in order to progress toward maximal functional ability and improve QOL.   PC   Function: Patient will demonstrate improved function as indicated by a functional score of greater than or equal to 80 out of 100 on FOTO. PC   Postural Endurance: Patient will demonstrate improved core stability and trunk muscle endurance with performance of plank on elbows for 30 seconds without pain or significant shaking for better postural tolerance at work. PC   Strength: Patient will improve strength to stated goals, listed in objective measures above, in order to improve functional independence and quality of life. PC   GOALS KEY:   PC= progressing/continue;   PM= partially met;             DC= discontinue    Plan     Plan of care Certification: 8/14/2024 to 9/25/2024.     Outpatient Physical Therapy 2 times weekly for 6 weeks to include the following interventions: Cervical/Lumbar Traction, Electrical Stimulation with or without FDN, Gait Training, Manual Therapy, Moist Heat/ Ice, Neuromuscular Re-ed, Orthotic Management and Training, Patient Education, Self Care, Therapeutic Activities, Therapeutic Exercise, and Dry Needling .     Aruna Santana, PTA

## 2024-09-10 ENCOUNTER — CLINICAL SUPPORT (OUTPATIENT)
Dept: REHABILITATION | Facility: HOSPITAL | Age: 54
End: 2024-09-10
Payer: COMMERCIAL

## 2024-09-10 DIAGNOSIS — M62.81 WEAKNESS OF TRUNK MUSCULATURE: Primary | ICD-10-CM

## 2024-09-10 PROCEDURE — 97110 THERAPEUTIC EXERCISES: CPT | Mod: PN

## 2024-09-10 PROCEDURE — 97112 NEUROMUSCULAR REEDUCATION: CPT | Mod: PN

## 2024-09-10 NOTE — PROGRESS NOTES
"[facility]   Physical Therapy Treatment Note      Name: Liana Keane  Clinic Number: [id number]    Therapy Diagnosis:   Encounter Diagnosis   Name Primary?    Weakness of trunk musculature Yes       Physician: Leander Song MD    Visit Date: 9/10/2024    Physician Orders: PT Eval and Treat   Medical Diagnosis from Referral: Lumbar radiculopathy [M54.16]   Evaluation Date: 8/14/2024  Authorization Period Expiration: 12/31/2024  Plan of Care Expiration: 9/25/2024  Progress Note Due: 9/13/2024  Visit # / Visits authorized: 7/ 12   FOTO: 2/3     Precautions: Standard     PTA Visit #: 1/5     Time In: 4:30 pm   Time Out: 5:30 pm   Total Billable Time: 60 minutes    Subjective     Pt reports: her low back and L flank pain has both been improved since last seen.   She was compliant with home exercise program.  Response to previous treatment: soreness   Functional change: n/a today    Pain: 0/10  Location: low back    Objective      Objective Measures updated at progress report unless specified.     Treatment     Liana received the following treatments:       CPT Intervention  JointFocus Duration / Intensity  9/10/2024 Duration / Intensity  9/4/2024 Duration / Intensity  8/26/2024   TE Nustep 5 minutes   5min    TE Figure four QL stretch X 5 B X5 B X5 B   TE Open Book  10x B  10x B X 6 B    NMR PPT + Supine march  2 x 10 3" holds  2x10 3" holds 2x10 3"holds    NMR Table top holds  DLX 10 5" holds        NMR SL clamshells  GTB 2 x 10 5" holds RTB 2 x 10 5" holds RTB 2X10 5" holds    NMR Bridges  3 x 10  3x10 3x10   NMR Bird Dog 2 x 10 B  2X10 B X 10 B   X10B   NMR Quadruped cat/cow - - X 10 B   NMR Raised quadruped holds  - - X 3 10" holds   NMR Side planks  4 x10" holds B 3 x 10" holds B  -   NMR  Mod plank rocking to prayer stretch - - X5  X5   PLAN                  CPT Codes available for Billing:   (-) minutes of Manual therapy (MT) to improve pain and ROM.  (15) minutes of Therapeutic " Exercise (TE) to develop strength, endurance, range of motion, and flexibility.  (45) minutes of Neuromuscular Re-Education (NMR)  to improve: Balance, Coordination, Kinesthetic, Sense, Proprioception, and Posture.  (-) minutes of Therapeutic Activities (TA) to improve functional performance.  (-) minutes of Gait Training (GT) to improve functional mobility and safety  Unattended Electrical Stimulation (ES) for muscle performance or pain modulation.  Vasopneumatic Device Therapy () for management of swelling/edema. (60811)  BFR: Blood flow restriction applied during exercise  NP or (-): Not Performed     Patient Education and Home Exercises       Education provided:   - education and justification of treatments performed     Written Home Exercises Provided: Patient instructed to cont prior HEP. Exercises were reviewed and Liana was able to demonstrate them prior to the end of the session.  Liana demonstrated good  understanding of the education provided. See EMR under Patient Instructions for exercises provided during therapy sessions    Assessment   Treatment consisted of manual therapy and core stabilization exercises with progressions as treatment is improved. Pt denies any pain today so manual was held to focus more on neuromotor control development of postural muscles. Cues are needed for pacing and technique. Will continue to progress per tolerance.     Liana Is progressing well towards her goals.   Pt prognosis is Good.     Pt will continue to benefit from skilled outpatient physical therapy to address the deficits listed in the problem list box on initial evaluation, provide pt/family education and to maximize pt's level of independence in the home and community environment.     Pt's spiritual, cultural and educational needs considered and pt agreeable to plan of care and goals.     Anticipated barriers to physical therapy: none    Goals:   Short Term Goals:  3 weeks Progress   8/14/2024   Pain: Pt will  demonstrate improved pain by reports of less than or equal to 5/10 worst pain on the verbal rating scale in order to progress toward maximal functional ability and improve QOL. PC   HEP: Patient will demonstrate independence with HEP in order to progress toward functional independence. PC      Long Term Goals:  6 weeks Progress  8/14/2024   Pain: Pt will demonstrate improved pain by reports of less than or equal to 2/10 worst pain on the verbal rating scale in order to progress toward maximal functional ability and improve QOL.   PC   Function: Patient will demonstrate improved function as indicated by a functional score of greater than or equal to 80 out of 100 on FOTO. PC   Postural Endurance: Patient will demonstrate improved core stability and trunk muscle endurance with performance of plank on elbows for 30 seconds without pain or significant shaking for better postural tolerance at work. PC   Strength: Patient will improve strength to stated goals, listed in objective measures above, in order to improve functional independence and quality of life. PC   GOALS KEY:   PC= progressing/continue;   PM= partially met;             DC= discontinue    Plan     Plan of care Certification: 8/14/2024 to 9/25/2024.     Outpatient Physical Therapy 2 times weekly for 6 weeks to include the following interventions: Cervical/Lumbar Traction, Electrical Stimulation with or without FDN, Gait Training, Manual Therapy, Moist Heat/ Ice, Neuromuscular Re-ed, Orthotic Management and Training, Patient Education, Self Care, Therapeutic Activities, Therapeutic Exercise, and Dry Needling .     Adriana Zapien, PT

## 2024-09-16 ENCOUNTER — CLINICAL SUPPORT (OUTPATIENT)
Dept: REHABILITATION | Facility: HOSPITAL | Age: 54
End: 2024-09-16
Payer: COMMERCIAL

## 2024-09-16 DIAGNOSIS — M62.81 WEAKNESS OF TRUNK MUSCULATURE: Primary | ICD-10-CM

## 2024-09-16 PROCEDURE — 97112 NEUROMUSCULAR REEDUCATION: CPT | Mod: PN

## 2024-09-16 PROCEDURE — 97110 THERAPEUTIC EXERCISES: CPT | Mod: PN

## 2024-09-17 NOTE — PROGRESS NOTES
"[facility]   Physical Therapy Treatment Note      Name: Liana Keane  Clinic Number: [id number]    Therapy Diagnosis:   Encounter Diagnosis   Name Primary?    Weakness of trunk musculature Yes       Physician: Leander Song MD    Visit Date: 9/16/2024    Physician Orders: PT Eval and Treat   Medical Diagnosis from Referral: Lumbar radiculopathy [M54.16]   Evaluation Date: 8/14/2024  Authorization Period Expiration: 12/31/2024  Plan of Care Expiration: 9/25/2024  Progress Note Due: 9/13/2024  Visit # / Visits authorized: 8/ 12   FOTO: 2/3     Precautions: Standard     PTA Visit #: 1/5     Time In: 4:30 pm   Time Out: 5:30 pm   Total Billable Time: 60 minutes (partially 1:1 with trained technician)    Subjective     Pt reports: she has been having some increased pain in the low back and R lower extremity this evening   She was compliant with home exercise program.  Response to previous treatment: soreness   Functional change: n/a today    Pain: 3/10  Location: low back    Objective      Objective Measures updated at progress report unless specified.     Treatment     Liana received the following treatments:     CPT Intervention  JointFocus Duration / Intensity  9/16/2024 Duration / Intensity  9/10/2024 Duration / Intensity  9/4/2024   TE Nustep 6 minutes  5 minutes     TE Figure four QL stretch X5 B  X 5 B X5 B   TE Open Book  10x B 10x B  10x B   NMR PPT + Supine march  2 x 10 3" holds 2 x 10 3" holds  2x10 3" holds   NMR Table top holds  DL x 5 15" holds DLX 10 5" holds      NMR SL clamshells  GTB 2 x 10 5" holds GTB 2 x 10 5" holds RTB 2 x 10 5" holds   NMR Bridges  3 x 10  3 x 10  3x10   NMR Bird Dog 2 x 10  2 x 10 B  2X10 B   NMR Quadruped cat/cow - - -   NMR Raised quadruped holds   hip hikes x 6 B  - -   NMR Side planks  4 x 10" holds B 4 x10" holds B 3 x 10" holds B    NMR  Mod plank rocking to prayer stretch - - -   PLAN              CPT Codes available for Billing:   (-) " minutes of Manual therapy (MT) to improve pain and ROM.  (15) minutes of Therapeutic Exercise (TE) to develop strength, endurance, range of motion, and flexibility.  (45) minutes of Neuromuscular Re-Education (NMR)  to improve: Balance, Coordination, Kinesthetic, Sense, Proprioception, and Posture.  (-) minutes of Therapeutic Activities (TA) to improve functional performance.  (-) minutes of Gait Training (GT) to improve functional mobility and safety  Unattended Electrical Stimulation (ES) for muscle performance or pain modulation.  Vasopneumatic Device Therapy () for management of swelling/edema. (52780)  BFR: Blood flow restriction applied during exercise  NP or (-): Not Performed     Patient Education and Home Exercises       Education provided:   - education and justification of treatments performed     Written Home Exercises Provided: Patient instructed to cont prior HEP. Exercises were reviewed and Liana was able to demonstrate them prior to the end of the session.  Liana demonstrated good  understanding of the education provided. See EMR under Patient Instructions for exercises provided during therapy sessions    Assessment   Treatment consisted of manual therapy and core stabilization exercises. Low back pain that patient presented with is noted to be relieved within the first few exercises today, allowing for progressions. Cues are needed for pacing and technique. Will continue to progress per tolerance.     Liana Is progressing well towards her goals.   Pt prognosis is Good.     Pt will continue to benefit from skilled outpatient physical therapy to address the deficits listed in the problem list box on initial evaluation, provide pt/family education and to maximize pt's level of independence in the home and community environment.     Pt's spiritual, cultural and educational needs considered and pt agreeable to plan of care and goals.     Anticipated barriers to physical therapy: none    Goals:   Short  Term Goals:  3 weeks Progress   8/14/2024   Pain: Pt will demonstrate improved pain by reports of less than or equal to 5/10 worst pain on the verbal rating scale in order to progress toward maximal functional ability and improve QOL. PC   HEP: Patient will demonstrate independence with HEP in order to progress toward functional independence. PC      Long Term Goals:  6 weeks Progress  8/14/2024   Pain: Pt will demonstrate improved pain by reports of less than or equal to 2/10 worst pain on the verbal rating scale in order to progress toward maximal functional ability and improve QOL.   PC   Function: Patient will demonstrate improved function as indicated by a functional score of greater than or equal to 80 out of 100 on FOTO. PC   Postural Endurance: Patient will demonstrate improved core stability and trunk muscle endurance with performance of plank on elbows for 30 seconds without pain or significant shaking for better postural tolerance at work. PC   Strength: Patient will improve strength to stated goals, listed in objective measures above, in order to improve functional independence and quality of life. PC   GOALS KEY:   PC= progressing/continue;   PM= partially met;             DC= discontinue    Plan     Plan of care Certification: 8/14/2024 to 9/25/2024.     Outpatient Physical Therapy 2 times weekly for 6 weeks to include the following interventions: Cervical/Lumbar Traction, Electrical Stimulation with or without FDN, Gait Training, Manual Therapy, Moist Heat/ Ice, Neuromuscular Re-ed, Orthotic Management and Training, Patient Education, Self Care, Therapeutic Activities, Therapeutic Exercise, and Dry Needling .     Adriana Zapien, PT

## 2024-09-19 NOTE — PROGRESS NOTES
"Established Patient - TeleHealth Visit    The patient location is: [place]  The chief complaint leading to consultation is: chronic pain     Visit type: audiovisual    Face to Face time with patient: 10-15 minutes  20 minutes of total time spent on the encounter, which includes face to face time and non-face to face time preparing to see the patient (eg, review of tests), Obtaining and/or reviewing separately obtained history, Documenting clinical information in the electronic or other health record, Independently interpreting results (not separately reported) and communicating results to the patient/family/caregiver, or Care coordination (not separately reported).     Each patient to whom he or she provides medical services by telemedicine is:  (1) informed of the relationship between the physician and patient and the respective role of any other health care provider with respect to management of the patient; and (2) notified that he or she may decline to receive medical services by telemedicine and may withdraw from such care at any time.        Chronic Pain -- Established Patient (Follow-up visit)  Referring Physician: Hector Barker MD    PCP: JEFF Simmons MD      Chief Complaint:  Chief Complaint   Patient presents with    Follow-up     C/o low back pain with RLE radicular pain -pain worse at night       SUBJECTIVE:    Interval History (9/20/2024):  Patient presents today for follow-up visit to review lumbar MRI. At that visit, the plan was to start Lyrica at night and order formal physical therapy. Patient reports pain as "0/10 in the morning, 10/10 at night when going to bed. Pain is also worse with activity.  She has been taking the Lyrica at night, which is helping some.  She has also been attending physical therapy, which is also helping.    Initial HPI (08/13/2024) - Dr. Song:  Liana Keane is a 53 y.o. female who presents to the clinic for the evaluation of lower back pain.   Patient " reports 2 month history of lower back pain.  Patient denies any inciting traumas to her lower back pain.  Patient denies any previous surgeries on her lumbar spine or major joints.  Pain is described as a throbbing aching pain in the lower back in the right lower extremity.  Patient reports that most painful location is the right anterior thigh that is described as a burning pain.  Patient reports that lower back pain does seem to radiate to her right anterior thigh.  Patient reports occasional radiation to her left lower extremity, but reports that her right-sided pain is her primary pain.  Pain is worse when we up in the morning and lying on her right side, better with rest.  Pain is currently rated a 2/10, but can increase to an 8/10 with exacerbating activity.  Patient denies any fevers, chills, saddle anesthesia, or bowel and bladder incontinence.      Non-Pharmacologic Treatments:  Physical Therapy/Home Exercise: yes  Ice/Heat:yes  TENS: no  Acupuncture: no  Massage: yes  Chiropractic: no        Previous Pain Medications:  Tylenol, meloxicam, ibuprofen, Flexeril, topicals      Pain Procedures:   None         Pain Disability Index Review:       No data to display                    Imaging/ Diagnostic Studies/ Labs (Reviewed on 9/20/2024):    08/19/2024 MRI Lumbar Spine Without Contrast  COMPARISON:  08/13/2024 radiograph    FINDINGS:  Vertebral body heights maintained.  No spondylolisthesis.  Mild Modic type 1 endplate changes of the L5 inferior endplate.  Mild perifacet edematous changes at the L4-5 and L5-S1 level.  Multilevel early intervertebral disc desiccation changes without significant height loss.    Conus terminates normally.  Visualized intra-abdominal/pelvic structures unremarkable.    L1-L2: No spinal canal stenosis or neural foraminal narrowing.    L2-L3: No spinal canal stenosis or neural foraminal narrowing.    L3-L4: Minimal circumferential disc bulge without spinal canal stenosis or  significant neural foraminal narrowing.  Mild-to-moderate facet arthropathy.    L4-L5: Mild circumferential disc bulging without spinal canal stenosis.  Moderate facet arthropathy contribute to mild bilateral inferior neural foraminal narrowing at L4-5.    L5-S1: Mild circumferential disc bulge without spinal canal stenosis.  Mild-to-moderate facet arthropathy causing minimal bilateral inferior neural foraminal narrowing at L5-S1.  Impression:   Multilevel lower lumbar spine primarily facet degenerative findings without high-grade spinal canal stenosis or neural foraminal narrowing.      08/13/2024 X-Ray Lumbar Complete Including Flex And Ext  COMPARISON: None  FINDINGS:  Seven views of the lumbar spine were obtained.  No evidence of acute fracture or dislocation.  Bony mineralization is normal.  Soft tissues are unremarkable.   Mild spondylosis throughout the visualized thoracolumbar spine.  Mild facet arthropathy lower lumbar spine.  Normal alignment on flexion extension views.  Impression  No acute abnormalities        06/13/24 X-Ray Hip 2 or 3 views Right with Pelvis when performed  FINDINGS:  No pelvic fracture.  No fracture dislocation or degenerative change of the right hip.  No evidence for avascular necrosis of the right femoral head.  Impression  Normal pelvis and right hip x-ray.      7/9/2024 BLE EMG/NCS   ABNORMAL study  2. There is electrodiagnostic evidence of a mild subacute on chronic radiculopathy of the right L5 nerve root                Review of Systems:   Constitutional:  Negative for chills, diaphoresis, fatigue and fever.   HENT:  Negative for ear discharge, ear pain, rhinorrhea, trouble swallowing and voice change.    Respiratory:  Negative for chest tightness, shortness of breath, wheezing and stridor.    Cardiovascular:  Negative for chest pain and leg swelling.   Gastrointestinal:  Negative for blood in stool, diarrhea, nausea and vomiting.   Endocrine: Negative for cold intolerance and  "heat intolerance.   Genitourinary:  Negative for dysuria, hematuria and urgency.   Musculoskeletal:  Positive for arthralgias, back pain, gait problem, joint swelling and myalgias. Negative for neck pain and neck stiffness.   Skin:  Negative for rash.   Neurological:  Positive for weakness and numbness. Negative for tremors, seizures, speech difficulty, light-headedness and headaches.   Hematological:  Does not bruise/bleed easily.   Psychiatric/Behavioral:  Negative for agitation, confusion and suicidal ideas.          OBJECTIVE:    Telemedicine Physical Exam:   Vitals:    09/20/24 0821   Height: 5' 4" (1.626 m)  Comment: pt reported     Body mass index is 25.73 kg/m².   (reviewed on 9/20/2024)     GENERAL: Well appearing, in no acute distress, alert and oriented x3.  Cooperative.  PSYCH:  Mood and affect appropriate.  SKIN: Skin color & texture with no obvious abnormalities.    HEAD/FACE:  Normocephalic, atraumatic.    PULM:  No difficulty breathing. No nasal flaring. No obvious wheezing.  EXTREMITIES: No obvious deformities. Moving all extremities well, appears to have symmetric strength throughout.  MUSCULOSKELETAL: No obvious atrophy abnormalities are noted.   NEURO: No obvious neurologic deficit.   GAIT: sitting.     Physical Exam: last in clinic visit:  Physical Exam  Constitutional:       General: She is not in acute distress.     Appearance: Normal appearance. She is not ill-appearing.   HENT:      Head: Normocephalic and atraumatic.      Nose: No congestion or rhinorrhea.   Eyes:      Extraocular Movements: Extraocular movements intact.      Pupils: Pupils are equal, round, and reactive to light.   Cardiovascular:      Pulses: Normal pulses.   Pulmonary:      Effort: Pulmonary effort is normal.   Musculoskeletal:      Right hip: Tenderness present. No deformity, lacerations, bony tenderness or crepitus. Decreased range of motion. Decreased strength.      Left hip: Normal.   Skin:     General: Skin is warm " and dry.      Capillary Refill: Capillary refill takes less than 2 seconds.   Neurological:      General: No focal deficit present.      Mental Status: She is alert and oriented to person, place, and time.      Sensory: No sensory deficit.      Motor: Weakness present. No abnormal muscle tone.      Gait: Gait normal.      Deep Tendon Reflexes:      Reflex Scores:       Patellar reflexes are 1+ on the right side and 2+ on the left side.       Achilles reflexes are 1+ on the right side and 2+ on the left side.     Comments: 4/5 strength in right hip adduction in right knee extension   Psychiatric:         Mood and Affect: Mood normal.         Behavior: Behavior normal.         Thought Content: Thought content normal.     Musculoskeletal:  Lumbar Exam  Incision: no  Pain with Flexion: yes  Pain with Extension: yes  ROM:  Decreased  Paraspinous TTP:  Right-greater-than-left  Facet TTP:  L5-S1  Facet Loading:  Negative bilaterally  SLR:  Positive on the right at 80°  SIJ TTP:  Positive on the right  JOHNY:  Negative bilaterally              ASSESSMENT:     54 y.o. year old female with lower back and right lower extremity pain, consistent with right lumbar radiculopathy with possible overlapping hip pathology and sacroiliac joint dysfunction.    1. Right lumbar radiculopathy  Case Request-RAD/Other Procedure Area: Right L4/5 + L5/S1 TF NORAH      2. Arthropathy of right sacroiliac joint        3. DDD (degenerative disc disease), lumbar        4. Localized osteoarthrosis of right hip        5. Vertebrogenic low back pain            Right lumbar radiculopathy  -     Case Request-RAD/Other Procedure Area: Right L4/5 + L5/S1 TF NORAH    Arthropathy of right sacroiliac joint    DDD (degenerative disc disease), lumbar    Localized osteoarthrosis of right hip    Vertebrogenic low back pain        PLAN:   - Interventions:   - Schedule right L4/5 + L5/S1 TF NORAH.  Patient is not taking prescription blood thinners or ASA.      -  Anticoagulation use:   No no anticoagulation    - Medications:  - Refill Lyrica (pregabalin) 50mg QHS - started last visit, which is helping some.   - Continue Mobic 15mg QD PRN.    - [place]  reviewed and appropriate.      - Therapy:   - Continue formal physical therapy -- started last visit, which is helping.   - Patient has completed 6 weeks of physician-led therapy with the last 3 months with mild relief.  Continue home exercises.    - Imaging/Diagnostic:  - Lumbar x-ray reviewed: shows facet arthropathy.   - 08/19/2024 MRI Lumbar reviewed: Multilevel lower lumbar spine primarily facet degenerative findings without high-grade spinal canal stenosis or neural foraminal narrowing.  - x-rays of right hip reveal no osseous abnormality  - 7/9/2024 BLE EMG/NCS: mild subacute on chronic right L5 radiculopathy    - Consults:   - Continue follow up with Dr. Barker (Orthopedics) for right hip pain PRN:       - Follow up visit: 4 weeks post-procedure - virtual visit - Cheryl Richardson PA-C       Future Appointments   Date Time Provider Department Center   9/23/2024 11:00 AM Adriana Zapien, PT [facility] [place]   9/25/2024 11:00 AM Adriana Zapien, PT [facility] [place]   11/7/2024  8:40 AM JEFF Simmons MD [facility]       - Patient Questions: Answered all of the patient's questions regarding diagnosis, therapy, and treatment.    - This condition does not require this patient to take time off of work, and the primary goal of our Pain Management services is to improve the patient's functional capacity.   - I discussed the risks, benefits, and alternatives to potential treatment options. All questions and concerns were fully addressed today in clinic.         Cheryl Richardson PA-C  Interventional Pain Management - [facility]    Disclaimer:  This note was prepared using voice recognition system and is likely to have sound alike errors that may have been overlooked even after proof reading.   Please call me with any questions.

## 2024-09-20 ENCOUNTER — OFFICE VISIT (OUTPATIENT)
Dept: PAIN MEDICINE | Facility: CLINIC | Age: 54
End: 2024-09-20
Payer: COMMERCIAL

## 2024-09-20 VITALS — HEIGHT: 64 IN | BODY MASS INDEX: 25.73 KG/M2

## 2024-09-20 DIAGNOSIS — M54.51 VERTEBROGENIC LOW BACK PAIN: ICD-10-CM

## 2024-09-20 DIAGNOSIS — M54.16 RIGHT LUMBAR RADICULOPATHY: Primary | ICD-10-CM

## 2024-09-20 DIAGNOSIS — M16.11 LOCALIZED OSTEOARTHROSIS OF RIGHT HIP: ICD-10-CM

## 2024-09-20 DIAGNOSIS — M51.36 DDD (DEGENERATIVE DISC DISEASE), LUMBAR: ICD-10-CM

## 2024-09-20 DIAGNOSIS — M47.818 ARTHROPATHY OF RIGHT SACROILIAC JOINT: ICD-10-CM

## 2024-09-20 NOTE — PATIENT INSTRUCTIONS
Here are some brief explanations of conditions on MRI:    When the disc bulge is large enough and herniates out toward the spinal canal, it puts pressure on the sensitive spinal nerves, causing pain - including lumbar radiculopathy/ sciatica, a sharp, often shooting pain that extends from the buttocks down the back of one leg [or cervical radiculopathy when shooting from the neck into the arm]. Numbness, tingling, and weakness in one extremity is also common.  If the herniated disc is not pressing on a nerve, the patient may experience a neck or back ache or even no pain at all.       Spinal stenosis is a narrowing of the spaces within your spine, which can put pressure on the nerves that travel through the spine.  - neural foraminal stenosis (area on the sides where nerves come out to come together to form large nerve that goes down your arm or leg) - can cause leg pain [or neck pain] as well.                   Facet joint syndrome is an arthritis-like condition of the spine that can be a significant source of back and neck pain. It is caused by degenerative changes to the joints between the spine bones. The cartilage inside the facet joint can break down and become inflamed, triggering pain signals in nearby nerve endings.          Modic changes - Modic changes (also known as degenerative endplate changes) are of unclear clinical significance. They refer to specific signal changes in the adjacent bone marrow on a spine MRI.  Modic changes in the lumbar spine are not associated with clinically significant axial low back pain severity or patient disability. The prevalence of Modic changes increases with age and appears to be associated with degenerative disc changes.        ______________________________________________________________________

## 2024-09-24 ENCOUNTER — PATIENT MESSAGE (OUTPATIENT)
Dept: PAIN MEDICINE | Facility: CLINIC | Age: 54
End: 2024-09-24
Payer: COMMERCIAL

## 2024-09-24 ENCOUNTER — TELEPHONE (OUTPATIENT)
Dept: PAIN MEDICINE | Facility: CLINIC | Age: 54
End: 2024-09-24
Payer: COMMERCIAL

## 2024-09-24 NOTE — TELEPHONE ENCOUNTER
----- Message from Cheryl Richardson PA-C sent at 9/20/2024  8:32 AM CDT -----  Pain Provider: Duncan    Case: right L4/5 + L5/S1 TF NORAH     Anticoagulation: none   4 weeks post-procedure - virtual visit - Cheryl Richardson PA-C   [facility]     Insurance: [facility]

## 2024-09-24 NOTE — TELEPHONE ENCOUNTER
Call to schedule pt injection with Dr Song, pt was scheduled for 10/10.    .Anel Amaya [facility]

## 2024-09-27 ENCOUNTER — OFFICE VISIT (OUTPATIENT)
Dept: INTERNAL MEDICINE | Facility: CLINIC | Age: 54
End: 2024-09-27
Payer: COMMERCIAL

## 2024-09-27 VITALS
WEIGHT: 149.5 LBS | HEART RATE: 93 BPM | TEMPERATURE: 98 F | RESPIRATION RATE: 18 BRPM | BODY MASS INDEX: 25.52 KG/M2 | HEIGHT: 64 IN | OXYGEN SATURATION: 100 % | DIASTOLIC BLOOD PRESSURE: 74 MMHG | SYSTOLIC BLOOD PRESSURE: 130 MMHG

## 2024-09-27 DIAGNOSIS — F51.04 PSYCHOPHYSIOLOGIC INSOMNIA: Chronic | ICD-10-CM

## 2024-09-27 DIAGNOSIS — N95.1 MENOPAUSAL SYNDROME (HOT FLASHES): ICD-10-CM

## 2024-09-27 DIAGNOSIS — F41.9 ANXIETY: Primary | Chronic | ICD-10-CM

## 2024-09-27 DIAGNOSIS — J45.20 MILD INTERMITTENT ASTHMA WITHOUT COMPLICATION: Chronic | ICD-10-CM

## 2024-09-27 DIAGNOSIS — R73.03 PREDIABETES: Chronic | ICD-10-CM

## 2024-09-27 DIAGNOSIS — Z78.0 ASYMPTOMATIC MENOPAUSAL STATE: ICD-10-CM

## 2024-09-27 DIAGNOSIS — E04.2 MULTIPLE THYROID NODULES: Chronic | ICD-10-CM

## 2024-09-27 DIAGNOSIS — J30.1 SEASONAL ALLERGIC RHINITIS DUE TO POLLEN: ICD-10-CM

## 2024-09-27 DIAGNOSIS — Z00.00 PREVENTATIVE HEALTH CARE: ICD-10-CM

## 2024-09-27 DIAGNOSIS — Z01.419 PAP SMEAR, AS PART OF ROUTINE GYNECOLOGICAL EXAMINATION: ICD-10-CM

## 2024-09-27 PROBLEM — Z12.11 COLON CANCER SCREENING: Status: RESOLVED | Noted: 2024-04-08 | Resolved: 2024-09-27

## 2024-09-27 PROCEDURE — 99214 OFFICE O/P EST MOD 30 MIN: CPT | Mod: S$GLB,,, | Performed by: FAMILY MEDICINE

## 2024-09-27 PROCEDURE — G2211 COMPLEX E/M VISIT ADD ON: HCPCS | Mod: S$GLB,,, | Performed by: FAMILY MEDICINE

## 2024-09-27 PROCEDURE — 1159F MED LIST DOCD IN RCRD: CPT | Mod: CPTII,S$GLB,, | Performed by: FAMILY MEDICINE

## 2024-09-27 PROCEDURE — 99999 PR PBB SHADOW E&M-EST. PATIENT-LVL V: CPT | Mod: PBBFAC,,, | Performed by: FAMILY MEDICINE

## 2024-09-27 PROCEDURE — 3078F DIAST BP <80 MM HG: CPT | Mod: CPTII,S$GLB,, | Performed by: FAMILY MEDICINE

## 2024-09-27 PROCEDURE — 3008F BODY MASS INDEX DOCD: CPT | Mod: CPTII,S$GLB,, | Performed by: FAMILY MEDICINE

## 2024-09-27 PROCEDURE — 3075F SYST BP GE 130 - 139MM HG: CPT | Mod: CPTII,S$GLB,, | Performed by: FAMILY MEDICINE

## 2024-09-27 PROCEDURE — 3044F HG A1C LEVEL LT 7.0%: CPT | Mod: CPTII,S$GLB,, | Performed by: FAMILY MEDICINE

## 2024-09-27 PROCEDURE — 1160F RVW MEDS BY RX/DR IN RCRD: CPT | Mod: CPTII,S$GLB,, | Performed by: FAMILY MEDICINE

## 2024-09-27 RX ORDER — BUSPIRONE HYDROCHLORIDE 15 MG/1
15 TABLET ORAL 3 TIMES DAILY
Qty: 270 TABLET | Refills: 3 | Status: SHIPPED | OUTPATIENT
Start: 2024-09-27

## 2024-09-27 RX ORDER — FLUTICASONE PROPIONATE 50 MCG
2 SPRAY, SUSPENSION (ML) NASAL DAILY
Qty: 16 G | Refills: 11 | Status: SHIPPED | OUTPATIENT
Start: 2024-09-27

## 2024-09-27 RX ORDER — MINERAL OIL
180 ENEMA (ML) RECTAL DAILY
COMMUNITY
Start: 2024-09-27

## 2024-09-27 RX ORDER — PREDNISONE 5 MG/1
TABLET ORAL
Qty: 20 TABLET | Refills: 0 | Status: SHIPPED | OUTPATIENT
Start: 2024-09-27 | End: 2024-10-11

## 2024-09-27 RX ORDER — MULTIVITAMIN
1 TABLET ORAL 2 TIMES DAILY
COMMUNITY
Start: 2024-09-27 | End: 2026-02-09

## 2024-09-27 RX ORDER — KETOROLAC TROMETHAMINE 10 MG/1
10 TABLET, FILM COATED ORAL 3 TIMES DAILY
COMMUNITY
Start: 2024-09-23

## 2024-09-27 RX ORDER — ALBUTEROL SULFATE 90 UG/1
2 INHALANT RESPIRATORY (INHALATION) EVERY 6 HOURS PRN
Qty: 8.5 G | Refills: 5 | Status: SHIPPED | OUTPATIENT
Start: 2024-09-27

## 2024-09-27 RX ORDER — ZOLPIDEM TARTRATE 5 MG/1
5 TABLET ORAL NIGHTLY PRN
Qty: 90 TABLET | Refills: 1 | Status: SHIPPED | OUTPATIENT
Start: 2024-09-27
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
## 2024-09-30 NOTE — PRE-PROCEDURE INSTRUCTIONS
Spoke with patient regarding procedure scheduled on 10.10    Arrival time 1130     Has patient been sick with fever or on antibiotics within the last 7 days? No     Does the patient have any open wounds, sores or rashes? No     Does the patient have any recent fractures? no     Has patient received a vaccination within the last 7 days? No     Received the COVID vaccination? yes     Has the patient stopped all medications as directed? na     Does patient have a pacemaker, defibrillator, or implantable stimulator? No     Does the patient have a ride to and from procedure and someone reliable to remain with patient?  son or daughter      Is the patient diabetic? no     Does the patient have sleep apnea? Or use O2 at home? no     Is the patient receiving sedation? Yes      Is the patient instructed to remain NPO beginning at midnight the night before their procedure? yes     Procedure location confirmed with patient? Yes     Covid- Denies signs/symptoms. Instructed to notify PAT/MD if any changes.

## 2024-10-10 ENCOUNTER — HOSPITAL ENCOUNTER (OUTPATIENT)
Facility: HOSPITAL | Age: 54
Discharge: HOME | End: 2024-10-10
Attending: ANESTHESIOLOGY | Admitting: ANESTHESIOLOGY
Payer: COMMERCIAL

## 2024-10-10 VITALS
SYSTOLIC BLOOD PRESSURE: 135 MMHG | BODY MASS INDEX: 25.21 KG/M2 | RESPIRATION RATE: 14 BRPM | HEART RATE: 81 BPM | HEIGHT: 64 IN | OXYGEN SATURATION: 100 % | DIASTOLIC BLOOD PRESSURE: 93 MMHG | WEIGHT: 147.69 LBS | TEMPERATURE: 98 F

## 2024-10-10 DIAGNOSIS — M54.16 LUMBAR RADICULOPATHY: Primary | ICD-10-CM

## 2024-10-10 PROCEDURE — 64484 NJX AA&/STRD TFRM EPI L/S EA: CPT | Mod: RT | Performed by: ANESTHESIOLOGY

## 2024-10-10 PROCEDURE — 63600175 PHARM REV CODE 636 W HCPCS: Performed by: ANESTHESIOLOGY

## 2024-10-10 PROCEDURE — 64483 NJX AA&/STRD TFRM EPI L/S 1: CPT | Mod: RT,,, | Performed by: ANESTHESIOLOGY

## 2024-10-10 PROCEDURE — 25500020 PHARM REV CODE 255: Performed by: ANESTHESIOLOGY

## 2024-10-10 PROCEDURE — 64483 NJX AA&/STRD TFRM EPI L/S 1: CPT | Mod: RT | Performed by: ANESTHESIOLOGY

## 2024-10-10 PROCEDURE — 64484 NJX AA&/STRD TFRM EPI L/S EA: CPT | Mod: RT,,, | Performed by: ANESTHESIOLOGY

## 2024-10-10 RX ORDER — LIDOCAINE HYDROCHLORIDE 10 MG/ML
INJECTION, SOLUTION EPIDURAL; INFILTRATION; INTRACAUDAL; PERINEURAL
Status: DISCONTINUED | OUTPATIENT
Start: 2024-10-10 | End: 2024-10-10 | Stop reason: HOSPADM

## 2024-10-10 RX ORDER — ONDANSETRON HYDROCHLORIDE 2 MG/ML
4 INJECTION, SOLUTION INTRAVENOUS ONCE AS NEEDED
Status: DISCONTINUED | OUTPATIENT
Start: 2024-10-10 | End: 2024-10-10 | Stop reason: HOSPADM

## 2024-10-10 RX ORDER — BETAMETHASONE SODIUM PHOSPHATE AND BETAMETHASONE ACETATE 3; 3 MG/ML; MG/ML
INJECTION, SUSPENSION INTRA-ARTICULAR; INTRALESIONAL; INTRAMUSCULAR; SOFT TISSUE
Status: DISCONTINUED | OUTPATIENT
Start: 2024-10-10 | End: 2024-10-10 | Stop reason: HOSPADM

## 2024-10-10 RX ORDER — MIDAZOLAM HYDROCHLORIDE 1 MG/ML
INJECTION INTRAMUSCULAR; INTRAVENOUS
Status: DISCONTINUED | OUTPATIENT
Start: 2024-10-10 | End: 2024-10-10 | Stop reason: HOSPADM

## 2024-10-10 RX ORDER — FENTANYL CITRATE 50 UG/ML
INJECTION, SOLUTION INTRAMUSCULAR; INTRAVENOUS
Status: DISCONTINUED | OUTPATIENT
Start: 2024-10-10 | End: 2024-10-10 | Stop reason: HOSPADM

## 2024-10-10 NOTE — DISCHARGE SUMMARY
Discharge Note  Short Stay      SUMMARY     Admit Date: 10/10/2024    Attending Physician: Leander Song MD        Discharge Physician: Leander Song MD        Discharge Date: 10/10/2024 12:37 PM    Procedure(s) (LRB):  Right L4/5 + L5/S1 TF NORAH (Right)    Final Diagnosis: Right lumbar radiculopathy [M54.16]    Disposition: Home or self care    Patient Instructions:   Current Discharge Medication List        CONTINUE these medications which have NOT CHANGED    Details   albuterol (PROAIR HFA) 90 mcg/actuation inhaler Inhale 2 puffs into the lungs every 6 (six) hours as needed for Wheezing or Shortness of Breath (or Cough).  Qty: 8.5 g, Refills: 5    Comments: -  Associated Diagnoses: Mild intermittent asthma without complication      fexofenadine (ALLEGRA) 180 MG tablet Take 1 tablet (180 mg total) by mouth once daily. Do not take cetirizine (Zyrtec) and fexofenadine (Allegra) on the same day.    Associated Diagnoses: Seasonal allergic rhinitis due to pollen      ketorolac (TORADOL) 10 mg tablet Take 10 mg by mouth 3 (three) times daily.      meloxicam (MOBIC) 15 MG tablet Take 1 tablet (15 mg total) by mouth daily as needed (for musculoskeletal pain).  Qty: 30 tablet, Refills: 1    Associated Diagnoses: Chronic right hip pain      predniSONE (DELTASONE) 5 MG tablet Take 4 tablets by mouth once daily for 2 days, THEN 3 tablets once daily for 2 days, THEN 2 tablets once daily for 2 days, THEN 1 tablet daily for 2 days.  Qty: 20 tablet, Refills: 0    Associated Diagnoses: Mild intermittent asthma without complication      pregabalin (LYRICA) 50 MG capsule Take 1 capsule (50 mg total) by mouth every evening.  Qty: 30 capsule, Refills: 1    Associated Diagnoses: Lumbar radiculopathy; DDD (degenerative disc disease), lumbar; Lumbar spondylosis      zolpidem (AMBIEN) 5 MG Tab Take 1 tablet (5 mg total) by mouth nightly as needed (for insomnia).  Qty: 90 tablet, Refills: 1    Associated Diagnoses: Psychophysiologic  insomnia      busPIRone (BUSPAR) 15 MG tablet Take 1 tablet (15 mg total) by mouth 3 (three) times daily.  Qty: 270 tablet, Refills: 3    Associated Diagnoses: Anxiety      calcium-vitamin D (CALCIUM WITH VITAMIN D) 600 mg-10 mcg (400 unit) Tab Take 1 tablet by mouth 2 (two) times a day. for 999 doses    Associated Diagnoses: Asymptomatic menopausal state      fluticasone propionate (FLONASE) 50 mcg/actuation nasal spray Use 2 sprays (100 mcg total) in each nostril once daily.  Qty: 16 g, Refills: 11    Comments: If insurance does not cover, please offer OTC equivalent. OK to vary dispense quantity to correspond with in-stock OTC unit quantities.  Associated Diagnoses: Seasonal allergic rhinitis due to pollen      multivitamin-Ca-iron-minerals Tab Take 1 tablet by mouth once daily.    Associated Diagnoses: Asymptomatic menopausal state; Preventative health care                 Discharge Diagnosis: Right lumbar radiculopathy [M54.16]  Condition on Discharge: Stable with no complications to procedure   Diet on Discharge: Same as before.  Activity: as per instruction sheet.  Discharge to: Home with a responsible adult.  Follow up: 2-4 weeks       Please call the office at [phone] if you experience any weakness or loss of sensation, fever > 101.5, pain uncontrolled with oral medications, persistent nausea/vomiting/or diarrhea, redness or drainage from the incisions, or any other worrisome concerns. If physician on call was not reached or could not communicate with our office for any reason please go to the nearest emergency department

## 2024-10-10 NOTE — H&P
HPI  Patient presenting for Procedure(s) (LRB):  Right L4/5 + L5/S1 TF NORAH (Right)     Patient on Anti-coagulation No    No health changes since previous encounter    Past Medical History:   Diagnosis Date    Acne     Anemia     Herpes simplex type II infection     History of COVID-19 (12/23/2020) 03/25/2021    History of partial thyroidectomy 03/25/2021    Mild intermittent asthma without complication     Multinodular goiter     Multiple thyroid nodules 03/25/2021    US Soft Tissue Head Neck Thyroid Narrative: EXAMINATION: US SOFT TISSUE HEAD NECK THYROID  CLINICAL HISTORY: Nontoxic multinodular goiter  TECHNIQUE: Ultrasound of the thyroid and cervical lymph nodes was performed.  COMPARISON: 11/04/2014  FINDINGS: The patient is status post right hemithyroidectomy.  Right thyroid fossa appears unremarkable.  The left lobe is large in size measuring 5.9 x 2.0 x     Prediabetes 12/03/2023    Seasonal allergic rhinitis due to pollen      Past Surgical History:   Procedure Laterality Date    COLONOSCOPY N/A 4/8/2024    Procedure: COLONOSCOPY;  Surgeon: Gurwinder Llanos MD;  Location: [facility];  Service: Endoscopy;  Laterality: N/A;    FNA thyroid nodule  04/2014    HERNIA REPAIR      umbilical     THYROIDECTOMY, PARTIAL  02/2015    complicated by post-op hematoma    uterine ablation  12/27/2017     Review of patient's allergies indicates:   Allergen Reactions    Adhesive Rash     bandaids    Neosporin [neomycin-bacitracin-polymyxin] Rash        No current facility-administered medications on file prior to encounter.     Current Outpatient Medications on File Prior to Encounter   Medication Sig Dispense Refill    meloxicam (MOBIC) 15 MG tablet Take 1 tablet (15 mg total) by mouth daily as needed (for musculoskeletal pain). 30 tablet 1    pregabalin (LYRICA) 50 MG capsule Take 1 capsule (50 mg total) by mouth every evening. 30 capsule 1        PMHx, PSHx, Allergies, Medications reviewed in epic    ROS negative  "except pain complaints in HPI    OBJECTIVE:    BP (!) 160/78   Pulse 64   Temp 98 °F (36.7 °C) (Temporal)   Resp 18   Ht 5' 4" (1.626 m)   Wt 67 kg (147 lb 11.3 oz)   LMP 11/05/2021   Breastfeeding No   BMI 25.35 kg/m²     PHYSICAL EXAMINATION:    GENERAL: Well appearing, in no acute distress, alert and oriented x3.  PSYCH:  Mood and affect appropriate.  SKIN: Skin color, texture, turgor normal, no rashes or lesions which will impact the procedure.  CV: RRR with palpation of the radial artery.  PULM: No evidence of respiratory difficulty, symmetric chest rise. Clear to auscultation.  NEURO: Cranial nerves grossly intact.    Plan:    Proceed with procedure as planned Procedure(s) (LRB):  Right L4/5 + L5/S1 TF NORAH (Right)    Leander Song MD  10/10/2024            "

## 2024-10-10 NOTE — DISCHARGE INSTRUCTIONS

## 2024-10-10 NOTE — OP NOTE
Transforaminal Lumbar Epidural Steroid Injection    INFORMED CONSENT: The procedure, risks, benefits and options were discussed with patient. There are no contraindications to the procedure. The patient expressed understanding and agreed to proceed. The personnel performing the procedure was discussed.    Date of procedure 10/10/2024    Time-out taken to identify patient and procedure side prior to starting the procedure.                     PROCEDURE:    1)  Right  L4/L5 TRANSFORAMINAL EPIDURAL STEROID INJECTION    2)  Right  L5/S1 TRANSFORAMINAL EPIDURAL STEROID INJECTION    Pre Procedure diagnosis:    Right lumbar radiculopathy [M54.16]  1. Lumbar radiculopathy        Post-Procedure diagnosis:   same    Surgeon: Leander Song MD    Assistants: None    Medication: 2ml Betamethasone PF 6mg/ml and 2ml Lidocaine PF 1%    Local: 3ml Lidocaine PF 1%    Sedation: Conscious sedation provided by M.D    SEDATION MEDICATIONS: local/IV sedation: Versed 2 mg and fentanyl 75 mcg IV.  Conscious sedation ordered by MD.  Patient reevaluated and sedation administered by MD and monitored by RN.  Total sedation time was less than 10 min.    Total sedation time was <10 min    Complications: None    Specimens: None        TECHNIQUE: The patient was brought to the procedure room. IV access was obtained prior to the procedure. The patient was positioned prone on the fluoroscopy table. Continuous hemodynamic monitoring was initiated including blood pressure, EKG, and pulse oximetry. . The skin was prepped with chlorhexidine and draped in a sterile fashion.   Local Xylocaine was injected by raising a wheel and going down to the periosteum using a 27-gauge hypodermic needle.      The Right  L4/L5 and Right L5/S1 transforaminal spaces were identified with fluoroscopy in the  AP, oblique, and lateral views.  A 22 gauge spinal quinke needle was then advanced into the area of the trans foraminal spaces at the above levels with confirmation  of proper needle position using AP, oblique, and lateral fluoroscopic views. Once the needle tip was in the area of the transforaminal space, and there was no blood, CSF or paraesthesias,  2 mL of Omnipaque 300mg/ml was injected at each level for a total of 4 mL.  Fluoroscopic imaging in the AP and lateral views revealed a clear outline of the spinal nerve with proximal spread of agent through the neural foramen into the epidural space. A total combination of 1 mL of Lidocaine PF 1% and 1ml of Betamethasone PF 6mg/ml was injected into each level for a total of 4mL of injected medications with displacement of the contrast dye confirming that the medication went into the area of the transforaminal spaces at each level. A sterile dressing was applied. Patient tolerated procedure well.        The patient was monitored for approximately 30 minutes after the procedure.  Patient was given post procedure and discharge instructions to follow at home.  The patient was discharged in a stable condition

## 2024-10-24 ENCOUNTER — PATIENT MESSAGE (OUTPATIENT)
Dept: INTERNAL MEDICINE | Facility: CLINIC | Age: 54
End: 2024-10-24
Payer: COMMERCIAL

## 2024-11-13 NOTE — PROGRESS NOTES
Established Patient - TeleHealth Visit    The patient location is: [place]  The chief complaint leading to consultation is: chronic pain     Visit type: audiovisual    Face to Face time with patient: 10-15 minutes  20 minutes of total time spent on the encounter, which includes face to face time and non-face to face time preparing to see the patient (eg, review of tests), Obtaining and/or reviewing separately obtained history, Documenting clinical information in the electronic or other health record, Independently interpreting results (not separately reported) and communicating results to the patient/family/caregiver, or Care coordination (not separately reported).     Each patient to whom he or she provides medical services by telemedicine is:  (1) informed of the relationship between the physician and patient and the respective role of any other health care provider with respect to management of the patient; and (2) notified that he or she may decline to receive medical services by telemedicine and may withdraw from such care at any time.        Chronic Pain -- Established Patient (Follow-up visit)  Referring Physician: Hector Barker MD    PCP: JEFF Simmons MD      Chief complaint:  Follow-up       C/o low back pain with RLE radicular pain -pain worse at night       SUBJECTIVE:    Interval History (11/14/2024): Liana Keane presents today for follow-up visit.  she underwent right L4/5 + L5/S1 TF NORAH on 10/10/24.  The patient reports that she is/was better following the procedure.  she reports 90% pain relief.  The changes lasted 4 weeks so far.  The changes have continued through this visit.  Patient reports pain as 1/10 today.  Nighttime pain has also improved.  Overall, she is doing much better than she was prior to the injection.    Interval History (9/20/2024):  Patient presents today for follow-up visit to review lumbar MRI. At that visit, the plan was to start Lyrica at night and order formal  "physical therapy. Patient reports pain as "0/10 in the morning, 10/10 at night when going to bed. Pain is also worse with activity.  She has been taking the Lyrica at night, which is helping some.  She has also been attending physical therapy, which is also helping.    Initial HPI (08/13/2024) - Dr. Song:  Liana Keane is a 53 y.o. female who presents to the clinic for the evaluation of lower back pain.   Patient reports 2 month history of lower back pain.  Patient denies any inciting traumas to her lower back pain.  Patient denies any previous surgeries on her lumbar spine or major joints.  Pain is described as a throbbing aching pain in the lower back in the right lower extremity.  Patient reports that most painful location is the right anterior thigh that is described as a burning pain.  Patient reports that lower back pain does seem to radiate to her right anterior thigh.  Patient reports occasional radiation to her left lower extremity, but reports that her right-sided pain is her primary pain.  Pain is worse when we up in the morning and lying on her right side, better with rest.  Pain is currently rated a 2/10, but can increase to an 8/10 with exacerbating activity.  Patient denies any fevers, chills, saddle anesthesia, or bowel and bladder incontinence.      Non-Pharmacologic Treatments:  Physical Therapy/Home Exercise: yes  Ice/Heat:yes  TENS: no  Acupuncture: no  Massage: yes  Chiropractic: no        Previous Pain Medications:  Tylenol, meloxicam, ibuprofen, Flexeril, topicals      Pain Procedures:   - 10/10/24: right L4/5 + L5/S1 TF NORAH, 90% pain relief          Pain Disability Index Review:       No data to display                    Imaging/ Diagnostic Studies/ Labs (Reviewed on 11/14/2024):    08/19/2024 MRI Lumbar Spine Without Contrast  COMPARISON: 08/13/2024 radiograph    FINDINGS:  Vertebral body heights maintained.  No spondylolisthesis.  Mild Modic type 1 endplate changes of the L5 " inferior endplate.  Mild perifacet edematous changes at the L4-5 and L5-S1 level.  Multilevel early intervertebral disc desiccation changes without significant height loss.    Conus terminates normally.  Visualized intra-abdominal/pelvic structures unremarkable.    L1-L2: No spinal canal stenosis or neural foraminal narrowing.    L2-L3: No spinal canal stenosis or neural foraminal narrowing.    L3-L4: Minimal circumferential disc bulge without spinal canal stenosis or significant neural foraminal narrowing.  Mild-to-moderate facet arthropathy.    L4-L5: Mild circumferential disc bulging without spinal canal stenosis.  Moderate facet arthropathy contribute to mild bilateral inferior neural foraminal narrowing at L4-5.    L5-S1: Mild circumferential disc bulge without spinal canal stenosis.  Mild-to-moderate facet arthropathy causing minimal bilateral inferior neural foraminal narrowing at L5-S1.  Impression:   Multilevel lower lumbar spine primarily facet degenerative findings without high-grade spinal canal stenosis or neural foraminal narrowing.      08/13/2024 X-Ray Lumbar Complete Including Flex And Ext  COMPARISON: None  FINDINGS:  Seven views of the lumbar spine were obtained.  No evidence of acute fracture or dislocation.  Bony mineralization is normal.  Soft tissues are unremarkable.   Mild spondylosis throughout the visualized thoracolumbar spine.  Mild facet arthropathy lower lumbar spine.  Normal alignment on flexion extension views.  Impression  No acute abnormalities        06/13/24 X-Ray Hip 2 or 3 views Right with Pelvis when performed  FINDINGS:  No pelvic fracture.  No fracture dislocation or degenerative change of the right hip.  No evidence for avascular necrosis of the right femoral head.  Impression  Normal pelvis and right hip x-ray.      7/9/2024 BLE EMG/NCS   ABNORMAL study  2. There is electrodiagnostic evidence of a mild subacute on chronic radiculopathy of the right L5 nerve  "root                Review of Systems:   Constitutional:  Negative for chills, diaphoresis, fatigue and fever.   HENT:  Negative for ear discharge, ear pain, rhinorrhea, trouble swallowing and voice change.    Respiratory:  Negative for chest tightness, shortness of breath, wheezing and stridor.    Cardiovascular:  Negative for chest pain and leg swelling.   Gastrointestinal:  Negative for blood in stool, diarrhea, nausea and vomiting.   Endocrine: Negative for cold intolerance and heat intolerance.   Genitourinary:  Negative for dysuria, hematuria and urgency.   Musculoskeletal:  Positive for arthralgias, back pain, gait problem, joint swelling and myalgias. Negative for neck pain and neck stiffness.   Skin:  Negative for rash.   Neurological:  Positive for weakness and numbness. Negative for tremors, seizures, speech difficulty, light-headedness and headaches.   Hematological:  Does not bruise/bleed easily.   Psychiatric/Behavioral:  Negative for agitation, confusion and suicidal ideas.          OBJECTIVE:    Telemedicine Physical Exam:   Vitals:    11/14/24 0731   Weight: 67 kg (147 lb 11.3 oz)  Comment: PT REPORTED   Height: 5' 4" (1.626 m)  Comment: PT REPORTED      Body mass index is 25.35 kg/m².   (reviewed on 11/14/2024)     GENERAL: Well appearing, in no acute distress, alert and oriented x3.    PSYCH:  Mood and affect appropriate.  SKIN: Skin color & texture with no obvious abnormalities.    HEAD/FACE:  Normocephalic, atraumatic.    PULM:  No difficulty breathing. No nasal flaring. No obvious wheezing.  EXTREMITIES: No obvious deformities.   MUSCULOSKELETAL: No obvious atrophy abnormalities are noted.   GAIT: sitting.     Physical Exam: last in clinic visit:  Physical Exam  Constitutional:       General: She is not in acute distress.     Appearance: Normal appearance. She is not ill-appearing.   HENT:      Head: Normocephalic and atraumatic.      Nose: No congestion or rhinorrhea.   Eyes:      Extraocular " Movements: Extraocular movements intact.      Pupils: Pupils are equal, round, and reactive to light.   Cardiovascular:      Pulses: Normal pulses.   Pulmonary:      Effort: Pulmonary effort is normal.   Musculoskeletal:      Right hip: Tenderness present. No deformity, lacerations, bony tenderness or crepitus. Decreased range of motion. Decreased strength.      Left hip: Normal.   Skin:     General: Skin is warm and dry.      Capillary Refill: Capillary refill takes less than 2 seconds.   Neurological:      General: No focal deficit present.      Mental Status: She is alert and oriented to person, place, and time.      Sensory: No sensory deficit.      Motor: Weakness present. No abnormal muscle tone.      Gait: Gait normal.      Deep Tendon Reflexes:      Reflex Scores:       Patellar reflexes are 1+ on the right side and 2+ on the left side.       Achilles reflexes are 1+ on the right side and 2+ on the left side.     Comments: 4/5 strength in right hip adduction in right knee extension   Psychiatric:         Mood and Affect: Mood normal.         Behavior: Behavior normal.         Thought Content: Thought content normal.     Musculoskeletal:  Lumbar Exam  Incision: no  Pain with Flexion: yes  Pain with Extension: yes  ROM:  Decreased  Paraspinous TTP:  Right-greater-than-left  Facet TTP:  L5-S1  Facet Loading:  Negative bilaterally  SLR:  Positive on the right at 80°  SIJ TTP:  Positive on the right  JOHNY:  Negative bilaterally              ASSESSMENT:     54 y.o. year old female with lower back and right lower extremity pain, consistent with right lumbar radiculopathy with possible overlapping hip pathology and sacroiliac joint dysfunction.    1. Right lumbar radiculopathy        2. Arthropathy of right sacroiliac joint        3. Localized osteoarthrosis of right hip        4. Vertebrogenic low back pain        5. Lumbar spondylosis              Right lumbar radiculopathy    Arthropathy of right sacroiliac  joint    Localized osteoarthrosis of right hip    Vertebrogenic low back pain    Lumbar spondylosis          PLAN:   - Interventions:   - S/p right L4/5 + L5/S1 TF NORAH on 10/10/24 with 90% pain relief.    - Anticoagulation use:   No no anticoagulation    - Medications:  - Refill Lyrica (pregabalin) 50mg QHS PRN.  - Continue Mobic 15mg QD PRN.    - [place]  reviewed and appropriate.      - Therapy:   - Continue PT-directed at home exercises learned from physical therapy/ ambulation/ stretching to help manage the patient/s painful condition.  She has completed formal physical therapy.    - Imaging/Diagnostic:  - 08/19/2024 MRI Lumbar reviewed: Multilevel lower lumbar spine primarily facet degenerative findings without high-grade spinal canal stenosis or neural foraminal narrowing.  - x-rays of right hip reveal no osseous abnormality  - 7/9/2024 BLE EMG/NCS: mild subacute on chronic right L5 radiculopathy    - Consults:   - Continue follow up with Dr. Barker (Orthopedics) for right hip pain PRN:       - Follow up visit: 3 months follow-up - discuss repeat NORAH if needed      Future Appointments   Date Time Provider Department Center   12/3/2024  3:45 PM Rik Francisco MD [facility] OBGYN [place]   12/5/2024  4:20 PM JEFF Simmons MD [facility]  [place]   2/14/2025  8:20 AM Cheryl Richardson PA-C [facility]       - Patient Questions: Answered all of the patient's questions regarding diagnosis, therapy, and treatment.    - This condition does not require this patient to take time off of work, and the primary goal of our Pain Management services is to improve the patient's functional capacity.   - I discussed the risks, benefits, and alternatives to potential treatment options. All questions and concerns were fully addressed today in clinic.         Cheryl Richardson PA-C  Interventional Pain Management - [facility]    Disclaimer:  This note was prepared using voice recognition system and is  likely to have sound alike errors that may have been overlooked even after proof reading.  Please call me with any questions.

## 2024-11-14 ENCOUNTER — PATIENT MESSAGE (OUTPATIENT)
Dept: PAIN MEDICINE | Facility: CLINIC | Age: 54
End: 2024-11-14

## 2024-11-14 ENCOUNTER — OFFICE VISIT (OUTPATIENT)
Dept: PAIN MEDICINE | Facility: CLINIC | Age: 54
End: 2024-11-14
Payer: COMMERCIAL

## 2024-11-14 VITALS — HEIGHT: 64 IN | BODY MASS INDEX: 25.21 KG/M2 | WEIGHT: 147.69 LBS

## 2024-11-14 DIAGNOSIS — M16.11 LOCALIZED OSTEOARTHROSIS OF RIGHT HIP: ICD-10-CM

## 2024-11-14 DIAGNOSIS — M47.818 ARTHROPATHY OF RIGHT SACROILIAC JOINT: ICD-10-CM

## 2024-11-14 DIAGNOSIS — M54.51 VERTEBROGENIC LOW BACK PAIN: ICD-10-CM

## 2024-11-14 DIAGNOSIS — M47.816 LUMBAR SPONDYLOSIS: ICD-10-CM

## 2024-11-14 DIAGNOSIS — M54.16 RIGHT LUMBAR RADICULOPATHY: Primary | ICD-10-CM

## 2024-11-14 PROCEDURE — 3044F HG A1C LEVEL LT 7.0%: CPT | Mod: CPTII,95,, | Performed by: PHYSICIAN ASSISTANT

## 2024-11-14 PROCEDURE — 1160F RVW MEDS BY RX/DR IN RCRD: CPT | Mod: CPTII,95,, | Performed by: PHYSICIAN ASSISTANT

## 2024-11-14 PROCEDURE — 3008F BODY MASS INDEX DOCD: CPT | Mod: CPTII,95,, | Performed by: PHYSICIAN ASSISTANT

## 2024-11-14 PROCEDURE — 1159F MED LIST DOCD IN RCRD: CPT | Mod: CPTII,95,, | Performed by: PHYSICIAN ASSISTANT

## 2024-11-14 PROCEDURE — 99214 OFFICE O/P EST MOD 30 MIN: CPT | Mod: 95,,, | Performed by: PHYSICIAN ASSISTANT

## 2024-12-05 ENCOUNTER — OFFICE VISIT (OUTPATIENT)
Dept: INTERNAL MEDICINE | Facility: CLINIC | Age: 54
End: 2024-12-05
Payer: COMMERCIAL

## 2024-12-05 VITALS — SYSTOLIC BLOOD PRESSURE: 135 MMHG | DIASTOLIC BLOOD PRESSURE: 85 MMHG

## 2024-12-05 DIAGNOSIS — Z78.0 ASYMPTOMATIC MENOPAUSAL STATE: Chronic | ICD-10-CM

## 2024-12-05 DIAGNOSIS — Z12.31 BREAST CANCER SCREENING BY MAMMOGRAM: ICD-10-CM

## 2024-12-05 DIAGNOSIS — F51.04 PSYCHOPHYSIOLOGIC INSOMNIA: Chronic | ICD-10-CM

## 2024-12-05 DIAGNOSIS — J30.1 SEASONAL ALLERGIC RHINITIS DUE TO POLLEN: Chronic | ICD-10-CM

## 2024-12-05 DIAGNOSIS — J45.20 MILD INTERMITTENT ASTHMA WITHOUT COMPLICATION: Chronic | ICD-10-CM

## 2024-12-05 DIAGNOSIS — R73.03 PREDIABETES: Chronic | ICD-10-CM

## 2024-12-05 DIAGNOSIS — F41.9 ANXIETY: Chronic | ICD-10-CM

## 2024-12-05 DIAGNOSIS — E66.3 OVERWEIGHT (BMI 25.0-29.9): Chronic | ICD-10-CM

## 2024-12-05 DIAGNOSIS — Z00.00 PREVENTATIVE HEALTH CARE: Primary | ICD-10-CM

## 2024-12-05 RX ORDER — MINERAL OIL
180 ENEMA (ML) RECTAL DAILY
Qty: 90 TABLET | Refills: 3 | Status: SHIPPED | OUTPATIENT
Start: 2024-12-05

## 2024-12-05 RX ORDER — ZOLPIDEM TARTRATE 5 MG/1
5 TABLET ORAL NIGHTLY PRN
Qty: 90 TABLET | Refills: 1 | Status: SHIPPED | OUTPATIENT
Start: 2024-12-05

## 2024-12-05 RX ORDER — BUSPIRONE HYDROCHLORIDE 15 MG/1
15 TABLET ORAL 3 TIMES DAILY
Qty: 270 TABLET | Refills: 3 | Status: SHIPPED | OUTPATIENT
Start: 2024-12-05

## 2024-12-05 RX ORDER — ALBUTEROL SULFATE 90 UG/1
2 INHALANT RESPIRATORY (INHALATION) EVERY 6 HOURS PRN
Qty: 8.5 G | Refills: 5 | Status: SHIPPED | OUTPATIENT
Start: 2024-12-05

## 2024-12-05 RX ORDER — MULTIVITAMIN
1 TABLET ORAL 2 TIMES DAILY
COMMUNITY
Start: 2024-12-05

## 2024-12-05 RX ORDER — FLUTICASONE PROPIONATE 50 MCG
2 SPRAY, SUSPENSION (ML) NASAL DAILY
Qty: 16 G | Refills: 11 | Status: SHIPPED | OUTPATIENT
Start: 2024-12-05

## 2024-12-05 NOTE — Clinical Note
Schedule OV with Jak in mid-June for annual wellness visit. Schedule fasting labs a few days before that.

## 2024-12-05 NOTE — PROGRESS NOTES
TELEMEDICINE VIRTUAL VIDEO VISIT  12/5/24  4:20 PM CST    Visit Type: Audiovisual    Patient's Location: Liana represents that they are located within the [facility].    History of Present Illness    CHIEF COMPLAINT:  Liana presents today for follow-up on chronic conditions including allergies, anxiety, and insomnia.    LOW BACK PAIN:  She reports recurrence of low back pain following a procedure performed by Dr. Dye a few months ago. She takes medication when the pain is severe but refrains from medication use when it's mild, managing it by maintaining mobility.    ALLERGIES AND ASTHMA:  She takes Allegra and Flonase for allergies with good effect, alternating between Allegra and Zyrtec. Allegra is obtained OTC, while Flonase is prescribed. She reports not using albuterol frequently for asthma management and denies any use in the past 30 days.    ANXIETY AND INSOMNIA:  She takes Buspirone for anxiety, adjusting the dosage based on daily needs, taking it once or twice daily instead of the prescribed 3 times daily. She denies any side effects. For insomnia, she takes Ambien at bedtime, reporting it works well, providing adequate sleep and leaving her feeling refreshed in the morning without any hangover effect.    MEDICATIONS:  She takes calcium and vitamin D supplements over the counter, denying any side effects from these supplements.       Review of Systems   Constitutional:  Negative for activity change and unexpected weight change.   HENT:  Negative for hearing loss, rhinorrhea and trouble swallowing.    Eyes:  Negative for discharge and visual disturbance.   Respiratory:  Negative for chest tightness and wheezing.    Cardiovascular:  Negative for chest pain and palpitations.   Gastrointestinal:  Negative for blood in stool, constipation, diarrhea and vomiting.   Endocrine: Negative for polydipsia and polyuria.   Genitourinary:  Negative for difficulty urinating, dysuria, hematuria and menstrual  problem.   Musculoskeletal:  Negative for joint swelling and neck pain.   Neurological:  Negative for weakness and headaches.   Psychiatric/Behavioral:  Negative for confusion and dysphoric mood.        Assessment & Plan    M54.50 Low back pain, unspecified  J30.9 Allergic rhinitis, unspecified  F41.9 Anxiety disorder, unspecified  G47.00 Insomnia, unspecified  E55.9 Vitamin D deficiency, unspecified  Z12.31 Encounter for screening mammogram for malignant neoplasm of breast  Z79.891 Long term (current) use of opiate analgesic     Reviewed patient's response to recent low back pain procedure by Dr. Dye   Assessed effectiveness of current allergy management with Allegra and Flonase   Evaluated asthma control based on albuterol usage   Considered patient's self-adjustment of Buspirone dosage for anxiety management   Assessed efficacy and side effects of Ambien for insomnia   Noted slightly elevated diastolic blood pressure at last visit, potentially due to pain   Determined patient due for mammogram in January    PATIENT EDUCATION:   Explained importance of home blood pressure monitoring when experiencing pain    ACTION ITEMS/LIFESTYLE:   Obtain home blood pressure cuff for upper arm   Monitor blood pressure at home, especially when experiencing pain    MEDICATIONS:   Continued Allegra OTC for allergies   Continued Flonase nasal spray for allergies   Continued albuterol as needed for asthma   Continued Buspirone 15 mg for anxiety, with patient adjusting frequency based on symptoms (up to 3 times daily)   Continued calcium and vitamin D supplements OTC   Continued Ambien for insomnia, refilled for 90-day supply    ORDERS:   Mammogram ordered to be scheduled on or after January 19th   Labs ordered for June: A1C diabetes screen, comprehensive metabolic panel, cholesterol panel    FOLLOW UP:   Follow up after June labs with Farheen Arnett new advanced practice partner   Contact the office to schedule mammogram    Contact the office to schedule labs for June   Contact the office to schedule follow-up visit       1. Preventative health care  -     Comprehensive Metabolic Panel; Future; Expected date: 12/05/2024  -     Lipid Panel; Future; Expected date: 12/05/2024  -     Hemoglobin A1C; Future; Expected date: 12/05/2024    2. Seasonal allergic rhinitis due to pollen  -     fexofenadine (ALLEGRA) 180 MG tablet; Take 1 tablet (180 mg total) by mouth once daily.  Dispense: 90 tablet; Refill: 3  -     fluticasone propionate (FLONASE) 50 mcg/actuation nasal spray; Use 2 sprays (100 mcg total) in each nostril once daily.  Dispense: 16 g; Refill: 11    3. Mild intermittent asthma without complication  -     albuterol (PROAIR HFA) 90 mcg/actuation inhaler; Inhale 2 puffs into the lungs every 6 (six) hours as needed for Wheezing or Shortness of Breath (or Cough).  Dispense: 8.5 g; Refill: 5    4. Anxiety  Overview:  Sees [facility] mental health counselor once a week.    Orders:  -     busPIRone (BUSPAR) 15 MG tablet; Take 1 tablet (15 mg total) by mouth 3 (three) times daily.  Dispense: 270 tablet; Refill: 3    5. Asymptomatic menopausal state  -     calcium-vitamin D (CALCIUM WITH VITAMIN D) 600 mg-10 mcg (400 unit) Tab; Take 1 tablet by mouth 2 (two) times a day.    6. Psychophysiologic insomnia  -     zolpidem (AMBIEN) 5 MG Tab; Take 1 tablet (5 mg total) by mouth nightly as needed (for insomnia).  Dispense: 90 tablet; Refill: 1    7. Prediabetes  -     Comprehensive Metabolic Panel; Future; Expected date: 12/05/2024  -     Lipid Panel; Future; Expected date: 12/05/2024  -     Hemoglobin A1C; Future; Expected date: 12/05/2024    8. Overweight (BMI 25.0-29.9)  Assessment & Plan:  Wt Readings from Last 6 Encounters:   11/14/24 67 kg (147 lb 11.3 oz)   10/10/24 67 kg (147 lb 11.3 oz)   09/27/24 67.8 kg (149 lb 7.6 oz)   07/09/24 68 kg (149 lb 14.6 oz)   06/19/24 68 kg (149 lb 14.6 oz)   06/13/24 68 kg (149 lb 14.6 oz)     Estimated body  "mass index is 25.35 kg/m² as calculated from the following:    Height as of 11/14/24: 5' 4" (1.626 m).    Weight as of 11/14/24: 67 kg (147 lb 11.3 oz).      Orders:  -     Comprehensive Metabolic Panel; Future; Expected date: 12/05/2024  -     Lipid Panel; Future; Expected date: 12/05/2024    9. Breast cancer screening by mammogram  -     Mammo Digital Screening Bilat w/ Franki; Future; Expected date: 01/19/2025    No other significant complaints or concerns were reported.  Today's visit involved the intricate management of episodic problem(s) and the ongoing care for the patient's serious or complex condition(s) listed above, reflecting the inherent complexity of providing longitudinal, comprehensive evaluation and management as the central hub for the patient's primary care services.  [place] Board  Pharmacy Controlled Prescription Drug Monitoring database was queried and showed no activity to suggest abuse, diversion, or other improper use of prescription medications.   Vitals:    12/05/24 1647   BP: 135/85     PHYSICAL EXAM:  GENERAL APPEARANCE:  - Alert and grossly oriented.  - No apparent distress, breathing comfortably.     EYES:  - Sclera without icterus.     EARS, NOSE, AND THROAT:  - No visible abnormalities.     RESPIRATORY:  - No respiratory distress.  - No audible wheezing or cough.     PSYCHIATRIC:  - Mood and affect appropriate; behavior cooperative.  This note was generated with the assistance of ambient listening technology. Verbal consent was obtained by the patient and accompanying visitor(s) for the recording of patient appointment to facilitate this note. I attest to having reviewed and edited the generated note for accuracy, though some syntax or spelling errors may persist. Please contact the author of this note for any clarification.      I spent a total of 18 minutes today evaluating and managing this patient for this encounter.  This includes face to face time and non-face to face time " "preparing to see the patient (eg, review of tests), obtaining and/or reviewing separately obtained history, documenting clinical information in the electronic or other health record, independently interpreting results and communicating results to the patient/family/caregiver, or care coordinator.  This time was exclusive of any separately billable procedures for this patient and exclusive of time spent treating any other patient.    Documentation entered by me for this encounter may have been done in part using speech-recognition technology. Although I have made an effort to ensure accuracy, "sound like" errors may exist and should be interpreted in context.    Each patient to whom medical services are provided by telemedicine is: (1) informed of the relationship between the physician and patient and the respective role of any other health care provider with respect to management of the patient; and (2) notified that he or she may decline to receive medical services by telemedicine and may withdraw from such care at any time.    FOR FOLLOW-UP AT NEXT APPOINTMENT  @[facility]: Review lab results. Refills. VV F/U with me in 6 months.   "

## 2024-12-05 NOTE — ASSESSMENT & PLAN NOTE
"Wt Readings from Last 6 Encounters:   11/14/24 67 kg (147 lb 11.3 oz)   10/10/24 67 kg (147 lb 11.3 oz)   09/27/24 67.8 kg (149 lb 7.6 oz)   07/09/24 68 kg (149 lb 14.6 oz)   06/19/24 68 kg (149 lb 14.6 oz)   06/13/24 68 kg (149 lb 14.6 oz)     Estimated body mass index is 25.35 kg/m² as calculated from the following:    Height as of 11/14/24: 5' 4" (1.626 m).    Weight as of 11/14/24: 67 kg (147 lb 11.3 oz).    "

## 2024-12-17 ENCOUNTER — OFFICE VISIT (OUTPATIENT)
Dept: OBSTETRICS AND GYNECOLOGY | Facility: CLINIC | Age: 54
End: 2024-12-17
Payer: COMMERCIAL

## 2024-12-17 VITALS
HEIGHT: 64 IN | DIASTOLIC BLOOD PRESSURE: 72 MMHG | SYSTOLIC BLOOD PRESSURE: 110 MMHG | WEIGHT: 151.44 LBS | BODY MASS INDEX: 25.85 KG/M2

## 2024-12-17 DIAGNOSIS — Z01.419 WELL WOMAN EXAM WITH ROUTINE GYNECOLOGICAL EXAM: Primary | ICD-10-CM

## 2024-12-17 DIAGNOSIS — N95.1 HOT FLASHES DUE TO MENOPAUSE: ICD-10-CM

## 2024-12-17 PROCEDURE — 3074F SYST BP LT 130 MM HG: CPT | Mod: CPTII,S$GLB,, | Performed by: OBSTETRICS & GYNECOLOGY

## 2024-12-17 PROCEDURE — 99999 PR PBB SHADOW E&M-EST. PATIENT-LVL III: CPT | Mod: PBBFAC,,, | Performed by: OBSTETRICS & GYNECOLOGY

## 2024-12-17 PROCEDURE — 3044F HG A1C LEVEL LT 7.0%: CPT | Mod: CPTII,S$GLB,, | Performed by: OBSTETRICS & GYNECOLOGY

## 2024-12-17 PROCEDURE — 3078F DIAST BP <80 MM HG: CPT | Mod: CPTII,S$GLB,, | Performed by: OBSTETRICS & GYNECOLOGY

## 2024-12-17 PROCEDURE — 1159F MED LIST DOCD IN RCRD: CPT | Mod: CPTII,S$GLB,, | Performed by: OBSTETRICS & GYNECOLOGY

## 2024-12-17 PROCEDURE — 99396 PREV VISIT EST AGE 40-64: CPT | Mod: S$GLB,,, | Performed by: OBSTETRICS & GYNECOLOGY

## 2024-12-17 PROCEDURE — 1160F RVW MEDS BY RX/DR IN RCRD: CPT | Mod: CPTII,S$GLB,, | Performed by: OBSTETRICS & GYNECOLOGY

## 2024-12-17 PROCEDURE — 3008F BODY MASS INDEX DOCD: CPT | Mod: CPTII,S$GLB,, | Performed by: OBSTETRICS & GYNECOLOGY

## 2024-12-17 NOTE — PROGRESS NOTES
Subjective     Patient ID: Liana Keane is a 54 y.o. female.    Chief Complaint:  Annual Exam      History of Present Illness  Annual Exam-Postmenopausal  Patient presents for annual exam. The patient has no complaints today. The patient is sexually active. GYN screening history: last pap: approximate date  and was normal and last mammogram: approximate date  and was normal. The patient is not taking hormone replacement therapy. Patient denies post-menopausal vaginal bleeding. The patient wears seatbelts: yes. The patient participates in regular exercise: yes. Has the patient ever been transfused or tattooed?: no. The patient reports that there is not domestic violence in her life.    GYN & OB History  Patient's last menstrual period was 2021.   Date of Last Pap: 2021    OB History    Para Term  AB Living   4 2 2   2 2   SAB IAB Ectopic Multiple Live Births   1 1     1      # Outcome Date GA Lbr Shahram/2nd Weight Sex Type Anes PTL Lv   4 IAB            3 SAB            2 Term     M Vag-Spont      1 Term     M Vag-Spont   BALDOMERO       Review of Systems  Review of Systems   Constitutional:  Negative for activity change, appetite change, chills, fatigue, fever and unexpected weight change.   Respiratory:  Negative for shortness of breath.    Cardiovascular:  Negative for chest pain, palpitations and leg swelling.   Gastrointestinal:  Negative for abdominal pain, bloating, blood in stool, constipation, diarrhea, nausea and vomiting.   Genitourinary:  Positive for hot flashes. Negative for dyspareunia, dysuria, flank pain, frequency, genital sores, hematuria, pelvic pain, urgency, vaginal bleeding, vaginal discharge, vaginal pain, urinary incontinence, postcoital bleeding, postmenopausal bleeding, vaginal dryness and vaginal odor.   Musculoskeletal:  Negative for back pain.   Integumentary:  Negative for rash, breast mass, nipple discharge, breast skin changes and breast tenderness.    Neurological:  Negative for syncope and headaches.   Breast: Negative for asymmetry, lump, mass, mastodynia, nipple discharge, skin changes and tenderness         Objective   Physical Exam:   Constitutional: She is oriented to person, place, and time. She appears well-developed and well-nourished. No distress.    HENT:   Head: Normocephalic and atraumatic.    Eyes: Pupils are equal, round, and reactive to light. EOM are normal.     Cardiovascular:  Normal rate, regular rhythm and normal heart sounds.             Pulmonary/Chest: Effort normal and breath sounds normal.        Abdominal: Soft. Bowel sounds are normal. She exhibits no distension. There is no abdominal tenderness.     Genitourinary:    Vagina, uterus, right adnexa and left adnexa normal.      Pelvic exam was performed with patient supine.   There is no rash, tenderness, lesion or injury on the right labia. There is no rash, tenderness, lesion or injury on the left labia. Cervix is normal. Right adnexum displays no mass, no tenderness and no fullness. Left adnexum displays no mass, no tenderness and no fullness. No erythema, vaginal discharge, tenderness or bleeding in the vagina.    No foreign body in the vagina.      No signs of injury in the vagina.   Cervix exhibits no motion tenderness, no discharge and no friability.    pap smear completedUterus is not deviated, not enlarged and not tender.           Musculoskeletal: Normal range of motion and moves all extremeties. No tenderness or edema.       Neurological: She is alert and oriented to person, place, and time.    Skin: Skin is warm and dry.    Psychiatric: She has a normal mood and affect. Her behavior is normal. Thought content normal.            Assessment and Plan     1. Well woman exam with routine gynecological exam    2. Hot flashes due to menopause           Plan:  Well woman exam with routine gynecological exam  -     Liquid-Based Pap Smear, Screening  -     HPV High Risk Genotypes,  PCR  -     Pt was counseled on cervical/vaginal screening guidelines and recommendations.  Last pap NILM on 2021.  If today's pap smear result is negative, next pap smear will be due in 3 yrs.  -     Pt was advised on current breast cancer screening recommendations.  Pt desires to proceed with screening MMG.  -     Follow up with PCP for routine health maintenance needs.    Hot flashes due to menopause  -     Symptoms are moderate and are well tolerated with no medications.      Follow up in about 1 year (around 12/17/2025).

## 2025-01-30 ENCOUNTER — HOSPITAL ENCOUNTER (OUTPATIENT)
Dept: RADIOLOGY | Facility: HOSPITAL | Age: 55
Discharge: HOME | End: 2025-01-30
Attending: FAMILY MEDICINE
Payer: COMMERCIAL

## 2025-01-30 VITALS — BODY MASS INDEX: 25.85 KG/M2 | HEIGHT: 64 IN | WEIGHT: 151.44 LBS

## 2025-01-30 DIAGNOSIS — Z12.31 BREAST CANCER SCREENING BY MAMMOGRAM: ICD-10-CM

## 2025-01-30 PROCEDURE — 77063 BREAST TOMOSYNTHESIS BI: CPT | Mod: 26,,, | Performed by: RADIOLOGY

## 2025-01-30 PROCEDURE — 77063 BREAST TOMOSYNTHESIS BI: CPT | Mod: TC

## 2025-01-30 PROCEDURE — 77067 SCR MAMMO BI INCL CAD: CPT | Mod: 26,,, | Performed by: RADIOLOGY

## 2025-02-01 NOTE — PROGRESS NOTES
Liana Mcfadden.    I am happy to report that your recent breast imaging did NOT show evidence of cancer.    An annual mammogram is the best test to screen for breast cancer, but it is not perfect, and it can miss some cancers. So, even though your mammogram was normal, if you notice any lump or change in one of your breasts, please schedule an appointment with me for a proper evaluation.    Thanks for letting me care for you, and thanks for trusting [facility] with your healthcare needs.    All the best,    Dr. TAYLOR

## 2025-02-13 NOTE — PROGRESS NOTES
Established Patient - TeleHealth Visit    The patient location is: [place]  The chief complaint leading to consultation is: chronic pain     Visit type: Audiovisual telemedicine visit     30 minutes of total time spent on the encounter, which includes face to face time and non-face to face time preparing to see the patient (eg, review of tests), Obtaining and/or reviewing separately obtained history, Documenting clinical information in the electronic or other health record, Independently interpreting results (not separately reported) and communicating results to the patient/family/caregiver, or Care coordination (not separately reported).     Each patient to whom he or she provides medical services by telemedicine is:  (1) informed of the relationship between the physician and patient and the respective role of any other health care provider with respect to management of the patient; and (2) notified that he or she may decline to receive medical services by telemedicine and may withdraw from such care at any time.        Chronic Pain -- Established Patient (Follow-up visit)  Referring Physician: Hector Barker MD    PCP: JEFF Simmons MD      Chief complaint:  Follow-up     C/o low back pain with RLE radicular pain -pain worse at night       SUBJECTIVE:    Interval History (2/14/2025):  Patient presents today for follow-up visit.  Patient was last seen on 11/14/2024. At that visit, the plan was to continue Lyrica, which helps some. She has been very active lately, so pain has worsened. Patient reports pain as 8/10 today.  She had right L4/5 + L5/S1 TF NORAH 4 months ago with 90% pain relief, and the pain is starting to return.  She continues to take Lyrica at night, and she does not have any obvious next day drowsiness, so seems to be tolerating well.    Interval History (11/14/2024): Liana Keane presents today for follow-up visit.  she underwent right L4/5 + L5/S1 TF NORAH on 10/10/24.  The patient reports  "that she is/was better following the procedure.  she reports 90% pain relief.  The changes lasted 4 weeks so far.  The changes have continued through this visit.  Patient reports pain as 1/10 today.  Nighttime pain has also improved.  Overall, she is doing much better than she was prior to the injection.    Interval History (9/20/2024):  Patient presents today for follow-up visit to review lumbar MRI. At that visit, the plan was to start Lyrica at night and order formal physical therapy. Patient reports pain as "0/10 in the morning, 10/10 at night when going to bed. Pain is also worse with activity.  She has been taking the Lyrica at night, which is helping some.  She has also been attending physical therapy, which is also helping.    Initial HPI (08/13/2024) - Dr. Song:  Liana Keane is a 53 y.o. female who presents to the clinic for the evaluation of lower back pain.   Patient reports 2 month history of lower back pain.  Patient denies any inciting traumas to her lower back pain.  Patient denies any previous surgeries on her lumbar spine or major joints.  Pain is described as a throbbing aching pain in the lower back in the right lower extremity.  Patient reports that most painful location is the right anterior thigh that is described as a burning pain.  Patient reports that lower back pain does seem to radiate to her right anterior thigh.  Patient reports occasional radiation to her left lower extremity, but reports that her right-sided pain is her primary pain.  Pain is worse when we up in the morning and lying on her right side, better with rest.  Pain is currently rated a 2/10, but can increase to an 8/10 with exacerbating activity.  Patient denies any fevers, chills, saddle anesthesia, or bowel and bladder incontinence.      Non-Pharmacologic Treatments:  Physical Therapy/Home Exercise: yes  Ice/Heat:yes  TENS: no  Acupuncture: no  Massage: yes  Chiropractic: no        Previous Pain " Medications:  Tylenol, meloxicam, ibuprofen, Flexeril, topicals      Pain Procedures:   - 10/10/24: right L4/5 + L5/S1 TF NORAH, 90% pain relief          Pain Disability Index Review:       No data to display                    Imaging/ Diagnostic Studies/ Labs (Reviewed on 2/14/2025):    08/19/2024 MRI Lumbar Spine Without Contrast  COMPARISON: 08/13/2024 radiograph    FINDINGS:  Vertebral body heights maintained.  No spondylolisthesis.  Mild Modic type 1 endplate changes of the L5 inferior endplate.  Mild perifacet edematous changes at the L4-5 and L5-S1 level.  Multilevel early intervertebral disc desiccation changes without significant height loss.    Conus terminates normally.  Visualized intra-abdominal/pelvic structures unremarkable.    L1-L2: No spinal canal stenosis or neural foraminal narrowing.    L2-L3: No spinal canal stenosis or neural foraminal narrowing.    L3-L4: Minimal circumferential disc bulge without spinal canal stenosis or significant neural foraminal narrowing.  Mild-to-moderate facet arthropathy.    L4-L5: Mild circumferential disc bulging without spinal canal stenosis.  Moderate facet arthropathy contribute to mild bilateral inferior neural foraminal narrowing at L4-5.    L5-S1: Mild circumferential disc bulge without spinal canal stenosis.  Mild-to-moderate facet arthropathy causing minimal bilateral inferior neural foraminal narrowing at L5-S1.  Impression:   Multilevel lower lumbar spine primarily facet degenerative findings without high-grade spinal canal stenosis or neural foraminal narrowing.      08/13/2024 X-Ray Lumbar Complete Including Flex And Ext  COMPARISON: None  FINDINGS:  Seven views of the lumbar spine were obtained.  No evidence of acute fracture or dislocation.  Bony mineralization is normal.  Soft tissues are unremarkable.   Mild spondylosis throughout the visualized thoracolumbar spine.  Mild facet arthropathy lower lumbar spine.  Normal alignment on flexion extension  views.  Impression  No acute abnormalities        06/13/24 X-Ray Hip 2 or 3 views Right with Pelvis when performed  FINDINGS:  No pelvic fracture.  No fracture dislocation or degenerative change of the right hip.  No evidence for avascular necrosis of the right femoral head.  Impression  Normal pelvis and right hip x-ray.      7/9/2024 BLE EMG/NCS   ABNORMAL study  2. There is electrodiagnostic evidence of a mild subacute on chronic radiculopathy of the right L5 nerve root                Review of Systems:   Constitutional:  Negative for chills, diaphoresis, fatigue and fever.   HENT:  Negative for ear discharge, ear pain, rhinorrhea, trouble swallowing and voice change.    Respiratory:  Negative for chest tightness, shortness of breath, wheezing and stridor.    Cardiovascular:  Negative for chest pain and leg swelling.   Gastrointestinal:  Negative for blood in stool, diarrhea, nausea and vomiting.   Endocrine: Negative for cold intolerance and heat intolerance.   Genitourinary:  Negative for dysuria, hematuria and urgency.   Musculoskeletal:  Positive for arthralgias, back pain, gait problem, joint swelling and myalgias. Negative for neck pain and neck stiffness.   Skin:  Negative for rash.   Neurological:  Positive for weakness and numbness. Negative for tremors, seizures, speech difficulty, light-headedness and headaches.   Hematological:  Does not bruise/bleed easily.   Psychiatric/Behavioral:  Negative for agitation, confusion and suicidal ideas.          OBJECTIVE:    Telemedicine Physical Exam:   Vitals:    02/14/25 0823   Resp: 16     There is no height or weight on file to calculate BMI.   (reviewed on 2/14/2025)     GENERAL: Well appearing, in no acute distress, alert and oriented x3.  Cooperative.  PSYCH:  Mood and affect appropriate.  SKIN: Skin color & texture with no obvious abnormalities.    HEAD/FACE:  Normocephalic, atraumatic.    PULM:  No nasal flaring. No obvious wheezing.  EXTREMITIES: No  obvious deformities.   MUSCULOSKELETAL: No obvious atrophy abnormalities are noted.   NEURO: No obvious neurologic deficit.   GAIT: sitting.     Physical Exam: last in clinic visit:  Physical Exam  Constitutional:       General: She is not in acute distress.     Appearance: Normal appearance. She is not ill-appearing.   HENT:      Head: Normocephalic and atraumatic.      Nose: No congestion or rhinorrhea.   Eyes:      Extraocular Movements: Extraocular movements intact.      Pupils: Pupils are equal, round, and reactive to light.   Cardiovascular:      Pulses: Normal pulses.   Pulmonary:      Effort: Pulmonary effort is normal.   Musculoskeletal:      Right hip: Tenderness present. No deformity, lacerations, bony tenderness or crepitus. Decreased range of motion. Decreased strength.      Left hip: Normal.   Skin:     General: Skin is warm and dry.      Capillary Refill: Capillary refill takes less than 2 seconds.   Neurological:      General: No focal deficit present.      Mental Status: She is alert and oriented to person, place, and time.      Sensory: No sensory deficit.      Motor: Weakness present. No abnormal muscle tone.      Gait: Gait normal.      Deep Tendon Reflexes:      Reflex Scores:       Patellar reflexes are 1+ on the right side and 2+ on the left side.       Achilles reflexes are 1+ on the right side and 2+ on the left side.     Comments: 4/5 strength in right hip adduction in right knee extension   Psychiatric:         Mood and Affect: Mood normal.         Behavior: Behavior normal.         Thought Content: Thought content normal.     Musculoskeletal:  Lumbar Exam  Incision: no  Pain with Flexion: yes  Pain with Extension: yes  ROM:  Decreased  Paraspinous TTP:  Right-greater-than-left  Facet TTP:  L5-S1  Facet Loading:  Negative bilaterally  SLR:  Positive on the right at 80°  SIJ TTP:  Positive on the right  JOHNY:  Negative bilaterally              ASSESSMENT:     54 y.o. year old female with  lower back and right lower extremity pain, consistent with right lumbar radiculopathy with possible overlapping hip pathology and sacroiliac joint dysfunction.    1. Right lumbar radiculopathy  pregabalin (LYRICA) 50 MG capsule      2. Arthropathy of right sacroiliac joint        3. Vertebrogenic low back pain        4. Degeneration of intervertebral disc of lumbar region with discogenic back pain and lower extremity pain        5. Lumbar spondylosis          Right lumbar radiculopathy  -     pregabalin (LYRICA) 50 MG capsule; Take 1 capsule (50 mg total) by mouth 2 (two) times daily.  Dispense: 60 capsule; Refill: 1    Arthropathy of right sacroiliac joint    Vertebrogenic low back pain    Degeneration of intervertebral disc of lumbar region with discogenic back pain and lower extremity pain    Lumbar spondylosis          PLAN:   - Interventions:   - Consider repeat right L4/5 + L5/S1 TF NORAH - if limited improvement from increase in Lyrica.   She had right L4/5 + L5/S1 TF NORAH 4 months ago with 90% pain relief, and the pain is starting to return.  - S/p right L4/5 + L5/S1 TF NORAH on 10/10/24 with 90% pain relief.    - Anticoagulation use:   No no anticoagulation    - Medications:  - Increase Lyrica (pregabalin) 50mg QHS  to BID. If too drowsy, can take 100mg QHS. Consider further increase.   - Continue Mobic 15mg QD PRN.    - [place]  reviewed and appropriate.      - Therapy:   - Continue PT-directed at home exercises learned from physical therapy/ ambulation/ stretching to help manage the patient/s painful condition.  She has completed formal physical therapy.    - Imaging/Diagnostic:  - 08/19/2024 MRI Lumbar reviewed: Multilevel lower lumbar spine primarily facet degenerative findings without high-grade spinal canal stenosis or neural foraminal narrowing.  - x-rays of right hip reveal no osseous abnormality  - 7/9/2024 BLE EMG/NCS: mild subacute on chronic right L5 radiculopathy    - Consults:   - Continue follow up  with Dr. Barker (Orthopedics) for right hip pain PRN:       - Follow up visit: 6 weeks follow-up - discuss Lyrica efficacy and repeat NORAH if needed - virtual visit       Future Appointments   Date Time Provider Department Center   3/21/2025  8:20 AM Cheryl Richardson PA-C [facility]   6/13/2025  7:35 AM LABORATORY, [facility] LAB [facility]   6/18/2025  4:20 PM Arabella Arnett, [facility]       - Patient Questions: Answered all of the patient's questions regarding diagnosis, therapy, and treatment.    - This condition does not require this patient to take time off of work, and the primary goal of our Pain Management services is to improve the patient's functional capacity.   - I discussed the risks, benefits, and alternatives to potential treatment options. All questions and concerns were fully addressed today in clinic.         Cheryl Richardson PA-C  Interventional Pain Management - [facility]    Disclaimer:  This note was prepared using voice recognition system and is likely to have sound alike errors that may have been overlooked even after proof reading.  Please call me with any questions.

## 2025-02-14 ENCOUNTER — PATIENT MESSAGE (OUTPATIENT)
Dept: PAIN MEDICINE | Facility: CLINIC | Age: 55
End: 2025-02-14

## 2025-02-14 ENCOUNTER — OFFICE VISIT (OUTPATIENT)
Dept: PAIN MEDICINE | Facility: CLINIC | Age: 55
End: 2025-02-14
Payer: COMMERCIAL

## 2025-02-14 VITALS — RESPIRATION RATE: 16 BRPM

## 2025-02-14 DIAGNOSIS — M51.362 DEGENERATION OF INTERVERTEBRAL DISC OF LUMBAR REGION WITH DISCOGENIC BACK PAIN AND LOWER EXTREMITY PAIN: ICD-10-CM

## 2025-02-14 DIAGNOSIS — M47.818 ARTHROPATHY OF RIGHT SACROILIAC JOINT: ICD-10-CM

## 2025-02-14 DIAGNOSIS — M54.51 VERTEBROGENIC LOW BACK PAIN: ICD-10-CM

## 2025-02-14 DIAGNOSIS — M47.816 LUMBAR SPONDYLOSIS: ICD-10-CM

## 2025-02-14 DIAGNOSIS — M54.16 RIGHT LUMBAR RADICULOPATHY: Primary | ICD-10-CM

## 2025-02-14 RX ORDER — PREGABALIN 50 MG/1
50 CAPSULE ORAL 2 TIMES DAILY
Qty: 60 CAPSULE | Refills: 1 | Status: SHIPPED | OUTPATIENT
Start: 2025-02-14

## 2025-03-21 ENCOUNTER — OFFICE VISIT (OUTPATIENT)
Dept: PAIN MEDICINE | Facility: CLINIC | Age: 55
End: 2025-03-21
Payer: COMMERCIAL

## 2025-03-21 VITALS — HEIGHT: 64 IN | RESPIRATION RATE: 17 BRPM | BODY MASS INDEX: 26 KG/M2

## 2025-03-21 DIAGNOSIS — M54.51 VERTEBROGENIC LOW BACK PAIN: ICD-10-CM

## 2025-03-21 DIAGNOSIS — M47.818 ARTHROPATHY OF RIGHT SACROILIAC JOINT: ICD-10-CM

## 2025-03-21 DIAGNOSIS — M54.16 RIGHT LUMBAR RADICULOPATHY: Primary | ICD-10-CM

## 2025-03-21 DIAGNOSIS — M51.362 DEGENERATION OF INTERVERTEBRAL DISC OF LUMBAR REGION WITH DISCOGENIC BACK PAIN AND LOWER EXTREMITY PAIN: ICD-10-CM

## 2025-03-21 NOTE — PROGRESS NOTES
"Established Patient - TeleHealth Visit    The patient location is: [place]  The chief complaint leading to consultation is: chronic pain     Visit type: Audiovisual telemedicine visit     Total time spent on the encounter includes Face-to-face time and non-face to face time preparing to see the patient (eg, review of tests), Obtaining and/or reviewing separately obtained history, Documenting clinical information in the electronic or other health record, Independently interpreting results (not separately reported) and communicating results to the patient/family/caregiver, and/or Care coordination (not separately reported).     Each patient to whom he or she provides medical services by telemedicine is:  (1) informed of the relationship between the physician and patient and the respective role of any other health care provider with respect to management of the patient; and (2) notified that he or she may decline to receive medical services by telemedicine and may withdraw from such care at any time.          Chronic Pain -- Established Patient (Follow-up visit)  Referring Physician: Hector Barker MD    PCP: JEFF Simmons MD      Chief complaint:  Follow-up     C/o low back pain with RLE radicular pain -pain worse at night       SUBJECTIVE:    Interval History (3/21/2025):  Liana Keane presents today for follow-up visit.  Patient was last seen on 2/14/2025. At that visit, the plan was to increase Lyrica.  She is taking Lyrica twice per day, which is definitely controlling her pain.  Overall, she is much better than she was at the last visit.  She just has to be careful which she she wears, and if she stands for too long, pain increases.  Patient reports pain as "0/10 today.    Interval History (2/14/2025):  Patient presents today for follow-up visit.  Patient was last seen on 11/14/2024. At that visit, the plan was to continue Lyrica, which helps some. She has been very active lately, so pain has worsened. " "Patient reports pain as 8/10 today.  She had right L4/5 + L5/S1 TF NORAH 4 months ago with 90% pain relief, and the pain is starting to return.  She continues to take Lyrica at night, and she does not have any obvious next day drowsiness, so seems to be tolerating well.    Interval History (11/14/2024): Liana Keane presents today for follow-up visit.  she underwent right L4/5 + L5/S1 TF NORAH on 10/10/24.  The patient reports that she is/was better following the procedure.  she reports 90% pain relief.  The changes lasted 4 weeks so far.  The changes have continued through this visit.  Patient reports pain as 1/10 today.  Nighttime pain has also improved.  Overall, she is doing much better than she was prior to the injection.    Interval History (9/20/2024):  Patient presents today for follow-up visit to review lumbar MRI. At that visit, the plan was to start Lyrica at night and order formal physical therapy. Patient reports pain as "0/10 in the morning, 10/10 at night when going to bed. Pain is also worse with activity.  She has been taking the Lyrica at night, which is helping some.  She has also been attending physical therapy, which is also helping.    Initial HPI (08/13/2024) - Dr. Song:  Liana Keane is a 53 y.o. female who presents to the clinic for the evaluation of lower back pain.   Patient reports 2 month history of lower back pain.  Patient denies any inciting traumas to her lower back pain.  Patient denies any previous surgeries on her lumbar spine or major joints.  Pain is described as a throbbing aching pain in the lower back in the right lower extremity.  Patient reports that most painful location is the right anterior thigh that is described as a burning pain.  Patient reports that lower back pain does seem to radiate to her right anterior thigh.  Patient reports occasional radiation to her left lower extremity, but reports that her right-sided pain is her primary pain.  Pain is worse " when we up in the morning and lying on her right side, better with rest.  Pain is currently rated a 2/10, but can increase to an 8/10 with exacerbating activity.  Patient denies any fevers, chills, saddle anesthesia, or bowel and bladder incontinence.      Non-Pharmacologic Treatments:  Physical Therapy/Home Exercise: yes  Ice/Heat:yes  TENS: no  Acupuncture: no  Massage: yes  Chiropractic: no        Previous Pain Medications:  Tylenol, meloxicam, ibuprofen, Flexeril, topicals      Pain Procedures:   - 10/10/24: right L4/5 + L5/S1 TF NORAH, 90% pain relief          Pain Disability Index Review:       No data to display                    Imaging/ Diagnostic Studies/ Labs (Reviewed on 3/21/2025):    08/19/2024 MRI Lumbar Spine Without Contrast  COMPARISON: 08/13/2024 radiograph    FINDINGS:  Vertebral body heights maintained.  No spondylolisthesis.  Mild Modic type 1 endplate changes of the L5 inferior endplate.  Mild perifacet edematous changes at the L4-5 and L5-S1 level.  Multilevel early intervertebral disc desiccation changes without significant height loss.    Conus terminates normally.  Visualized intra-abdominal/pelvic structures unremarkable.    L1-L2: No spinal canal stenosis or neural foraminal narrowing.    L2-L3: No spinal canal stenosis or neural foraminal narrowing.    L3-L4: Minimal circumferential disc bulge without spinal canal stenosis or significant neural foraminal narrowing.  Mild-to-moderate facet arthropathy.    L4-L5: Mild circumferential disc bulging without spinal canal stenosis.  Moderate facet arthropathy contribute to mild bilateral inferior neural foraminal narrowing at L4-5.    L5-S1: Mild circumferential disc bulge without spinal canal stenosis.  Mild-to-moderate facet arthropathy causing minimal bilateral inferior neural foraminal narrowing at L5-S1.  Impression:   Multilevel lower lumbar spine primarily facet degenerative findings without high-grade spinal canal stenosis or neural  foraminal narrowing.      08/13/2024 X-Ray Lumbar Complete Including Flex And Ext  COMPARISON: None  FINDINGS:  Seven views of the lumbar spine were obtained.  No evidence of acute fracture or dislocation.  Bony mineralization is normal.  Soft tissues are unremarkable.   Mild spondylosis throughout the visualized thoracolumbar spine.  Mild facet arthropathy lower lumbar spine.  Normal alignment on flexion extension views.  Impression  No acute abnormalities        06/13/24 X-Ray Hip 2 or 3 views Right with Pelvis when performed  FINDINGS:  No pelvic fracture.  No fracture dislocation or degenerative change of the right hip.  No evidence for avascular necrosis of the right femoral head.  Impression  Normal pelvis and right hip x-ray.      7/9/2024 BLE EMG/NCS   ABNORMAL study  2. There is electrodiagnostic evidence of a mild subacute on chronic radiculopathy of the right L5 nerve root                Review of Systems:   Constitutional:  Negative for chills, diaphoresis, fatigue and fever.   HENT:  Negative for ear discharge, ear pain, rhinorrhea, trouble swallowing and voice change.    Respiratory:  Negative for chest tightness, shortness of breath, wheezing and stridor.    Cardiovascular:  Negative for chest pain and leg swelling.   Gastrointestinal:  Negative for blood in stool, diarrhea, nausea and vomiting.   Endocrine: Negative for cold intolerance and heat intolerance.   Genitourinary:  Negative for dysuria, hematuria and urgency.   Musculoskeletal:  Positive for arthralgias, back pain, gait problem, joint swelling and myalgias. Negative for neck pain and neck stiffness.   Skin:  Negative for rash.   Neurological:  Positive for weakness and numbness. Negative for tremors, seizures, speech difficulty, light-headedness and headaches.   Hematological:  Does not bruise/bleed easily.   Psychiatric/Behavioral:  Negative for agitation, confusion and suicidal ideas.          OBJECTIVE:    Telemedicine Physical Exam:  "  Vitals:    03/21/25 0819   Resp: 17   Height: 5' 4" (1.626 m)       Body mass index is 26 kg/m².   (reviewed on 3/21/2025)     GENERAL: Well appearing, in no acute distress, alert and oriented x3.  Cooperative.  PSYCH:  Mood and affect appropriate.  SKIN: Skin color & texture with no obvious abnormalities.    HEAD/FACE:  Normocephalic, atraumatic.    PULM:  No nasal flaring. No obvious wheezing.  EXTREMITIES: No obvious deformities.   MUSCULOSKELETAL: No obvious atrophy abnormalities are noted.   GAIT: sitting.     Physical Exam: last in clinic visit:  Physical Exam  Constitutional:       General: She is not in acute distress.     Appearance: Normal appearance. She is not ill-appearing.   HENT:      Head: Normocephalic and atraumatic.      Nose: No congestion or rhinorrhea.   Eyes:      Extraocular Movements: Extraocular movements intact.      Pupils: Pupils are equal, round, and reactive to light.   Cardiovascular:      Pulses: Normal pulses.   Pulmonary:      Effort: Pulmonary effort is normal.   Musculoskeletal:      Right hip: Tenderness present. No deformity, lacerations, bony tenderness or crepitus. Decreased range of motion. Decreased strength.      Left hip: Normal.   Skin:     General: Skin is warm and dry.      Capillary Refill: Capillary refill takes less than 2 seconds.   Neurological:      General: No focal deficit present.      Mental Status: She is alert and oriented to person, place, and time.      Sensory: No sensory deficit.      Motor: Weakness present. No abnormal muscle tone.      Gait: Gait normal.      Deep Tendon Reflexes:      Reflex Scores:       Patellar reflexes are 1+ on the right side and 2+ on the left side.       Achilles reflexes are 1+ on the right side and 2+ on the left side.     Comments: 4/5 strength in right hip adduction in right knee extension   Psychiatric:         Mood and Affect: Mood normal.         Behavior: Behavior normal.         Thought Content: Thought content " normal.     Musculoskeletal:  Lumbar Exam  Incision: no  Pain with Flexion: yes  Pain with Extension: yes  ROM:  Decreased  Paraspinous TTP:  Right-greater-than-left  Facet TTP:  L5-S1  Facet Loading:  Negative bilaterally  SLR:  Positive on the right at 80°  SIJ TTP:  Positive on the right  JOHNY:  Negative bilaterally              ASSESSMENT:     54 y.o. year old female with lower back and right lower extremity pain, consistent with right lumbar radiculopathy with possible overlapping hip pathology and sacroiliac joint dysfunction.    1. Right lumbar radiculopathy        2. Arthropathy of right sacroiliac joint        3. Vertebrogenic low back pain        4. Degeneration of intervertebral disc of lumbar region with discogenic back pain and lower extremity pain            Right lumbar radiculopathy    Arthropathy of right sacroiliac joint    Vertebrogenic low back pain    Degeneration of intervertebral disc of lumbar region with discogenic back pain and lower extremity pain            PLAN:   - Interventions:   - Consider repeat right L4/5 + L5/S1 TF NORAH - if limited improvement from increase in Lyrica.   She had right L4/5 + L5/S1 TF NORAH 4 months ago with 90% pain relief, and the pain is starting to return.  - S/p right L4/5 + L5/S1 TF NORAH on 10/10/24 with 90% pain relief.    - Anticoagulation use:   No no anticoagulation    - Medications:  - Refill Lyrica (pregabalin) 50mg BID. If too drowsy, can take 100mg QHS. Consider further increase.   - Continue Mobic 15mg QD PRN.    - [place]  reviewed and appropriate.      - Therapy:   - Continue PT-directed at home exercises learned from physical therapy/ ambulation/ stretching to help manage the patient/s painful condition.  She has completed formal physical therapy.    - Imaging/Diagnostic:  - 08/19/2024 MRI Lumbar reviewed: Multilevel lower lumbar spine primarily facet degenerative findings without high-grade spinal canal stenosis or neural foraminal narrowing.  -  x-rays of right hip reveal no osseous abnormality  - 7/9/2024 BLE EMG/NCS: mild subacute on chronic right L5 radiculopathy    - Consults:   - Continue follow up with Dr. Barker (Orthopedics) for right hip pain PRN:       - Follow up visit: 6 weeks follow-up - discuss Lyrica efficacy and repeat NORAH if needed - virtual visit       Future Appointments   Date Time Provider Department Center   6/13/2025  7:35 AM LABORATORY, HGV HGV LAB [facility]   6/18/2025  4:20 PM Arabella Arnett, BERNARD [facility] IM [facility]   6/20/2025  8:20 AM Cheryl Richardson PA-C [facility]       - Patient Questions: Answered all of the patient's questions regarding diagnosis, therapy, and treatment.    - This condition does not require this patient to take time off of work, and the primary goal of our Pain Management services is to improve the patient's functional capacity.   - I discussed the risks, benefits, and alternatives to potential treatment options. All questions and concerns were fully addressed today in clinic.         Cheryl Richardson PA-C  Interventional Pain Management - [facility]    Disclaimer:  This note was prepared using voice recognition system and is likely to have sound alike errors that may have been overlooked even after proof reading.  Please call me with any questions.

## 2025-05-02 ENCOUNTER — OFFICE VISIT (OUTPATIENT)
Dept: INTERNAL MEDICINE | Facility: CLINIC | Age: 55
End: 2025-05-02
Payer: COMMERCIAL

## 2025-05-02 ENCOUNTER — HOSPITAL ENCOUNTER (OUTPATIENT)
Dept: RADIOLOGY | Facility: HOSPITAL | Age: 55
Discharge: HOME | End: 2025-05-02
Attending: NURSE PRACTITIONER
Payer: COMMERCIAL

## 2025-05-02 VITALS
OXYGEN SATURATION: 99 % | SYSTOLIC BLOOD PRESSURE: 130 MMHG | DIASTOLIC BLOOD PRESSURE: 84 MMHG | HEIGHT: 64 IN | HEART RATE: 97 BPM | WEIGHT: 156.5 LBS | BODY MASS INDEX: 26.72 KG/M2 | TEMPERATURE: 99 F

## 2025-05-02 DIAGNOSIS — G62.9 PERIPHERAL POLYNEUROPATHY: Primary | ICD-10-CM

## 2025-05-02 DIAGNOSIS — G62.9 PERIPHERAL POLYNEUROPATHY: ICD-10-CM

## 2025-05-02 PROCEDURE — 3044F HG A1C LEVEL LT 7.0%: CPT | Mod: CPTII,S$GLB,, | Performed by: NURSE PRACTITIONER

## 2025-05-02 PROCEDURE — 3008F BODY MASS INDEX DOCD: CPT | Mod: CPTII,S$GLB,, | Performed by: NURSE PRACTITIONER

## 2025-05-02 PROCEDURE — 99999 PR PBB SHADOW E&M-EST. PATIENT-LVL V: CPT | Mod: PBBFAC,,, | Performed by: NURSE PRACTITIONER

## 2025-05-02 PROCEDURE — 1160F RVW MEDS BY RX/DR IN RCRD: CPT | Mod: CPTII,S$GLB,, | Performed by: NURSE PRACTITIONER

## 2025-05-02 PROCEDURE — 3075F SYST BP GE 130 - 139MM HG: CPT | Mod: CPTII,S$GLB,, | Performed by: NURSE PRACTITIONER

## 2025-05-02 PROCEDURE — 3079F DIAST BP 80-89 MM HG: CPT | Mod: CPTII,S$GLB,, | Performed by: NURSE PRACTITIONER

## 2025-05-02 PROCEDURE — 73630 X-RAY EXAM OF FOOT: CPT | Mod: 26,,, | Performed by: RADIOLOGY

## 2025-05-02 PROCEDURE — 73630 X-RAY EXAM OF FOOT: CPT | Mod: TC,50

## 2025-05-02 PROCEDURE — 99214 OFFICE O/P EST MOD 30 MIN: CPT | Mod: S$GLB,,, | Performed by: NURSE PRACTITIONER

## 2025-05-02 PROCEDURE — G2211 COMPLEX E/M VISIT ADD ON: HCPCS | Mod: S$GLB,,, | Performed by: NURSE PRACTITIONER

## 2025-05-02 PROCEDURE — 1159F MED LIST DOCD IN RCRD: CPT | Mod: CPTII,S$GLB,, | Performed by: NURSE PRACTITIONER

## 2025-05-09 ENCOUNTER — RESULTS FOLLOW-UP (OUTPATIENT)
Dept: INTERNAL MEDICINE | Facility: CLINIC | Age: 55
End: 2025-05-09

## 2025-05-23 ENCOUNTER — TELEPHONE (OUTPATIENT)
Dept: INTERNAL MEDICINE | Facility: CLINIC | Age: 55
End: 2025-05-23
Payer: COMMERCIAL

## 2025-05-23 NOTE — TELEPHONE ENCOUNTER
MARLENM for pt to return call to clinic to reschedule pt appointment for 6/18 with MsGiovani Melquiades due to provider not being in clinic.

## 2025-05-28 ENCOUNTER — OFFICE VISIT (OUTPATIENT)
Dept: PODIATRY | Facility: CLINIC | Age: 55
End: 2025-05-28
Payer: COMMERCIAL

## 2025-05-28 VITALS — WEIGHT: 156.5 LBS | BODY MASS INDEX: 26.72 KG/M2 | HEIGHT: 64 IN

## 2025-05-28 DIAGNOSIS — G62.9 PERIPHERAL POLYNEUROPATHY: Primary | ICD-10-CM

## 2025-05-28 PROCEDURE — 3008F BODY MASS INDEX DOCD: CPT | Mod: CPTII,S$GLB,, | Performed by: PODIATRIST

## 2025-05-28 PROCEDURE — 99999 PR PBB SHADOW E&M-EST. PATIENT-LVL III: CPT | Mod: PBBFAC,,, | Performed by: PODIATRIST

## 2025-05-28 PROCEDURE — 1160F RVW MEDS BY RX/DR IN RCRD: CPT | Mod: CPTII,S$GLB,, | Performed by: PODIATRIST

## 2025-05-28 PROCEDURE — 1159F MED LIST DOCD IN RCRD: CPT | Mod: CPTII,S$GLB,, | Performed by: PODIATRIST

## 2025-05-28 PROCEDURE — 99203 OFFICE O/P NEW LOW 30 MIN: CPT | Mod: S$GLB,,, | Performed by: PODIATRIST

## 2025-06-13 ENCOUNTER — LAB VISIT (OUTPATIENT)
Dept: LAB | Facility: HOSPITAL | Age: 55
End: 2025-06-13
Attending: FAMILY MEDICINE
Payer: COMMERCIAL

## 2025-06-13 DIAGNOSIS — Z00.00 PREVENTATIVE HEALTH CARE: ICD-10-CM

## 2025-06-13 DIAGNOSIS — E66.3 OVERWEIGHT (BMI 25.0-29.9): Chronic | ICD-10-CM

## 2025-06-13 DIAGNOSIS — R73.03 PREDIABETES: Chronic | ICD-10-CM

## 2025-06-13 LAB
ALBUMIN SERPL BCP-MCNC: 3.9 G/DL (ref 3.5–5.2)
ALP SERPL-CCNC: 62 UNIT/L (ref 40–150)
ALT SERPL W/O P-5'-P-CCNC: 15 UNIT/L (ref 10–44)
ANION GAP (OHS): 7 MMOL/L (ref 8–16)
AST SERPL-CCNC: 23 UNIT/L (ref 11–45)
BILIRUB SERPL-MCNC: 0.4 MG/DL (ref 0.1–1)
BUN SERPL-MCNC: 12 MG/DL (ref 6–20)
CALCIUM SERPL-MCNC: 9 MG/DL (ref 8.7–10.5)
CHLORIDE SERPL-SCNC: 107 MMOL/L (ref 95–110)
CHOLEST SERPL-MCNC: 210 MG/DL (ref 120–199)
CHOLEST/HDLC SERPL: 2.5 {RATIO} (ref 2–5)
CO2 SERPL-SCNC: 26 MMOL/L (ref 23–29)
CREAT SERPL-MCNC: 1 MG/DL (ref 0.5–1.4)
EAG (OHS): 117 MG/DL (ref 68–131)
GFR SERPLBLD CREATININE-BSD FMLA CKD-EPI: >60 ML/MIN/1.73/M2
GLUCOSE SERPL-MCNC: 90 MG/DL (ref 70–110)
HBA1C MFR BLD: 5.7 % (ref 4–5.6)
HDLC SERPL-MCNC: 85 MG/DL (ref 40–75)
HDLC SERPL: 40.5 % (ref 20–50)
LDLC SERPL CALC-MCNC: 114.2 MG/DL (ref 63–159)
NONHDLC SERPL-MCNC: 125 MG/DL
POTASSIUM SERPL-SCNC: 4.5 MMOL/L (ref 3.5–5.1)
PROT SERPL-MCNC: 7.4 GM/DL (ref 6–8.4)
SODIUM SERPL-SCNC: 140 MMOL/L (ref 136–145)
TRIGL SERPL-MCNC: 54 MG/DL (ref 30–150)

## 2025-06-13 PROCEDURE — 80053 COMPREHEN METABOLIC PANEL: CPT

## 2025-06-13 PROCEDURE — 36415 COLL VENOUS BLD VENIPUNCTURE: CPT

## 2025-06-13 PROCEDURE — 83036 HEMOGLOBIN GLYCOSYLATED A1C: CPT

## 2025-06-13 PROCEDURE — 80061 LIPID PANEL: CPT

## 2025-06-20 ENCOUNTER — OFFICE VISIT (OUTPATIENT)
Dept: PAIN MEDICINE | Facility: CLINIC | Age: 55
End: 2025-06-20
Payer: COMMERCIAL

## 2025-06-20 ENCOUNTER — PATIENT MESSAGE (OUTPATIENT)
Dept: PAIN MEDICINE | Facility: CLINIC | Age: 55
End: 2025-06-20

## 2025-06-20 VITALS — BODY MASS INDEX: 26.87 KG/M2 | HEIGHT: 64 IN

## 2025-06-20 DIAGNOSIS — M47.818 ARTHROPATHY OF RIGHT SACROILIAC JOINT: ICD-10-CM

## 2025-06-20 DIAGNOSIS — M47.816 LUMBAR SPONDYLOSIS: ICD-10-CM

## 2025-06-20 DIAGNOSIS — M54.51 VERTEBROGENIC LOW BACK PAIN: ICD-10-CM

## 2025-06-20 DIAGNOSIS — M54.16 RIGHT LUMBAR RADICULOPATHY: Primary | ICD-10-CM

## 2025-06-20 RX ORDER — PREGABALIN 50 MG/1
50 CAPSULE ORAL 2 TIMES DAILY
Qty: 60 CAPSULE | Refills: 1 | Status: SHIPPED | OUTPATIENT
Start: 2025-06-20

## 2025-06-20 NOTE — PROGRESS NOTES
Established Patient - TeleHealth Visit    The patient location is: [place]  The chief complaint leading to consultation is: chronic pain     Visit type: Audiovisual telemedicine visit     Total time spent on the encounter includes Face-to-face time and non-face to face time preparing to see the patient (eg, review of tests), Obtaining and/or reviewing separately obtained history, Documenting clinical information in the electronic or other health record, Independently interpreting results (not separately reported) and communicating results to the patient/family/caregiver, and/or Care coordination (not separately reported).     Each patient to whom he or she provides medical services by telemedicine is:  (1) informed of the relationship between the physician and patient and the respective role of any other health care provider with respect to management of the patient; and (2) notified that he or she may decline to receive medical services by telemedicine and may withdraw from such care at any time.          Chronic Pain -- Established Patient (Follow-up visit)  Referring Physician: Hector Barker MD    PCP: JEFF Simmons MD      Chief complaint:  Follow-up     C/o low back pain with RLE radicular pain -pain worse at night       SUBJECTIVE:    Interval History (6/20/2025):  Patient presents for follow-up regarding pain management. She reports her pain has been well controlled with her current medication regimen. Pain is managed with meloxicam (Mobic) as needed, particularly before activities such as cleaning. She continues to take Lyrica 50 mg twice daily, which she confirms is helping to control her symptoms. She notes that the effects of a previous epidural injection might be wearing off, as she experiences pain in her right leg, especially when lying down at night. However, she indicates that the pain is not as intense as before. She denies any need for immediate intervention.    Interval History (3/21/2025):   "Liana Keane presents today for follow-up visit.  Patient was last seen on 2/14/2025. At that visit, the plan was to increase Lyrica.  She is taking Lyrica twice per day, which is definitely controlling her pain.  Overall, she is much better than she was at the last visit.  She just has to be careful which she she wears, and if she stands for too long, pain increases.  Patient reports pain as "0/10 today.    Interval History (2/14/2025):  Patient presents today for follow-up visit.  Patient was last seen on 11/14/2024. At that visit, the plan was to continue Lyrica, which helps some. She has been very active lately, so pain has worsened. Patient reports pain as 8/10 today.  She had right L4/5 + L5/S1 TF NORAH 4 months ago with 90% pain relief, and the pain is starting to return.  She continues to take Lyrica at night, and she does not have any obvious next day drowsiness, so seems to be tolerating well.    Interval History (11/14/2024): Liana Keane presents today for follow-up visit.  she underwent right L4/5 + L5/S1 TF NORAH on 10/10/24.  The patient reports that she is/was better following the procedure.  she reports 90% pain relief.  The changes lasted 4 weeks so far.  The changes have continued through this visit.  Patient reports pain as 1/10 today.  Nighttime pain has also improved.  Overall, she is doing much better than she was prior to the injection.    Interval History (9/20/2024):  Patient presents today for follow-up visit to review lumbar MRI. At that visit, the plan was to start Lyrica at night and order formal physical therapy. Patient reports pain as "0/10 in the morning, 10/10 at night when going to bed. Pain is also worse with activity.  She has been taking the Lyrica at night, which is helping some.  She has also been attending physical therapy, which is also helping.    Initial HPI (08/13/2024) - Dr. Song:  Liana Keane is a 53 y.o. female who presents to the clinic for the " evaluation of lower back pain.   Patient reports 2 month history of lower back pain.  Patient denies any inciting traumas to her lower back pain.  Patient denies any previous surgeries on her lumbar spine or major joints.  Pain is described as a throbbing aching pain in the lower back in the right lower extremity.  Patient reports that most painful location is the right anterior thigh that is described as a burning pain.  Patient reports that lower back pain does seem to radiate to her right anterior thigh.  Patient reports occasional radiation to her left lower extremity, but reports that her right-sided pain is her primary pain.  Pain is worse when we up in the morning and lying on her right side, better with rest.  Pain is currently rated a 2/10, but can increase to an 8/10 with exacerbating activity.  Patient denies any fevers, chills, saddle anesthesia, or bowel and bladder incontinence.      Non-Pharmacologic Treatments:  Physical Therapy/Home Exercise: yes  Ice/Heat:yes  TENS: no  Acupuncture: no  Massage: yes  Chiropractic: no        Previous Pain Medications:  Tylenol, meloxicam, ibuprofen, Flexeril, topicals      Pain Procedures:   - 10/10/24: right L4/5 + L5/S1 TF NORAH, 90% pain relief          Pain Disability Index Review:       No data to display                    Imaging/ Diagnostic Studies/ Labs (Reviewed on 6/20/2025):    08/19/2024 MRI Lumbar Spine Without Contrast  COMPARISON: 08/13/2024 radiograph    FINDINGS:  Vertebral body heights maintained.  No spondylolisthesis.  Mild Modic type 1 endplate changes of the L5 inferior endplate.  Mild perifacet edematous changes at the L4-5 and L5-S1 level.  Multilevel early intervertebral disc desiccation changes without significant height loss.    Conus terminates normally.  Visualized intra-abdominal/pelvic structures unremarkable.    L1-L2: No spinal canal stenosis or neural foraminal narrowing.    L2-L3: No spinal canal stenosis or neural foraminal  narrowing.    L3-L4: Minimal circumferential disc bulge without spinal canal stenosis or significant neural foraminal narrowing.  Mild-to-moderate facet arthropathy.    L4-L5: Mild circumferential disc bulging without spinal canal stenosis.  Moderate facet arthropathy contribute to mild bilateral inferior neural foraminal narrowing at L4-5.    L5-S1: Mild circumferential disc bulge without spinal canal stenosis.  Mild-to-moderate facet arthropathy causing minimal bilateral inferior neural foraminal narrowing at L5-S1.  Impression:   Multilevel lower lumbar spine primarily facet degenerative findings without high-grade spinal canal stenosis or neural foraminal narrowing.      08/13/2024 X-Ray Lumbar Complete Including Flex And Ext  COMPARISON: None  FINDINGS:  Seven views of the lumbar spine were obtained.  No evidence of acute fracture or dislocation.  Bony mineralization is normal.  Soft tissues are unremarkable.   Mild spondylosis throughout the visualized thoracolumbar spine.  Mild facet arthropathy lower lumbar spine.  Normal alignment on flexion extension views.  Impression  No acute abnormalities        06/13/24 X-Ray Hip 2 or 3 views Right with Pelvis when performed  FINDINGS:  No pelvic fracture.  No fracture dislocation or degenerative change of the right hip.  No evidence for avascular necrosis of the right femoral head.  Impression  Normal pelvis and right hip x-ray.      7/9/2024 BLE EMG/NCS   ABNORMAL study  2. There is electrodiagnostic evidence of a mild subacute on chronic radiculopathy of the right L5 nerve root                Review of Systems:   Constitutional:  Negative for chills, diaphoresis, fatigue and fever.   HENT:  Negative for ear discharge, ear pain, rhinorrhea, trouble swallowing and voice change.    Respiratory:  Negative for chest tightness, shortness of breath, wheezing and stridor.    Cardiovascular:  Negative for chest pain and leg swelling.   Gastrointestinal:  Negative for blood  "in stool, diarrhea, nausea and vomiting.   Endocrine: Negative for cold intolerance and heat intolerance.   Genitourinary:  Negative for dysuria, hematuria and urgency.   Musculoskeletal:  Positive for arthralgias, back pain, gait problem, joint swelling and myalgias. Negative for neck pain and neck stiffness.   Skin:  Negative for rash.   Neurological:  Positive for weakness and numbness. Negative for tremors, seizures, speech difficulty, light-headedness and headaches.   Hematological:  Does not bruise/bleed easily.   Psychiatric/Behavioral:  Negative for agitation, confusion and suicidal ideas.          OBJECTIVE:    Telemedicine Physical Exam:   Vitals:    06/20/25 0830   Height: 5' 4" (1.626 m)         Body mass index is 26.87 kg/m².   (reviewed on 6/20/2025)     GENERAL: Well appearing, in no acute distress, alert and oriented x3.  Cooperative.  PSYCH:  Mood and affect appropriate.  SKIN: Skin color & texture with no obvious abnormalities.    HEAD/FACE:  Normocephalic, atraumatic.    PULM:  No nasal flaring. No obvious wheezing.  EXTREMITIES: No obvious deformities.   MUSCULOSKELETAL: No obvious atrophy abnormalities are noted.   GAIT: sitting.     Physical Exam: last in clinic visit:  Physical Exam  Constitutional:       General: She is not in acute distress.     Appearance: Normal appearance. She is not ill-appearing.   HENT:      Head: Normocephalic and atraumatic.      Nose: No congestion or rhinorrhea.   Eyes:      Extraocular Movements: Extraocular movements intact.      Pupils: Pupils are equal, round, and reactive to light.   Cardiovascular:      Pulses: Normal pulses.   Pulmonary:      Effort: Pulmonary effort is normal.   Musculoskeletal:      Right hip: Tenderness present. No deformity, lacerations, bony tenderness or crepitus. Decreased range of motion. Decreased strength.      Left hip: Normal.   Skin:     General: Skin is warm and dry.      Capillary Refill: Capillary refill takes less than 2 " seconds.   Neurological:      General: No focal deficit present.      Mental Status: She is alert and oriented to person, place, and time.      Sensory: No sensory deficit.      Motor: Weakness present. No abnormal muscle tone.      Gait: Gait normal.      Deep Tendon Reflexes:      Reflex Scores:       Patellar reflexes are 1+ on the right side and 2+ on the left side.       Achilles reflexes are 1+ on the right side and 2+ on the left side.     Comments: 4/5 strength in right hip adduction in right knee extension   Psychiatric:         Mood and Affect: Mood normal.         Behavior: Behavior normal.         Thought Content: Thought content normal.     Musculoskeletal:  Lumbar Exam  Incision: no  Pain with Flexion: yes  Pain with Extension: yes  ROM:  Decreased  Paraspinous TTP:  Right-greater-than-left  Facet TTP:  L5-S1  Facet Loading:  Negative bilaterally  SLR:  Positive on the right at 80°  SIJ TTP:  Positive on the right  JOHNY:  Negative bilaterally              ASSESSMENT:     54 y.o. year old female with lower back and right lower extremity pain, consistent with right lumbar radiculopathy with possible overlapping hip pathology and sacroiliac joint dysfunction.    1. Right lumbar radiculopathy  pregabalin (LYRICA) 50 MG capsule      2. Arthropathy of right sacroiliac joint        3. Vertebrogenic low back pain        4. Lumbar spondylosis                PLAN:   - Interventions:   - Discussed potential repeat epidural injection if pain becomes more bothersome.  Message provider if symptoms worsen.  She had right L4/5 + L5/S1 TF NORAH with 90% pain relief, and the pain is starting to return.  - S/p right L4/5 + L5/S1 TF NORAH on 10/10/24 with 90% pain relief.    - Anticoagulation use:   No no anticoagulation    - Medications:  - Refill Lyrica (pregabalin) 50mg BID. If too drowsy, can take 100mg QHS. Consider further increase.   - Continue Mobic 15mg QD PRN.    - [place]  reviewed and appropriate.      - Therapy:    - Continue PT-directed at home exercises learned from physical therapy/ ambulation/ stretching to help manage the patient/s painful condition.  She has completed formal physical therapy.    - Imaging/Diagnostic:  - 08/19/2024 MRI Lumbar reviewed: Multilevel lower lumbar spine primarily facet degenerative findings without high-grade spinal canal stenosis or neural foraminal narrowing.  - x-rays of right hip reveal no osseous abnormality  - 7/9/2024 BLE EMG/NCS: mild subacute on chronic right L5 radiculopathy    - Consults:   - Continue follow up with Dr. Barker (Orthopedics) for right hip pain PRN:       - Follow up visit: Follow up in 3 months or sooner if needed - virtual visit       Future Appointments   Date Time Provider Department Center   7/7/2025  1:20 PM Arabella Arnett NP [facility]   9/19/2025  7:40 AM Cheryl Richardson PA-C [facility]       - Patient Questions: Answered all of the patient's questions regarding diagnosis, therapy, and treatment.    - This condition does not require this patient to take time off of work, and the primary goal of our Pain Management services is to improve the patient's functional capacity.   - I discussed the risks, benefits, and alternatives to potential treatment options. All questions and concerns were fully addressed today in clinic.         Cheryl Richardson PA-C  Interventional Pain Management - [facility]    Disclaimer:  This note was prepared using voice recognition system and is likely to have sound alike errors that may have been overlooked even after proof reading.  Please call me with any questions.    This note was generated with the assistance of ambient listening technology. Recording of patient appointment was utlized to facilitate this note. I attest to having reviewed and edited the generated note for accuracy, though some syntax or spelling errors may persist. Please contact the author of this note for any clarification.

## 2025-06-25 ENCOUNTER — TELEPHONE (OUTPATIENT)
Dept: INTERNAL MEDICINE | Facility: CLINIC | Age: 55
End: 2025-06-25
Payer: COMMERCIAL

## 2025-06-25 NOTE — TELEPHONE ENCOUNTER
Spoke with pt about NP Arnett being out the month of July  and needing to reschedule her with MALIHA Clark, pt reviewed her calendar and gave a day that was available, she is scheduled

## 2025-07-16 ENCOUNTER — OFFICE VISIT (OUTPATIENT)
Dept: INTERNAL MEDICINE | Facility: CLINIC | Age: 55
End: 2025-07-16
Payer: COMMERCIAL

## 2025-07-16 VITALS
DIASTOLIC BLOOD PRESSURE: 80 MMHG | WEIGHT: 150.56 LBS | HEIGHT: 64 IN | TEMPERATURE: 99 F | BODY MASS INDEX: 25.7 KG/M2 | SYSTOLIC BLOOD PRESSURE: 130 MMHG

## 2025-07-16 DIAGNOSIS — F41.9 ANXIETY: Chronic | ICD-10-CM

## 2025-07-16 DIAGNOSIS — E78.2 MODERATE MIXED HYPERLIPIDEMIA NOT REQUIRING STATIN THERAPY: Chronic | ICD-10-CM

## 2025-07-16 DIAGNOSIS — M54.16 LUMBAR RADICULOPATHY: ICD-10-CM

## 2025-07-16 DIAGNOSIS — E04.2 MULTIPLE THYROID NODULES: Chronic | ICD-10-CM

## 2025-07-16 DIAGNOSIS — E66.3 OVERWEIGHT (BMI 25.0-29.9): Chronic | ICD-10-CM

## 2025-07-16 DIAGNOSIS — R73.03 PREDIABETES: Primary | Chronic | ICD-10-CM

## 2025-07-16 DIAGNOSIS — F51.04 PSYCHOPHYSIOLOGIC INSOMNIA: Chronic | ICD-10-CM

## 2025-07-16 DIAGNOSIS — J30.1 SEASONAL ALLERGIC RHINITIS DUE TO POLLEN: Chronic | ICD-10-CM

## 2025-07-16 PROCEDURE — 3079F DIAST BP 80-89 MM HG: CPT | Mod: CPTII,S$GLB,, | Performed by: NURSE PRACTITIONER

## 2025-07-16 PROCEDURE — 99396 PREV VISIT EST AGE 40-64: CPT | Mod: S$GLB,,, | Performed by: NURSE PRACTITIONER

## 2025-07-16 PROCEDURE — 99999 PR PBB SHADOW E&M-EST. PATIENT-LVL III: CPT | Mod: PBBFAC,,, | Performed by: NURSE PRACTITIONER

## 2025-07-16 PROCEDURE — 3008F BODY MASS INDEX DOCD: CPT | Mod: CPTII,S$GLB,, | Performed by: NURSE PRACTITIONER

## 2025-07-16 PROCEDURE — 1159F MED LIST DOCD IN RCRD: CPT | Mod: CPTII,S$GLB,, | Performed by: NURSE PRACTITIONER

## 2025-07-16 PROCEDURE — 3075F SYST BP GE 130 - 139MM HG: CPT | Mod: CPTII,S$GLB,, | Performed by: NURSE PRACTITIONER

## 2025-07-16 PROCEDURE — 3044F HG A1C LEVEL LT 7.0%: CPT | Mod: CPTII,S$GLB,, | Performed by: NURSE PRACTITIONER

## 2025-07-16 RX ORDER — ZOLPIDEM TARTRATE 5 MG/1
5 TABLET ORAL NIGHTLY PRN
Qty: 90 TABLET | Refills: 1 | Status: SHIPPED | OUTPATIENT
Start: 2025-07-16

## 2025-07-16 NOTE — PROGRESS NOTES
Patient ID: Liana Keane is a 54 y.o. female.    Chief Complaint: Annual Exam    History of Present Illness    CHIEF COMPLAINT:  Patient presents today for a checkup.    PREDIABETES:  Her A1C has been consistently between 5.7-5.8 over the past year, indicating prediabetes. She acknowledges not strictly following a diabetic diet and has a family history of diabetes. She expresses desire to manage condition through lifestyle modifications rather than medication and recognizes lack of exercise as a contributing factor to current health status.    HYPERLIPIDEMIA:  Her total cholesterol is 210, which is an improvement from a previous level of 245.    THYROID DISORDER:  She has a history of partial thyroidectomy with multiple thyroid nodules. She experiences persistent throat clearing and occasional difficulty swallowing, describing a sensation that food does not completely pass down her throat. Her last thyroid ultrasound was in 2023. She denies choking on food but reports intermittent swallowing discomfort. She is uncertain whether symptoms are related to her thyroid, esophagus, or potentially a nervous condition.    SLEEP:  She takes Ambien for sleep and experiences significant sleep disturbances without medication, describing tossing and turning when attempting to sleep without it. Her sleep issues have worsened since estiven COVID-19. She attributes potential sleep disruption to work and school-related stress.    MEDICATIONS:  She currently takes Lyrica/pregabalin for back pain and reports being in desperate need of this medication. She no longer takes medication for anxiety, reporting being okay without it. She takes Ambien for sleep.      ROS:  General: -fever, -chills, -fatigue, -weight gain, -weight loss  Eyes: -vision changes, -redness, -discharge  ENT: -ear pain, -nasal congestion, -sore throat  Cardiovascular: -chest pain, -palpitations, -lower extremity edema  Respiratory: -cough, -shortness of  breath  Gastrointestinal: -abdominal pain, -nausea, -vomiting, -diarrhea, -constipation, -blood in stool, +difficulty swallowing  Genitourinary: -dysuria, -hematuria, -frequency  Musculoskeletal: -joint pain, -muscle pain  Skin: -rash, -lesion  Neurological: -headache, -dizziness, -numbness, -tingling, +difficulty staying asleep, +sleep disturbances, +difficulty falling asleep  Psychiatric: -anxiety, -depression, +sleep difficulty         Physical Exam    General: No acute distress. Well-developed. Well-nourished.  Eyes: EOMI. Sclerae anicteric.  HENT: Normocephalic. Atraumatic. Nares patent. Moist oral mucosa.  Ears: Bilateral TMs clear. Bilateral EACs clear.  Cardiovascular: Regular rate. Regular rhythm. No murmurs. No rubs. No gallops. Normal S1, S2.  Respiratory: Normal respiratory effort. Clear to auscultation bilaterally. No rales. No rhonchi. No wheezing.  Abdomen: Soft. Non-tender. Non-distended. Normoactive bowel sounds.  Musculoskeletal: No  obvious deformity.  Extremities: No lower extremity edema.  Neurological: Alert & oriented x3. No slurred speech. Normal gait.  Psychiatric: Normal mood. Normal affect. Good insight. Good judgment.  Skin: Warm. Dry. No rash.         Assessment & Plan        PREDIABETES:  - Patient's A1C level have consistently remained in the pre-diabetic range (5.7-5.8) over the past year, placing them at the threshold of prediabetes.  - Explained prediabetes as a precursor to diabetes and its relation to family history.  - Discussed the impact of carbohydrates and sugars on glucose levels.  - Recommend lifestyle management approach before considering medication, including: reducing intake of simple carbohydrates (rice, bread, pasta, potatoes, sweets) while favoring complex carbohydrates (brown rice, quinoa, whole grains); implementing portion control with small, frequent meals; increasing physical activity to 10,000 steps daily (approximately 500 calories burned); and considering  "healthier alternatives for sweets such as fruit parfaits with peanut butter and honey.    HYPERLIPIDEMIA:  - Monitored cholesterol levels, which improved from previous 245 to recent 210.  - Overall cholesterol ratio is 2.5, within normal limits (2-5) due to high HDL levels.  - Explained the protective effect of high HDL cholesterol.  - Recommend continued lifestyle management approach before considering medication, including eliminating fatty foods and increasing exercise to further improve cholesterol levels.    MULTINODULAR GOITER:  - Patient has history of multiple thyroid nodules and partial thyroid removal.  - Thyroid nodules have not been checked since 2023.  - Ordered thyroid ultrasound to check nodules and assess for potential obstructive issues, as per annual recommendation.      INSOMNIA:  - Patient reports sleep is "horrible" and has trouble sleeping without Ambien.  - Prescribed refill of Ambien for sleep issues.  - Note that sleep problems began after COVID infection.    BACK PAIN:  - Patient is taking pregabalin (Lyrica) for back pain and is currently under care at a pain management clinic.      FAMILY HISTORY OF DIABETES:  - Noted family history of diabetes, which increases patient's risk given their prediabetic status.  - This risk factor was discussed with patient in relation to their prediabetes management.    FOLLOW-UP:  - Follow up in 6 months.            This note was generated with the assistance of ambient listening technology. Verbal consent was obtained by the patient and accompanying visitor(s) for the recording of patient appointment to facilitate this note. I attest to having reviewed and edited the generated note for accuracy, though some syntax or spelling errors may persist. Please contact the author of this note for any clarification.    "

## 2025-07-23 ENCOUNTER — HOSPITAL ENCOUNTER (OUTPATIENT)
Dept: RADIOLOGY | Facility: HOSPITAL | Age: 55
Discharge: HOME | End: 2025-07-23
Attending: NURSE PRACTITIONER
Payer: COMMERCIAL

## 2025-07-23 DIAGNOSIS — E04.2 MULTIPLE THYROID NODULES: Chronic | ICD-10-CM

## 2025-07-23 PROCEDURE — 76536 US EXAM OF HEAD AND NECK: CPT | Mod: 26,,, | Performed by: RADIOLOGY

## 2025-07-23 PROCEDURE — 76536 US EXAM OF HEAD AND NECK: CPT | Mod: TC

## 2025-07-24 DIAGNOSIS — E04.1 THYROID NODULE: Primary | ICD-10-CM

## 2025-08-07 ENCOUNTER — TELEPHONE (OUTPATIENT)
Dept: OTOLARYNGOLOGY | Facility: CLINIC | Age: 55
End: 2025-08-07
Payer: COMMERCIAL

## 2025-08-07 ENCOUNTER — OFFICE VISIT (OUTPATIENT)
Dept: OTOLARYNGOLOGY | Facility: CLINIC | Age: 55
End: 2025-08-07
Payer: COMMERCIAL

## 2025-08-07 VITALS — BODY MASS INDEX: 26.64 KG/M2 | WEIGHT: 156.06 LBS | HEIGHT: 64 IN

## 2025-08-07 DIAGNOSIS — R13.10 DYSPHAGIA, UNSPECIFIED TYPE: Primary | ICD-10-CM

## 2025-08-07 DIAGNOSIS — E04.1 THYROID NODULE: ICD-10-CM

## 2025-08-07 PROCEDURE — 3008F BODY MASS INDEX DOCD: CPT | Mod: CPTII,S$GLB,, | Performed by: ORTHOPAEDIC SURGERY

## 2025-08-07 PROCEDURE — 1159F MED LIST DOCD IN RCRD: CPT | Mod: CPTII,S$GLB,, | Performed by: ORTHOPAEDIC SURGERY

## 2025-08-07 PROCEDURE — 3044F HG A1C LEVEL LT 7.0%: CPT | Mod: CPTII,S$GLB,, | Performed by: ORTHOPAEDIC SURGERY

## 2025-08-07 PROCEDURE — 99204 OFFICE O/P NEW MOD 45 MIN: CPT | Mod: 25,S$GLB,, | Performed by: ORTHOPAEDIC SURGERY

## 2025-08-07 PROCEDURE — 31575 DIAGNOSTIC LARYNGOSCOPY: CPT | Mod: S$GLB,,, | Performed by: ORTHOPAEDIC SURGERY

## 2025-08-07 PROCEDURE — 99999 PR PBB SHADOW E&M-EST. PATIENT-LVL III: CPT | Mod: PBBFAC,,, | Performed by: ORTHOPAEDIC SURGERY

## 2025-08-07 NOTE — TELEPHONE ENCOUNTER
----- Message from Lindsey Welch MD sent at 8/7/2025  3:12 PM CDT -----  Please schedule 1 year RTC with thyroid US prior

## 2025-08-07 NOTE — PROGRESS NOTES
Answers submitted by the patient for this visit:  Review of Symptoms Questionnaire  (Submitted on 8/6/2025)  Fatigue (Tiredness)?: Yes  None of these: Yes  None of these : Yes  None of these: Yes  None of these : Yes  None of these: Yes  None of these: Yes  None of these: Yes  None of these : Yes  Seasonal Allergies?: Yes  None of these : Yes  None of these: Yes  None of these: Yes  None of these: Yes

## 2025-08-08 ENCOUNTER — E-CONSULT (OUTPATIENT)
Dept: ENDOSCOPY | Facility: HOSPITAL | Age: 55
End: 2025-08-08
Payer: COMMERCIAL

## 2025-08-08 ENCOUNTER — PATIENT MESSAGE (OUTPATIENT)
Dept: OTOLARYNGOLOGY | Facility: CLINIC | Age: 55
End: 2025-08-08
Payer: COMMERCIAL

## 2025-08-08 DIAGNOSIS — R13.10 DYSPHAGIA, UNSPECIFIED TYPE: Primary | ICD-10-CM

## 2025-08-08 NOTE — CONSULTS
[facility] 1st Fl  Response for E-Consult     Patient Name: Liana Keane  MRN: [id number]  Primary Care Provider: JEFF Simmons MD   Requesting Provider: Lindsey Welch MD  E-Consult to Gastroenterology  Consult performed by: Austin Bryson MD  Consult ordered by: Lindsey Welch MD  Reason for consult: Dysphagia        History of thyroid nodule s/p partial thyroidectomy.   Difficulty swallowing with solids.   Denies GERD symptoms.   Laryngoscopy negative.     Recommendation:     -Will order EGD with biopsies at this time.   -Further recommendations after path results. May need an esophageal manometry if biopsies are negative.     Additional future steps to consider: See above     Total time of Consultation: 10 minute    I did not speak to the requesting provider verbally about this.     *This eConsult is based on the clinical data available to me and is furnished without benefit of a physical examination. The eConsult will need to be interpreted in light of any clinical issues or changes in patient status not available to me at the time of filing this eConsults. Significant changes in patient condition or level of acuity should result in immediate formal consultation and reevaluation. Please alert me if you have further questions.    Thank you for this eConsult referral.     Austin Bryson MD  [facility]

## 2025-08-11 ENCOUNTER — HOSPITAL ENCOUNTER (OUTPATIENT)
Dept: PREADMISSION TESTING | Facility: HOSPITAL | Age: 55
Discharge: HOME | End: 2025-08-11
Attending: INTERNAL MEDICINE
Payer: COMMERCIAL

## 2025-08-11 DIAGNOSIS — R13.10 DYSPHAGIA, UNSPECIFIED TYPE: Primary | ICD-10-CM

## 2025-08-22 ENCOUNTER — ANESTHESIA EVENT (OUTPATIENT)
Dept: ENDOSCOPY | Facility: HOSPITAL | Age: 55
End: 2025-08-22
Payer: COMMERCIAL

## 2025-08-27 ENCOUNTER — ANESTHESIA (OUTPATIENT)
Dept: ENDOSCOPY | Facility: HOSPITAL | Age: 55
End: 2025-08-27
Payer: COMMERCIAL

## 2025-08-27 ENCOUNTER — HOSPITAL ENCOUNTER (OUTPATIENT)
Dept: ENDOSCOPY | Facility: HOSPITAL | Age: 55
Discharge: HOME | End: 2025-08-27
Attending: INTERNAL MEDICINE
Payer: COMMERCIAL

## 2025-08-27 VITALS
OXYGEN SATURATION: 100 % | TEMPERATURE: 97 F | WEIGHT: 153.69 LBS | DIASTOLIC BLOOD PRESSURE: 77 MMHG | SYSTOLIC BLOOD PRESSURE: 116 MMHG | HEART RATE: 65 BPM | BODY MASS INDEX: 26.24 KG/M2 | RESPIRATION RATE: 16 BRPM | HEIGHT: 64 IN

## 2025-08-27 DIAGNOSIS — R13.10 DYSPHAGIA, UNSPECIFIED TYPE: ICD-10-CM

## 2025-08-27 PROCEDURE — 63600175 PHARM REV CODE 636 W HCPCS: Performed by: INTERNAL MEDICINE

## 2025-08-27 PROCEDURE — 27201012 HC FORCEPS, HOT/COLD, DISP: Performed by: INTERNAL MEDICINE

## 2025-08-27 PROCEDURE — 43239 EGD BIOPSY SINGLE/MULTIPLE: CPT | Mod: ,,, | Performed by: INTERNAL MEDICINE

## 2025-08-27 PROCEDURE — 88305 TISSUE EXAM BY PATHOLOGIST: CPT | Mod: TC | Performed by: INTERNAL MEDICINE

## 2025-08-27 PROCEDURE — 63600175 PHARM REV CODE 636 W HCPCS: Performed by: NURSE ANESTHETIST, CERTIFIED REGISTERED

## 2025-08-27 PROCEDURE — 37000008 HC ANESTHESIA 1ST 15 MINUTES: Performed by: INTERNAL MEDICINE

## 2025-08-27 PROCEDURE — 43239 EGD BIOPSY SINGLE/MULTIPLE: CPT | Performed by: INTERNAL MEDICINE

## 2025-08-27 RX ORDER — PROPOFOL 10 MG/ML
VIAL (ML) INTRAVENOUS
Status: DISCONTINUED | OUTPATIENT
Start: 2025-08-27 | End: 2025-08-27

## 2025-08-27 RX ORDER — SODIUM CHLORIDE, SODIUM LACTATE, POTASSIUM CHLORIDE, CALCIUM CHLORIDE 600; 310; 30; 20 MG/100ML; MG/100ML; MG/100ML; MG/100ML
INJECTION, SOLUTION INTRAVENOUS CONTINUOUS
Status: DISCONTINUED | OUTPATIENT
Start: 2025-08-27 | End: 2025-08-28 | Stop reason: HOSPADM

## 2025-08-27 RX ORDER — LIDOCAINE HYDROCHLORIDE 20 MG/ML
INJECTION INTRAVENOUS
Status: DISCONTINUED | OUTPATIENT
Start: 2025-08-27 | End: 2025-08-27

## 2025-08-27 RX ADMIN — SODIUM CHLORIDE, POTASSIUM CHLORIDE, SODIUM LACTATE AND CALCIUM CHLORIDE: 600; 310; 30; 20 INJECTION, SOLUTION INTRAVENOUS at 09:08

## 2025-08-27 RX ADMIN — PROPOFOL 100 MG: 10 INJECTION, EMULSION INTRAVENOUS at 09:08

## 2025-08-27 RX ADMIN — PROPOFOL 60 MG: 10 INJECTION, EMULSION INTRAVENOUS at 09:08

## 2025-08-27 RX ADMIN — LIDOCAINE HYDROCHLORIDE 60 MG: 20 INJECTION INTRAVENOUS at 09:08

## 2025-08-28 LAB
ESTROGEN SERPL-MCNC: NORMAL PG/ML
INSULIN SERPL-ACNC: NORMAL U[IU]/ML
LAB AP CLINICAL INFORMATION: NORMAL
LAB AP GROSS DESCRIPTION: NORMAL
LAB AP PERFORMING LOCATION(S): NORMAL
LAB AP REPORT FOOTNOTES: NORMAL

## (undated) DEVICE — DEVICE ABLATION NOVASURE DISP

## (undated) DEVICE — SEE L#152161

## (undated) DEVICE — SEE MEDLINE ITEM 157027

## (undated) DEVICE — SEE MEDLINE ITEM 152739

## (undated) DEVICE — SEE MEDLINE ITEM 152622

## (undated) DEVICE — CONTAINER SPECIMEN STRL 4OZ

## (undated) DEVICE — SEE MEDLINE ITEM 154981

## (undated) DEVICE — DRESSING TELFA N ADH 3X8

## (undated) DEVICE — SEE MEDLINE ITEM 157117

## (undated) DEVICE — MANIFOLD 4 PORT

## (undated) DEVICE — CATH URETHRAL 18FR

## (undated) DEVICE — TUBE SET AQUILEX FLUID CONTROL

## (undated) DEVICE — DEVICE MYOSURE REACH

## (undated) DEVICE — SEE MEDLINE ITEM 157181

## (undated) DEVICE — PACK DRAPE PERI/GYN TIBURON

## (undated) DEVICE — SOL IRR GLY D RX 1.5%

## (undated) DEVICE — ELECTRODE REM PLYHSV RETURN 9

## (undated) DEVICE — GLOVE 8 PROTEXIS PI BLUE

## (undated) DEVICE — GLOVE PROTEXIS HYDROGEL SZ7.5

## (undated) DEVICE — SET CYSTO IRRIGATION UNIV SPIK